# Patient Record
Sex: MALE | Race: ASIAN | NOT HISPANIC OR LATINO
[De-identification: names, ages, dates, MRNs, and addresses within clinical notes are randomized per-mention and may not be internally consistent; named-entity substitution may affect disease eponyms.]

---

## 2020-11-02 ENCOUNTER — APPOINTMENT (OUTPATIENT)
Dept: UROLOGY | Facility: CLINIC | Age: 80
End: 2020-11-02
Payer: MEDICARE

## 2020-11-02 VITALS — HEART RATE: 79 BPM | DIASTOLIC BLOOD PRESSURE: 76 MMHG | SYSTOLIC BLOOD PRESSURE: 128 MMHG

## 2020-11-02 VITALS — TEMPERATURE: 97 F

## 2020-11-02 DIAGNOSIS — F17.210 NICOTINE DEPENDENCE, CIGARETTES, UNCOMPLICATED: ICD-10-CM

## 2020-11-02 DIAGNOSIS — Z00.00 ENCOUNTER FOR GENERAL ADULT MEDICAL EXAMINATION W/OUT ABNORMAL FINDINGS: ICD-10-CM

## 2020-11-02 DIAGNOSIS — R35.1 NOCTURIA: ICD-10-CM

## 2020-11-02 PROCEDURE — 99204 OFFICE O/P NEW MOD 45 MIN: CPT

## 2020-11-02 PROCEDURE — 99072 ADDL SUPL MATRL&STAF TM PHE: CPT

## 2020-11-02 NOTE — ADDENDUM
[FreeTextEntry1] : Entered by Kristian Arndt, acting as scribe for Dr. Ted Husain.\par \par The documentation recorded by the scribe accurately reflects the service I personally performed and the decisions made by me.\par

## 2020-11-02 NOTE — LETTER BODY
[FreeTextEntry1] : Germaine HNathalia Soliz, DO\par [address]\[place] [address]\par [phone]\par \par Dear Dr. Soliz,\par \par REASON FOR VISIT: BPH.\par \par This is a 80 year-old Cantonese-speaking retired gentleman with lower urinary tract symptoms and BPH. Patient is here today for evaluation. Patient reports he has strong uroflow, but persistent urinary frequency, and urgency despite medical therapy. He denies any hematuria or urinary incontinence. His symptoms are aggravated by hydration. He denies any alleviating factors. The patient also reports nocturia more than twice per night. He is currently taking Flomax without improvement. He denies any pain. All other review of systems are negative. He has no cancer in his family medical history. He has no previous surgical history. Past medical history, family history and social history were inquired and were noncontributory to current condition. Patient currently smokes. I encourage the patient to stop smoking and seek smoking cessation programs. I discuss with him to potential long term complications and health effects from smoking. I gave the patient additional information including the [facility] Refer-to-Quit program. I spent over 3 minutes counseling the patient regarding smoking cessation. The patient does not drink alcohol. Medications and allergies were reviewed. He has no known allergies to medication. \par \par On examination, the patient is a healthy-appearing gentleman in no acute distress. He is alert and oriented follows commands. He has normal mood and affect. He is normocephalic. Neck is supple. Oral no thrush. Respirations are unlabored. His abdomen is soft and nontender. Bladder is nonpalpable. No CVA tenderness. Neurologically he is grossly intact. No peripheral edema. Skin without gross abnormality. He has normal male external genitalia. Normal meatus. Bilateral testes are descended intrascrotally and normal to palpation. On rectal examination, there is normal sphincter tone. His prostate is markedly enlarged.\par \par PVR was 70  cc.\par \par ASSESSMENT: BPH.\par \par I counseled the patient on the various etiology of his symptoms. I discussed the natural history of BPH and the treatment options available. I discussed the options of conservative management with fluid in dietary restrictions, herbal therapy, medical therapy, and minimally invasive procedures.  Risk and benefits were discussed. I answered his questions. I recommended he increase Flomax to BID. I also recommended the patient begin a trial of Proscar. I discussed the potential side effects of the medication. I counseled the patient on its use and side effects. If the patient develops any side effects, the patient will discontinue the medication and contact me. He may also consider cystoscopy to evaluate the urinary outlet. He will obtain PSA and BMP today to establish baselines. Risks and alternatives were discussed. I answered the patient questions. The patient will follow-up as directed and will contact me with any questions or concerns. Thank you for the opportunity to participate in the care of Mr. RO. I will keep you updated on his progress.\par \par Plan: Increase Flomax to BID. Trial of Proscar. PSA. BMP.  Follow-up as directed.

## 2020-11-04 LAB
ANION GAP SERPL CALC-SCNC: 11 MMOL/L
APPEARANCE: CLEAR
BACTERIA: NEGATIVE
BILIRUBIN URINE: NEGATIVE
BLOOD URINE: NEGATIVE
BUN SERPL-MCNC: 17 MG/DL
CALCIUM SERPL-MCNC: 9.7 MG/DL
CHLORIDE SERPL-SCNC: 104 MMOL/L
CO2 SERPL-SCNC: 25 MMOL/L
COLOR: NORMAL
CREAT SERPL-MCNC: 0.88 MG/DL
GLUCOSE QUALITATIVE U: NEGATIVE
GLUCOSE SERPL-MCNC: 92 MG/DL
HYALINE CASTS: 0 /LPF
KETONES URINE: NEGATIVE
LEUKOCYTE ESTERASE URINE: NEGATIVE
MICROSCOPIC-UA: NORMAL
NITRITE URINE: NEGATIVE
PH URINE: 6
POTASSIUM SERPL-SCNC: 4.7 MMOL/L
PROTEIN URINE: NEGATIVE
PSA FREE FLD-MCNC: 28 %
PSA FREE SERPL-MCNC: 1.72 NG/ML
PSA SERPL-MCNC: 6.08 NG/ML
RED BLOOD CELLS URINE: 1 /HPF
SODIUM SERPL-SCNC: 140 MMOL/L
SPECIFIC GRAVITY URINE: 1.02
SQUAMOUS EPITHELIAL CELLS: 0 /HPF
UROBILINOGEN URINE: NORMAL
WHITE BLOOD CELLS URINE: 0 /HPF

## 2021-09-20 ENCOUNTER — EMERGENCY (EMERGENCY)
Facility: HOSPITAL | Age: 81
LOS: 0 days | Discharge: HOME | End: 2021-09-20
Attending: HOSPITALIST
Payer: MEDICARE

## 2021-09-20 VITALS
HEIGHT: 63 IN | SYSTOLIC BLOOD PRESSURE: 102 MMHG | DIASTOLIC BLOOD PRESSURE: 51 MMHG | TEMPERATURE: 98 F | RESPIRATION RATE: 18 BRPM | WEIGHT: 130.07 LBS | HEART RATE: 119 BPM | OXYGEN SATURATION: 100 %

## 2021-09-20 VITALS — SYSTOLIC BLOOD PRESSURE: 99 MMHG | DIASTOLIC BLOOD PRESSURE: 45 MMHG

## 2021-09-20 DIAGNOSIS — R42 DIZZINESS AND GIDDINESS: ICD-10-CM

## 2021-09-20 DIAGNOSIS — R03.1 NONSPECIFIC LOW BLOOD-PRESSURE READING: ICD-10-CM

## 2021-09-20 LAB
ALBUMIN SERPL ELPH-MCNC: 3.8 G/DL — SIGNIFICANT CHANGE UP (ref 3.3–5)
ALP SERPL-CCNC: 90 U/L — SIGNIFICANT CHANGE UP (ref 40–120)
ALT FLD-CCNC: 22 U/L — SIGNIFICANT CHANGE UP (ref 12–78)
ANION GAP SERPL CALC-SCNC: 6 MMOL/L — SIGNIFICANT CHANGE UP (ref 5–17)
AST SERPL-CCNC: 11 U/L — LOW (ref 15–37)
BASOPHILS # BLD AUTO: 0.12 K/UL — SIGNIFICANT CHANGE UP (ref 0–0.2)
BASOPHILS NFR BLD AUTO: 1.2 % — SIGNIFICANT CHANGE UP (ref 0–2)
BILIRUB SERPL-MCNC: 0.4 MG/DL — SIGNIFICANT CHANGE UP (ref 0.2–1.2)
BUN SERPL-MCNC: 16 MG/DL — SIGNIFICANT CHANGE UP (ref 7–23)
CALCIUM SERPL-MCNC: 9.1 MG/DL — SIGNIFICANT CHANGE UP (ref 8.5–10.1)
CHLORIDE SERPL-SCNC: 104 MMOL/L — SIGNIFICANT CHANGE UP (ref 96–108)
CO2 SERPL-SCNC: 27 MMOL/L — SIGNIFICANT CHANGE UP (ref 22–31)
CREAT SERPL-MCNC: 1.07 MG/DL — SIGNIFICANT CHANGE UP (ref 0.5–1.3)
EOSINOPHIL # BLD AUTO: 0.68 K/UL — HIGH (ref 0–0.5)
EOSINOPHIL NFR BLD AUTO: 6.8 % — HIGH (ref 0–6)
GLUCOSE SERPL-MCNC: 113 MG/DL — HIGH (ref 70–99)
HCT VFR BLD CALC: 39.8 % — SIGNIFICANT CHANGE UP (ref 39–50)
HGB BLD-MCNC: 13.1 G/DL — SIGNIFICANT CHANGE UP (ref 13–17)
IMM GRANULOCYTES NFR BLD AUTO: 0.6 % — SIGNIFICANT CHANGE UP (ref 0–1.5)
LYMPHOCYTES # BLD AUTO: 2.15 K/UL — SIGNIFICANT CHANGE UP (ref 1–3.3)
LYMPHOCYTES # BLD AUTO: 21.4 % — SIGNIFICANT CHANGE UP (ref 13–44)
MCHC RBC-ENTMCNC: 30.2 PG — SIGNIFICANT CHANGE UP (ref 27–34)
MCHC RBC-ENTMCNC: 32.9 GM/DL — SIGNIFICANT CHANGE UP (ref 32–36)
MCV RBC AUTO: 91.7 FL — SIGNIFICANT CHANGE UP (ref 80–100)
MONOCYTES # BLD AUTO: 0.85 K/UL — SIGNIFICANT CHANGE UP (ref 0–0.9)
MONOCYTES NFR BLD AUTO: 8.4 % — SIGNIFICANT CHANGE UP (ref 2–14)
NEUTROPHILS # BLD AUTO: 6.21 K/UL — SIGNIFICANT CHANGE UP (ref 1.8–7.4)
NEUTROPHILS NFR BLD AUTO: 61.6 % — SIGNIFICANT CHANGE UP (ref 43–77)
PLATELET # BLD AUTO: 216 K/UL — SIGNIFICANT CHANGE UP (ref 150–400)
POTASSIUM SERPL-MCNC: 4 MMOL/L — SIGNIFICANT CHANGE UP (ref 3.5–5.3)
POTASSIUM SERPL-SCNC: 4 MMOL/L — SIGNIFICANT CHANGE UP (ref 3.5–5.3)
PROT SERPL-MCNC: 7.6 GM/DL — SIGNIFICANT CHANGE UP (ref 6–8.3)
RBC # BLD: 4.34 M/UL — SIGNIFICANT CHANGE UP (ref 4.2–5.8)
RBC # FLD: 13.1 % — SIGNIFICANT CHANGE UP (ref 10.3–14.5)
SODIUM SERPL-SCNC: 137 MMOL/L — SIGNIFICANT CHANGE UP (ref 135–145)
TROPONIN I SERPL-MCNC: <0.015 NG/ML — SIGNIFICANT CHANGE UP (ref 0.01–0.04)
WBC # BLD: 10.07 K/UL — SIGNIFICANT CHANGE UP (ref 3.8–10.5)
WBC # FLD AUTO: 10.07 K/UL — SIGNIFICANT CHANGE UP (ref 3.8–10.5)

## 2021-09-20 PROCEDURE — 93005 ELECTROCARDIOGRAM TRACING: CPT

## 2021-09-20 PROCEDURE — 85025 COMPLETE CBC W/AUTO DIFF WBC: CPT

## 2021-09-20 PROCEDURE — 36415 COLL VENOUS BLD VENIPUNCTURE: CPT

## 2021-09-20 PROCEDURE — 71045 X-RAY EXAM CHEST 1 VIEW: CPT | Mod: 26

## 2021-09-20 PROCEDURE — 99285 EMERGENCY DEPT VISIT HI MDM: CPT

## 2021-09-20 PROCEDURE — 80053 COMPREHEN METABOLIC PANEL: CPT

## 2021-09-20 PROCEDURE — 93010 ELECTROCARDIOGRAM REPORT: CPT

## 2021-09-20 PROCEDURE — 71045 X-RAY EXAM CHEST 1 VIEW: CPT

## 2021-09-20 PROCEDURE — 99284 EMERGENCY DEPT VISIT MOD MDM: CPT | Mod: 25

## 2021-09-20 PROCEDURE — 84484 ASSAY OF TROPONIN QUANT: CPT

## 2021-09-20 RX ORDER — SODIUM CHLORIDE 9 MG/ML
1000 INJECTION INTRAMUSCULAR; INTRAVENOUS; SUBCUTANEOUS ONCE
Refills: 0 | Status: COMPLETED | OUTPATIENT
Start: 2021-09-20 | End: 2021-09-20

## 2021-09-20 RX ADMIN — SODIUM CHLORIDE 1000 MILLILITER(S): 9 INJECTION INTRAMUSCULAR; INTRAVENOUS; SUBCUTANEOUS at 04:41

## 2021-09-20 NOTE — ED PROVIDER NOTE - CLINICAL SUMMARY MEDICAL DECISION MAKING FREE TEXT BOX
81M with episodic hypotension and associated dizziness. feeling better. labs, fluids, ekg, orthostatic vs.

## 2021-09-20 NOTE — ED ADULT NURSE NOTE - OBJECTIVE STATEMENT
Per patient, he was sleeping and woke up and urgently had to urinate. Got up quickly and walked fast to bathroom, patient states he became very dizzy, no LOC denies falling, patient sat down and symptoms resolved. Per EMS, on their arrival patient was hypotensive 50/20 and that states they argued with patient x1 hr about bringing him to ED. At triage, patient in do distress, denies any complaints, upset with EMS for bringing him to ED, tachycardia noted but all other VS stable.

## 2021-09-20 NOTE — ED PROVIDER NOTE - OBJECTIVE STATEMENT
81M p/w episode of low BP and dizziness pta. patient woke up at night and quickly got up to use the bathroom, felt dizzy and sat down. family called EMS. BP noted to be low upon arrival but then improved. patient felt much better. no loc or syncope. no cp. otherwise has been feeling well.

## 2021-09-20 NOTE — ED PROVIDER NOTE - NSFOLLOWUPINSTRUCTIONS_ED_ALL_ED_FT
please follow up with your doctor and stay well hydrated with water  return for any recurrent or worsening symptoms.

## 2021-09-20 NOTE — ED PROVIDER NOTE - PROGRESS NOTE DETAILS
Catarina APPLE: spoke with patient at length, offered admission but patient feeling well and prefers to follow up with his PMD this week. will return for any recurrence of sx.

## 2021-09-20 NOTE — ED PROVIDER NOTE - PATIENT PORTAL LINK FT
You can access the FollowMyHealth Patient Portal offered by [facility] by registering at the following website: [email or web address]. By joining [facility]’s FollowMyHealth portal, you will also be able to view your health information using other applications (apps) compatible with our system.

## 2021-11-23 ENCOUNTER — RX RENEWAL (OUTPATIENT)
Age: 81
End: 2021-11-23

## 2021-11-23 RX ORDER — FINASTERIDE 5 MG/1
5 TABLET, FILM COATED ORAL DAILY
Qty: 30 | Refills: 0 | Status: ACTIVE | COMMUNITY
Start: 2020-11-02 | End: 1900-01-01

## 2022-02-09 ENCOUNTER — RX RENEWAL (OUTPATIENT)
Age: 82
End: 2022-02-09

## 2022-05-11 ENCOUNTER — RX RENEWAL (OUTPATIENT)
Age: 82
End: 2022-05-11

## 2022-10-07 ENCOUNTER — APPOINTMENT (OUTPATIENT)
Dept: UROLOGY | Facility: CLINIC | Age: 82
End: 2022-10-07

## 2022-10-07 VITALS
WEIGHT: 95 LBS | BODY MASS INDEX: 18.65 KG/M2 | DIASTOLIC BLOOD PRESSURE: 55 MMHG | SYSTOLIC BLOOD PRESSURE: 115 MMHG | HEIGHT: 60 IN | OXYGEN SATURATION: 95 %

## 2022-10-07 DIAGNOSIS — N13.8 BENIGN PROSTATIC HYPERPLASIA WITH LOWER URINARY TRACT SYMPMS: ICD-10-CM

## 2022-10-07 DIAGNOSIS — N40.1 BENIGN PROSTATIC HYPERPLASIA WITH LOWER URINARY TRACT SYMPMS: ICD-10-CM

## 2022-10-07 PROCEDURE — 99214 OFFICE O/P EST MOD 30 MIN: CPT | Mod: 25

## 2022-10-07 PROCEDURE — 51798 US URINE CAPACITY MEASURE: CPT

## 2022-10-07 NOTE — ASSESSMENT
[FreeTextEntry1] : Reviewed records, discussed labs. \par \par Benign Prostatic Hyperplasia:\par Discussed treatment options, will continue Flomax and Finasteride. \par \par Urinary frequency:\par Will get Urinalysis, Urine culture and Urine cytology.\par Gave samples of Myrbetriq. \par Explained common side effects: HTN, urinary retention, nasopharyngitis, headache, tachycardia. \par Asked to update us in a few weeks. \par If continues to have bother will consider Cystoscopy. \par \par Abnormal weight loss:\par Will get PSA. \par Recommended to follow up with PCP for lymphadenopathy.\par Consider Needle biopsy. \par \par Return to office in 2 months or sooner if any issues: will do Uroflo/PVR. \par \par \par \par

## 2022-10-07 NOTE — LETTER BODY
[Dear  ___] : Dear  [unfilled], [Consult Letter:] : I had the pleasure of evaluating your patient, [unfilled]. [( Thank you for referring [unfilled] for consultation for _____ )] : Thank you for referring [unfilled] for consultation for [unfilled] [Please see my note below.] : Please see my note below. [Consult Closing:] : Thank you very much for allowing me to participate in the care of this patient.  If you have any questions, please do not hesitate to contact me. [Sincerely,] : Sincerely, [FreeTextEntry3] : Flaco Baptiste MD\par  of Urology\par [facility]\par \par [facility]\par Offices:\par [address] [place]\par [address] [address]\par [address] [facility], [address]\par \par TEL: [phone]\par FAX: [phone]

## 2022-10-07 NOTE — PHYSICAL EXAM
[Normal Appearance] : normal appearance [General Appearance - In No Acute Distress] : no acute distress [] : no respiratory distress [Abdomen Soft] : soft [Abdomen Tenderness] : non-tender [Urethral Meatus] : meatus normal [Penis Abnormality] : normal uncircumcised penis [Scrotum] : the scrotum was normal [Epididymis] : the epididymides were normal [Testes Tenderness] : no tenderness of the testes [Testes Mass (___cm)] : there were no testicular masses [Prostate Tenderness] : the prostate was not tender [No Prostate Nodules] : no prostate nodules [Prostate Size ___ (0-4)] : prostate size [unfilled] (scale: 0-4) [Normal Station and Gait] : the gait and station were normal for the patient's age [Skin Color & Pigmentation] : normal skin color and pigmentation [No Focal Deficits] : no focal deficits [Oriented To Time, Place, And Person] : oriented to person, place, and time [FreeTextEntry1] : enlarged and firm bilateral inguinal, axillary and cervical lymph nodes

## 2022-10-07 NOTE — HISTORY OF PRESENT ILLNESS
[FreeTextEntry1] : Patient primarily Cantonese speaking, with limited English. \par Visit conducted with help of accompanying who was translating. \par Takes Flomax and Finasteride. Had stopped and was restarted 1 week ago. No difference in urination. \par Reports reasonable stream, urinates every hour or so during the day. Nocturia of 2 x. \par Denies hesitancy, straining, intermittency, urgency, incontinence, sense of incomplete emptying.\par Denies dysuria, hematuria, lower abdominal or flank pain, fever, chills or rigors.\par No family history of Prostate cancer. \par Has had 30 lbs weight loss in last 6 months. \par Smoker. 0.5 PPD for 50 years. \par \par Saw Dr Husain in the past. \par

## 2022-10-08 ENCOUNTER — APPOINTMENT (OUTPATIENT)
Dept: ULTRASOUND IMAGING | Facility: CLINIC | Age: 82
End: 2022-10-08

## 2022-10-08 ENCOUNTER — APPOINTMENT (OUTPATIENT)
Dept: CT IMAGING | Facility: CLINIC | Age: 82
End: 2022-10-08

## 2022-10-08 ENCOUNTER — OUTPATIENT (OUTPATIENT)
Dept: OUTPATIENT SERVICES | Facility: HOSPITAL | Age: 82
LOS: 1 days | End: 2022-10-08
Payer: MEDICARE

## 2022-10-08 DIAGNOSIS — R93.1 ABNORMAL FINDINGS ON DIAGNOSTIC IMAGING OF HEART AND CORONARY CIRCULATION: ICD-10-CM

## 2022-10-08 PROCEDURE — 76775 US EXAM ABDO BACK WALL LIM: CPT | Mod: 26

## 2022-10-08 PROCEDURE — 71250 CT THORAX DX C-: CPT | Mod: 26

## 2022-10-08 PROCEDURE — 76775 US EXAM ABDO BACK WALL LIM: CPT

## 2022-10-08 PROCEDURE — 71250 CT THORAX DX C-: CPT

## 2022-10-10 LAB
BACTERIA UR CULT: NORMAL
PSA FREE FLD-MCNC: 44 %
PSA FREE SERPL-MCNC: 1.42 NG/ML
PSA SERPL-MCNC: 3.24 NG/ML

## 2022-10-11 LAB
APPEARANCE: ABNORMAL
BACTERIA: NEGATIVE
BILIRUBIN URINE: NEGATIVE
BLOOD URINE: ABNORMAL
CALCIUM OXALATE CRYSTALS: ABNORMAL
COLOR: YELLOW
GLUCOSE QUALITATIVE U: NEGATIVE
HYALINE CASTS: 4 /LPF
KETONES URINE: NEGATIVE
LEUKOCYTE ESTERASE URINE: ABNORMAL
MICROSCOPIC-UA: NORMAL
NITRITE URINE: NEGATIVE
PH URINE: 6
PROTEIN URINE: ABNORMAL
RED BLOOD CELLS URINE: 3 /HPF
SPECIFIC GRAVITY URINE: 1.03
SQUAMOUS EPITHELIAL CELLS: 4 /HPF
URIC ACID CRYSTALS: ABNORMAL
UROBILINOGEN URINE: NORMAL
WHITE BLOOD CELLS URINE: 9 /HPF

## 2022-10-13 LAB — URINE CYTOLOGY: NORMAL

## 2022-10-14 ENCOUNTER — OUTPATIENT (OUTPATIENT)
Dept: OUTPATIENT SERVICES | Facility: HOSPITAL | Age: 82
LOS: 1 days | Discharge: HOME | End: 2022-10-14

## 2022-10-14 DIAGNOSIS — D64.9 ANEMIA, UNSPECIFIED: ICD-10-CM

## 2022-10-15 ENCOUNTER — OUTPATIENT (OUTPATIENT)
Dept: OUTPATIENT SERVICES | Facility: HOSPITAL | Age: 82
LOS: 1 days | End: 2022-10-15
Payer: MEDICARE

## 2022-10-15 ENCOUNTER — APPOINTMENT (OUTPATIENT)
Dept: CT IMAGING | Facility: CLINIC | Age: 82
End: 2022-10-15

## 2022-10-15 DIAGNOSIS — R16.1 SPLENOMEGALY, NOT ELSEWHERE CLASSIFIED: ICD-10-CM

## 2022-10-15 PROCEDURE — 74176 CT ABD & PELVIS W/O CONTRAST: CPT | Mod: 26

## 2022-10-15 PROCEDURE — 74176 CT ABD & PELVIS W/O CONTRAST: CPT

## 2022-10-20 ENCOUNTER — LABORATORY RESULT (OUTPATIENT)
Age: 82
End: 2022-10-20

## 2022-10-20 ENCOUNTER — RESULT REVIEW (OUTPATIENT)
Age: 82
End: 2022-10-20

## 2022-10-20 ENCOUNTER — APPOINTMENT (OUTPATIENT)
Dept: HEMATOLOGY ONCOLOGY | Facility: CLINIC | Age: 82
End: 2022-10-20

## 2022-10-20 LAB
ANISOCYTOSIS BLD QL: SLIGHT — SIGNIFICANT CHANGE UP
BASOPHILS # BLD AUTO: 0.3 K/UL — HIGH (ref 0–0.2)
BASOPHILS NFR BLD AUTO: 2 % — SIGNIFICANT CHANGE UP (ref 0–2)
EOSINOPHIL # BLD AUTO: 0.45 K/UL — SIGNIFICANT CHANGE UP (ref 0–0.5)
EOSINOPHIL NFR BLD AUTO: 3 % — SIGNIFICANT CHANGE UP (ref 0–6)
HCT VFR BLD CALC: 35.3 % — LOW (ref 39–50)
HGB BLD-MCNC: 11.3 G/DL — LOW (ref 13–17)
LG PLATELETS BLD QL AUTO: SLIGHT — SIGNIFICANT CHANGE UP
LYMPHOCYTES # BLD AUTO: 42 % — SIGNIFICANT CHANGE UP (ref 13–44)
LYMPHOCYTES # BLD AUTO: 6.32 K/UL — HIGH (ref 1–3.3)
MACROCYTES BLD QL: SLIGHT — SIGNIFICANT CHANGE UP
MCHC RBC-ENTMCNC: 29.1 PG — SIGNIFICANT CHANGE UP (ref 27–34)
MCHC RBC-ENTMCNC: 32 GM/DL — SIGNIFICANT CHANGE UP (ref 32–36)
MCV RBC AUTO: 91 FL — SIGNIFICANT CHANGE UP (ref 80–100)
METAMYELOCYTES # FLD: 1 % — HIGH (ref 0–0)
MICROCYTES BLD QL: SLIGHT — SIGNIFICANT CHANGE UP
MONOCYTES # BLD AUTO: 1.05 K/UL — HIGH (ref 0–0.9)
MONOCYTES NFR BLD AUTO: 7 % — SIGNIFICANT CHANGE UP (ref 2–14)
MYELOCYTES NFR BLD: 1 % — HIGH (ref 0–0)
NEUTROPHILS # BLD AUTO: 5.87 K/UL — SIGNIFICANT CHANGE UP (ref 1.8–7.4)
NEUTROPHILS NFR BLD AUTO: 33 % — LOW (ref 43–77)
NEUTS BAND # BLD: 6 % — SIGNIFICANT CHANGE UP (ref 0–8)
NRBC # BLD: 0 /100 — SIGNIFICANT CHANGE UP (ref 0–0)
NRBC # BLD: SIGNIFICANT CHANGE UP /100 WBCS (ref 0–0)
OVALOCYTES BLD QL SMEAR: SLIGHT — SIGNIFICANT CHANGE UP
PLAT MORPH BLD: NORMAL — SIGNIFICANT CHANGE UP
PLATELET # BLD AUTO: 172 K/UL — SIGNIFICANT CHANGE UP (ref 150–400)
POIKILOCYTOSIS BLD QL AUTO: SLIGHT — SIGNIFICANT CHANGE UP
RBC # BLD: 3.88 M/UL — LOW (ref 4.2–5.8)
RBC # FLD: 14.6 % — HIGH (ref 10.3–14.5)
RBC BLD AUTO: SIGNIFICANT CHANGE UP
VARIANT LYMPHS # BLD: 5 % — SIGNIFICANT CHANGE UP (ref 0–6)
WBC # BLD: 15.05 K/UL — HIGH (ref 3.8–10.5)
WBC # FLD AUTO: 15.05 K/UL — HIGH (ref 3.8–10.5)

## 2022-10-21 ENCOUNTER — OUTPATIENT (OUTPATIENT)
Dept: OUTPATIENT SERVICES | Facility: HOSPITAL | Age: 82
LOS: 1 days | End: 2022-10-21
Payer: MEDICARE

## 2022-10-21 ENCOUNTER — APPOINTMENT (OUTPATIENT)
Dept: NUCLEAR MEDICINE | Facility: CLINIC | Age: 82
End: 2022-10-21

## 2022-10-21 DIAGNOSIS — Z00.8 ENCOUNTER FOR OTHER GENERAL EXAMINATION: ICD-10-CM

## 2022-10-21 LAB
B2 GLYCOPROT1 AB SER QL: NEGATIVE — SIGNIFICANT CHANGE UP
IGA FLD-MCNC: 179 MG/DL — SIGNIFICANT CHANGE UP (ref 84–499)
IGG FLD-MCNC: 1737 MG/DL — HIGH (ref 610–1660)
IGM SERPL-MCNC: 39 MG/DL — SIGNIFICANT CHANGE UP (ref 35–242)
KAPPA LC SER QL IFE: 6.83 MG/DL — HIGH (ref 0.33–1.94)
KAPPA/LAMBDA FREE LIGHT CHAIN RATIO, SERUM: 2.88 RATIO — HIGH (ref 0.26–1.65)
LAMBDA LC SER QL IFE: 2.37 MG/DL — SIGNIFICANT CHANGE UP (ref 0.57–2.63)
LDH SERPL L TO P-CCNC: 299 U/L — HIGH (ref 50–242)

## 2022-10-21 PROCEDURE — 78815 PET IMAGE W/CT SKULL-THIGH: CPT | Mod: 26,PI

## 2022-10-21 PROCEDURE — A9552: CPT

## 2022-10-21 PROCEDURE — 78815 PET IMAGE W/CT SKULL-THIGH: CPT

## 2022-10-26 PROBLEM — F17.200 CURRENT EVERY DAY SMOKER: Status: ACTIVE | Noted: 2022-10-26

## 2022-10-26 LAB
% ALBUMIN: 51.3 % — SIGNIFICANT CHANGE UP
% ALPHA 1: 4.3 % — SIGNIFICANT CHANGE UP
% ALPHA 2: 10.8 % — SIGNIFICANT CHANGE UP
% BETA: 10.8 % — SIGNIFICANT CHANGE UP
% GAMMA: 22.8 % — SIGNIFICANT CHANGE UP
ALBUMIN SERPL ELPH-MCNC: 3.7 G/DL — SIGNIFICANT CHANGE UP (ref 3.6–5.5)
ALBUMIN/GLOB SERPL ELPH: 1 RATIO — SIGNIFICANT CHANGE UP
ALPHA1 GLOB SERPL ELPH-MCNC: 0.3 G/DL — SIGNIFICANT CHANGE UP (ref 0.1–0.4)
ALPHA2 GLOB SERPL ELPH-MCNC: 0.8 G/DL — SIGNIFICANT CHANGE UP (ref 0.5–1)
B-GLOBULIN SERPL ELPH-MCNC: 0.8 G/DL — SIGNIFICANT CHANGE UP (ref 0.5–1)
GAMMA GLOBULIN: 1.7 G/DL — HIGH (ref 0.6–1.6)
INTERPRETATION SERPL IFE-IMP: SIGNIFICANT CHANGE UP
PROT PATTERN SERPL ELPH-IMP: SIGNIFICANT CHANGE UP
PROT SERPL-MCNC: 7.3 G/DL — SIGNIFICANT CHANGE UP (ref 6–8.3)
PROT SERPL-MCNC: 7.3 G/DL — SIGNIFICANT CHANGE UP (ref 6–8.3)

## 2022-10-27 DIAGNOSIS — Z72.3 LACK OF PHYSICAL EXERCISE: ICD-10-CM

## 2022-10-27 RX ORDER — CHOLECALCIFEROL (VITAMIN D3) 25 MCG
TABLET ORAL
Refills: 0 | Status: ACTIVE | COMMUNITY

## 2022-10-27 RX ORDER — TAMSULOSIN HYDROCHLORIDE 0.4 MG/1
0.4 CAPSULE ORAL
Refills: 0 | Status: DISCONTINUED | COMMUNITY
End: 2022-10-27

## 2022-10-28 ENCOUNTER — NON-APPOINTMENT (OUTPATIENT)
Age: 82
End: 2022-10-28

## 2022-10-28 ENCOUNTER — APPOINTMENT (OUTPATIENT)
Dept: CARDIOLOGY | Facility: CLINIC | Age: 82
End: 2022-10-28

## 2022-10-28 VITALS
WEIGHT: 103 LBS | OXYGEN SATURATION: 99 % | BODY MASS INDEX: 20.22 KG/M2 | HEART RATE: 89 BPM | HEIGHT: 60 IN | DIASTOLIC BLOOD PRESSURE: 68 MMHG | SYSTOLIC BLOOD PRESSURE: 124 MMHG

## 2022-10-28 PROCEDURE — 99204 OFFICE O/P NEW MOD 45 MIN: CPT | Mod: 25

## 2022-10-28 PROCEDURE — 93000 ELECTROCARDIOGRAM COMPLETE: CPT

## 2022-10-28 NOTE — DISCUSSION/SUMMARY
[Deteriorating] : deteriorating [FreeTextEntry1] : Pt will have an Echo to evaluate for valvulopathy. Pt will return in one month.  [EKG obtained to assist in diagnosis and management of assessed problem(s)] : EKG obtained to assist in diagnosis and management of assessed problem(s)

## 2022-10-28 NOTE — HISTORY OF PRESENT ILLNESS
[FreeTextEntry1] : 83 yo male presents on the advice of his PMD for a murmur. Pt is being evaluated for a new diagnosis of lymphoma. He had a 20 lb weight loss. Pt denies history of CAD or CHF. No history of MI. No syncope or near syncope. No palpitations.

## 2022-10-28 NOTE — PHYSICAL EXAM
[Well Developed] : well developed [Well Nourished] : well nourished [No Acute Distress] : no acute distress [Normal Conjunctiva] : normal conjunctiva [Normal Venous Pressure] : normal venous pressure [No Carotid Bruit] : no carotid bruit [Normal S1, S2] : normal S1, S2 [No Rub] : no rub [No Gallop] : no gallop [Clear Lung Fields] : clear lung fields [Good Air Entry] : good air entry [No Respiratory Distress] : no respiratory distress  [Soft] : abdomen soft [Non Tender] : non-tender [No Masses/organomegaly] : no masses/organomegaly [Normal Bowel Sounds] : normal bowel sounds [Normal Gait] : normal gait [No Edema] : no edema [No Cyanosis] : no cyanosis [No Clubbing] : no clubbing [No Varicosities] : no varicosities [No Rash] : no rash [No Skin Lesions] : no skin lesions [Moves all extremities] : moves all extremities [No Focal Deficits] : no focal deficits [Normal Speech] : normal speech [Alert and Oriented] : alert and oriented [Normal memory] : normal memory [de-identified] : 2/6 qamar

## 2022-10-31 ENCOUNTER — RESULT REVIEW (OUTPATIENT)
Age: 82
End: 2022-10-31

## 2022-10-31 ENCOUNTER — APPOINTMENT (OUTPATIENT)
Dept: HEMATOLOGY ONCOLOGY | Facility: CLINIC | Age: 82
End: 2022-10-31
Payer: MEDICARE

## 2022-10-31 VITALS
SYSTOLIC BLOOD PRESSURE: 130 MMHG | OXYGEN SATURATION: 96 % | WEIGHT: 101.44 LBS | TEMPERATURE: 98.2 F | DIASTOLIC BLOOD PRESSURE: 70 MMHG | BODY MASS INDEX: 19.91 KG/M2 | HEART RATE: 95 BPM | HEIGHT: 60 IN | RESPIRATION RATE: 18 BRPM

## 2022-10-31 DIAGNOSIS — C85.10 UNSPECIFIED B-CELL LYMPHOMA, UNSPECIFIED SITE: ICD-10-CM

## 2022-10-31 DIAGNOSIS — F17.200 NICOTINE DEPENDENCE, UNSPECIFIED, UNCOMPLICATED: ICD-10-CM

## 2022-10-31 LAB
ANION GAP SERPL CALC-SCNC: 13 MMOL/L — SIGNIFICANT CHANGE UP (ref 5–17)
BUN SERPL-MCNC: 18 MG/DL — SIGNIFICANT CHANGE UP (ref 7–23)
CALCIUM SERPL-MCNC: 9.4 MG/DL — SIGNIFICANT CHANGE UP (ref 8.4–10.5)
CHLORIDE SERPL-SCNC: 105 MMOL/L — SIGNIFICANT CHANGE UP (ref 96–108)
CO2 SERPL-SCNC: 25 MMOL/L — SIGNIFICANT CHANGE UP (ref 22–31)
CREAT SERPL-MCNC: 0.84 MG/DL — SIGNIFICANT CHANGE UP (ref 0.5–1.3)
EGFR: 88 ML/MIN/1.73M2 — SIGNIFICANT CHANGE UP
GLUCOSE SERPL-MCNC: 98 MG/DL — SIGNIFICANT CHANGE UP (ref 70–99)
POTASSIUM SERPL-MCNC: 4.5 MMOL/L — SIGNIFICANT CHANGE UP (ref 3.5–5.3)
POTASSIUM SERPL-SCNC: 4.5 MMOL/L — SIGNIFICANT CHANGE UP (ref 3.5–5.3)
SODIUM SERPL-SCNC: 142 MMOL/L — SIGNIFICANT CHANGE UP (ref 135–145)
URATE SERPL-MCNC: 6.5 MG/DL — SIGNIFICANT CHANGE UP (ref 3.4–8.8)

## 2022-10-31 PROCEDURE — 99205 OFFICE O/P NEW HI 60 MIN: CPT

## 2022-10-31 NOTE — CONSULT LETTER
[Dear  ___] : Dear  [unfilled], [( Thank you for referring [unfilled] for consultation for _____ )] : Thank you for referring [unfilled] for consultation for [unfilled] [Please see my note below.] : Please see my note below. [Consult Closing:] : Thank you very much for allowing me to participate in the care of this patient.  If you have any questions, please do not hesitate to contact me. [Sincerely,] : Sincerely, [FreeTextEntry2] : Dr Nickie Kaufman [FreeTextEntry3] : Ciarra Chand

## 2022-10-31 NOTE — REASON FOR VISIT
[Initial Consultation] : an initial consultation for [Family Member] : family member [FreeTextEntry2] : lymphoma

## 2022-10-31 NOTE — REVIEW OF SYSTEMS
[Patient Intake Form Reviewed] : Patient intake form was reviewed [Fatigue] : fatigue [Recent Change In Weight] : ~T recent weight change [Shortness Of Breath] : shortness of breath [Cough] : cough [Swollen Glands] : swollen glands [Negative] : Allergic/Immunologic

## 2022-10-31 NOTE — ASSESSMENT
[FreeTextEntry1] : 81 y/o male with recent significant weight loss and diffuse avid adenopathy in neck, chest, abd and pelvis. 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 Might  need allopurinol.\par Might need Mediport if anthracycline based chemo - final treatment plan will be determined based on pathology from LN biopsy.\par \par return visit later next week to discuss biopsy results and treatment plan.\par Discussed with patient  and his son Bear

## 2022-10-31 NOTE — HISTORY OF PRESENT ILLNESS
[de-identified] : LOBO RO is an 82 y.o. M with a PMH significant for HTN, HLD, current  1/2 PPD smoker x 65 years, and BPH, who has been referred to our office for an evaluation of an newly diagnosed lymphoma.\par \par 10/11/22 - Patient saw provider at [facility] with c/o 20lb unintentional weight loss over past 2 months, no appetite, increased fatigue, and lymphadenopathy.  \par 10/8/22 - Has Abd US to evaluate of AAA - no evidence of AAA but showed extensive retroperitoneal adenopathy and splenomegaly.  Was sent for CT scans. \par 10/8/22 - CT Chest (?Lung cancer screening) -  Bulky multi station thoracoabdominal lymphadenopathy with splenomegaly, concerning for lymphoma.  Peripheral splenic hypodensity may represent an infarct or mass. Small right and moderate left pleural effusions with underlying pleural thickening on the left; consider cytologic correlation, as lymphomatous involvement is possible.\par \par 10/18/22 - CT A/P - Diffuse bulky para-aortic, periportal, and mesenteric lymphadenopathy. Bilateral bulky inguinal and iliac chain lymphadenopathy. For reference:\par * Pleural LN measures 5.4 x 4.5 cm.\par * Right inguinal LN measures 3.6 x 1.8 cm.\par * Confluent RPLAD surrounding the aorta measures 9.3 x 6.2 cm.\par \par 10/21/22 - PET/CT -  Large  and markedly avid conglomerate adenopathy in the neck, chest, abdomen, and pelvis. Large loculated  left pleural effusion and small R pleural effusion.   Hypermetabolic 1 cm cutaneous lesion in the right chest wall. Recommend clinical inspection as neoplasm could have this appearance.\par \par 10/20/22 - Labs with elevated WBC 15K, Peripheral blood flow cytometry: Monotypic B-cells (10%) positive for Kappa, CD19, CD20, CD10, negative for CD5, , CD23. \par \par C/o fatigue. Weight  loss as above . No pain. No fevers . No night sweats. Cough when lying down.  Noticed swollen glands- neck, axilale and groin x few weeks \par \par Here with son Bear.\par \par Lives with wife and son.

## 2022-10-31 NOTE — PHYSICAL EXAM
[Restricted in physically strenuous activity but ambulatory and able to carry out work of a light or sedentary nature] : Status 1- Restricted in physically strenuous activity but ambulatory and able to carry out work of a light or sedentary nature, e.g., light house work, office work [Normal] : affect appropriate [de-identified] : multiple b/l firm cervical LNS  [de-identified] : diminished L chest 1/2 way up R lung clear [de-identified] : regular with systolic murmur  [de-identified] : no pedal edema  [de-identified] : diffuse firm FERNANDO - neck, b/l axillary, b/l inguinal  [de-identified] : no overt skin lesion in area of chest wall uptake on PET

## 2022-11-01 ENCOUNTER — NON-APPOINTMENT (OUTPATIENT)
Age: 82
End: 2022-11-01

## 2022-11-01 LAB
HBV CORE AB SER-ACNC: REACTIVE
HBV SURFACE AB SER-ACNC: SIGNIFICANT CHANGE UP
HBV SURFACE AG SER-ACNC: SIGNIFICANT CHANGE UP
HCV AB S/CO SERPL IA: 0.19 S/CO — SIGNIFICANT CHANGE UP (ref 0–0.99)
HCV AB SERPL-IMP: SIGNIFICANT CHANGE UP

## 2022-11-03 ENCOUNTER — APPOINTMENT (OUTPATIENT)
Dept: CARDIOLOGY | Facility: CLINIC | Age: 82
End: 2022-11-03

## 2022-11-03 PROCEDURE — 93306 TTE W/DOPPLER COMPLETE: CPT

## 2022-11-04 ENCOUNTER — TRANSCRIPTION ENCOUNTER (OUTPATIENT)
Age: 82
End: 2022-11-04

## 2022-11-04 ENCOUNTER — RESULT REVIEW (OUTPATIENT)
Age: 82
End: 2022-11-04

## 2022-11-04 ENCOUNTER — OUTPATIENT (OUTPATIENT)
Dept: INPATIENT UNIT | Facility: HOSPITAL | Age: 82
LOS: 1 days | Discharge: HOME | End: 2022-11-04
Payer: MEDICARE

## 2022-11-04 VITALS
HEART RATE: 83 BPM | SYSTOLIC BLOOD PRESSURE: 126 MMHG | DIASTOLIC BLOOD PRESSURE: 74 MMHG | WEIGHT: 104.94 LBS | RESPIRATION RATE: 16 BRPM | OXYGEN SATURATION: 96 % | HEIGHT: 60 IN | TEMPERATURE: 98 F

## 2022-11-04 VITALS
TEMPERATURE: 97 F | DIASTOLIC BLOOD PRESSURE: 66 MMHG | HEART RATE: 99 BPM | SYSTOLIC BLOOD PRESSURE: 139 MMHG | OXYGEN SATURATION: 95 % | RESPIRATION RATE: 15 BRPM

## 2022-11-04 DIAGNOSIS — R59.0 LOCALIZED ENLARGED LYMPH NODES: ICD-10-CM

## 2022-11-04 DIAGNOSIS — J90 PLEURAL EFFUSION, NOT ELSEWHERE CLASSIFIED: ICD-10-CM

## 2022-11-04 DIAGNOSIS — Z98.890 OTHER SPECIFIED POSTPROCEDURAL STATES: Chronic | ICD-10-CM

## 2022-11-04 DIAGNOSIS — R59.1 GENERALIZED ENLARGED LYMPH NODES: ICD-10-CM

## 2022-11-04 LAB
ALBUMIN FLD-MCNC: 2.4 G/DL — SIGNIFICANT CHANGE UP
ANION GAP SERPL CALC-SCNC: 7 MMOL/L — SIGNIFICANT CHANGE UP (ref 5–17)
B PERT IGG+IGM PNL SER: ABNORMAL
BUN SERPL-MCNC: 18 MG/DL — SIGNIFICANT CHANGE UP (ref 7–23)
CALCIUM SERPL-MCNC: 9 MG/DL — SIGNIFICANT CHANGE UP (ref 8.5–10.1)
CHLORIDE SERPL-SCNC: 108 MMOL/L — SIGNIFICANT CHANGE UP (ref 96–108)
CO2 SERPL-SCNC: 25 MMOL/L — SIGNIFICANT CHANGE UP (ref 22–31)
COLOR FLD: SIGNIFICANT CHANGE UP
CREAT SERPL-MCNC: 0.8 MG/DL — SIGNIFICANT CHANGE UP (ref 0.5–1.3)
EGFR: 89 ML/MIN/1.73M2 — SIGNIFICANT CHANGE UP
EOSINOPHIL # FLD: 2 % — SIGNIFICANT CHANGE UP
FLUID INTAKE SUBSTANCE CLASS: SIGNIFICANT CHANGE UP
GLUCOSE FLD-MCNC: 103 MG/DL — SIGNIFICANT CHANGE UP
GLUCOSE SERPL-MCNC: 107 MG/DL — HIGH (ref 70–99)
GRAM STN FLD: SIGNIFICANT CHANGE UP
HCT VFR BLD CALC: 33.6 % — LOW (ref 39–50)
HGB BLD-MCNC: 10.8 G/DL — LOW (ref 13–17)
INR BLD: 1.07 RATIO — SIGNIFICANT CHANGE UP (ref 0.88–1.16)
LDH SERPL L TO P-CCNC: 162 U/L — SIGNIFICANT CHANGE UP
LYMPHOCYTES # FLD: 82 % — SIGNIFICANT CHANGE UP
MCHC RBC-ENTMCNC: 29.7 PG — SIGNIFICANT CHANGE UP (ref 27–34)
MCHC RBC-ENTMCNC: 32.1 GM/DL — SIGNIFICANT CHANGE UP (ref 32–36)
MCV RBC AUTO: 92.3 FL — SIGNIFICANT CHANGE UP (ref 80–100)
MONOS+MACROS # FLD: 10 % — SIGNIFICANT CHANGE UP
NEUTROPHILS-BODY FLUID: 6 % — SIGNIFICANT CHANGE UP
PH FLD: 7.8 — SIGNIFICANT CHANGE UP
PLATELET # BLD AUTO: 145 K/UL — LOW (ref 150–400)
POTASSIUM SERPL-MCNC: 3.9 MMOL/L — SIGNIFICANT CHANGE UP (ref 3.5–5.3)
POTASSIUM SERPL-SCNC: 3.9 MMOL/L — SIGNIFICANT CHANGE UP (ref 3.5–5.3)
PROT FLD-MCNC: 4.9 G/DL — SIGNIFICANT CHANGE UP
PROTHROM AB SERPL-ACNC: 12.4 SEC — SIGNIFICANT CHANGE UP (ref 10.5–13.4)
RBC # BLD: 3.64 M/UL — LOW (ref 4.2–5.8)
RBC # FLD: 14.6 % — HIGH (ref 10.3–14.5)
RCV VOL RI: HIGH /UL (ref 0–0)
SODIUM SERPL-SCNC: 140 MMOL/L — SIGNIFICANT CHANGE UP (ref 135–145)
SPECIMEN SOURCE: SIGNIFICANT CHANGE UP
TOTAL NUCLEATED CELL COUNT, BODY FLUID: 3117 /UL — SIGNIFICANT CHANGE UP
TUBE TYPE: SIGNIFICANT CHANGE UP
WBC # BLD: 14.51 K/UL — HIGH (ref 3.8–10.5)
WBC # FLD AUTO: 14.51 K/UL — HIGH (ref 3.8–10.5)

## 2022-11-04 PROCEDURE — 88305 TISSUE EXAM BY PATHOLOGIST: CPT | Mod: 26

## 2022-11-04 PROCEDURE — 71045 X-RAY EXAM CHEST 1 VIEW: CPT

## 2022-11-04 PROCEDURE — 36415 COLL VENOUS BLD VENIPUNCTURE: CPT

## 2022-11-04 PROCEDURE — 84157 ASSAY OF PROTEIN OTHER: CPT

## 2022-11-04 PROCEDURE — 88108 CYTOPATH CONCENTRATE TECH: CPT

## 2022-11-04 PROCEDURE — 88307 TISSUE EXAM BY PATHOLOGIST: CPT | Mod: 26

## 2022-11-04 PROCEDURE — 32555 ASPIRATE PLEURA W/ IMAGING: CPT | Mod: LT

## 2022-11-04 PROCEDURE — 82042 OTHER SOURCE ALBUMIN QUAN EA: CPT

## 2022-11-04 PROCEDURE — 38505 NEEDLE BIOPSY LYMPH NODES: CPT

## 2022-11-04 PROCEDURE — 71045 X-RAY EXAM CHEST 1 VIEW: CPT | Mod: 26

## 2022-11-04 PROCEDURE — 83615 LACTATE (LD) (LDH) ENZYME: CPT

## 2022-11-04 PROCEDURE — 87102 FUNGUS ISOLATION CULTURE: CPT

## 2022-11-04 PROCEDURE — 88307 TISSUE EXAM BY PATHOLOGIST: CPT

## 2022-11-04 PROCEDURE — 85027 COMPLETE CBC AUTOMATED: CPT

## 2022-11-04 PROCEDURE — 83986 ASSAY PH BODY FLUID NOS: CPT

## 2022-11-04 PROCEDURE — 88172 CYTP DX EVAL FNA 1ST EA SITE: CPT

## 2022-11-04 PROCEDURE — 80048 BASIC METABOLIC PNL TOTAL CA: CPT

## 2022-11-04 PROCEDURE — 76942 ECHO GUIDE FOR BIOPSY: CPT | Mod: 26,59

## 2022-11-04 PROCEDURE — 89051 BODY FLUID CELL COUNT: CPT

## 2022-11-04 PROCEDURE — 88305 TISSUE EXAM BY PATHOLOGIST: CPT

## 2022-11-04 PROCEDURE — 87075 CULTR BACTERIA EXCEPT BLOOD: CPT

## 2022-11-04 PROCEDURE — 87070 CULTURE OTHR SPECIMN AEROBIC: CPT

## 2022-11-04 PROCEDURE — 88108 CYTOPATH CONCENTRATE TECH: CPT | Mod: 26

## 2022-11-04 PROCEDURE — 85610 PROTHROMBIN TIME: CPT

## 2022-11-04 PROCEDURE — 82945 GLUCOSE OTHER FLUID: CPT

## 2022-11-04 PROCEDURE — C1729: CPT

## 2022-11-04 PROCEDURE — 76942 ECHO GUIDE FOR BIOPSY: CPT | Mod: 59

## 2022-11-04 NOTE — ASU PATIENT PROFILE, ADULT - NSICDXPASTMEDICALHX_GEN_ALL_CORE_FT
PAST MEDICAL HISTORY:  Benign essential HTN     History of BPH     HLD (hyperlipidemia)     Lymphoma     Smoker

## 2022-11-04 NOTE — ASU DISCHARGE PLAN (ADULT/PEDIATRIC) - NS MD DC FALL RISK RISK
For information on Fall & Injury Prevention, visit: [email or web address] OR  [email or web address] OR  https://www.cdc.gov/steadi/patient.html

## 2022-11-09 ENCOUNTER — APPOINTMENT (OUTPATIENT)
Dept: HEMATOLOGY ONCOLOGY | Facility: CLINIC | Age: 82
End: 2022-11-09

## 2022-11-09 LAB
CULTURE RESULTS: SIGNIFICANT CHANGE UP
SPECIMEN SOURCE: SIGNIFICANT CHANGE UP

## 2022-11-14 ENCOUNTER — APPOINTMENT (OUTPATIENT)
Dept: HEMATOLOGY ONCOLOGY | Facility: CLINIC | Age: 82
End: 2022-11-14

## 2022-11-14 VITALS
SYSTOLIC BLOOD PRESSURE: 109 MMHG | BODY MASS INDEX: 20.33 KG/M2 | DIASTOLIC BLOOD PRESSURE: 55 MMHG | OXYGEN SATURATION: 96 % | RESPIRATION RATE: 17 BRPM | HEART RATE: 100 BPM | WEIGHT: 102.19 LBS | TEMPERATURE: 98.3 F

## 2022-11-14 DIAGNOSIS — C82.00 FOLLICULAR LYMPHOMA GRADE I, UNSPECIFIED SITE: ICD-10-CM

## 2022-11-14 PROBLEM — I10 ESSENTIAL (PRIMARY) HYPERTENSION: Chronic | Status: ACTIVE | Noted: 2022-11-03

## 2022-11-14 PROBLEM — F17.200 NICOTINE DEPENDENCE, UNSPECIFIED, UNCOMPLICATED: Chronic | Status: ACTIVE | Noted: 2022-11-03

## 2022-11-14 PROBLEM — Z87.438 PERSONAL HISTORY OF OTHER DISEASES OF MALE GENITAL ORGANS: Chronic | Status: ACTIVE | Noted: 2022-11-03

## 2022-11-14 PROBLEM — E78.5 HYPERLIPIDEMIA, UNSPECIFIED: Chronic | Status: ACTIVE | Noted: 2022-11-03

## 2022-11-14 PROBLEM — C85.90 NON-HODGKIN LYMPHOMA, UNSPECIFIED, UNSPECIFIED SITE: Chronic | Status: ACTIVE | Noted: 2022-11-03

## 2022-11-14 PROCEDURE — 99215 OFFICE O/P EST HI 40 MIN: CPT

## 2022-11-14 NOTE — PHYSICAL EXAM
[Restricted in physically strenuous activity but ambulatory and able to carry out work of a light or sedentary nature] : Status 1- Restricted in physically strenuous activity but ambulatory and able to carry out work of a light or sedentary nature, e.g., light house work, office work [Normal] : affect appropriate [Thin] : thin [de-identified] : multiple b/l firm cervical LNS  [de-identified] : slightly diminished L chest  [de-identified] : regular with systolic murmur  [de-identified] : no pedal edema  [de-identified] : diffuse firm FERNANDO - neck, b/l axillary, b/l inguinal  [de-identified] : no overt skin lesion in area of chest wall uptake on PET

## 2022-11-14 NOTE — ASSESSMENT
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  Moderately bulky disease.\par \par Discussed options : rituximab alone versus rituximab plus chemotherapy  ( bendamustine versus CVP).\par Because of risk of infections associated with chemoimmunotherapy - will start with single agent rituximab and add chemotherapy only if no response.\par \par Discussed in details side effects of rituximab.\par he is Hep B core positive. No known hx of hepatitis.\par Will need antiviral prophylaxis during and for 6 months after rituximab treatment.\par Rx Entecavir 0.5 mg daily\par \par Return visit next week to start Rituxan.\par \par Evaluate response with scans ( preferably PET ) 6 weeks after Rituxan completed .\par \par Discussed with patient  and his son Bear

## 2022-11-14 NOTE — REVIEW OF SYSTEMS
[Patient Intake Form Reviewed] : Patient intake form was reviewed [Fatigue] : fatigue [Recent Change In Weight] : ~T recent weight change [Swollen Glands] : swollen glands [Negative] : Respiratory [Shortness Of Breath] : no shortness of breath [Cough] : no cough

## 2022-11-14 NOTE — HISTORY OF PRESENT ILLNESS
[de-identified] : LOBO RO is an 82 y.o. M with a PMH significant for HTN, HLD, current  1/2 PPD smoker x 65 years, and BPH, who has been referred to our office for an evaluation of an newly diagnosed lymphoma.\par \par 10/11/22 - Patient saw provider at [facility] with c/o 20 lbS  unintentional weight loss over past 2 months, no appetite, increased fatigue, and lymphadenopathy.  \par 10/8/22 - Has Abd US to evaluate of AAA - no evidence of AAA but showed extensive retroperitoneal adenopathy and splenomegaly.  Was sent for CT scans. \par 10/8/22 - CT Chest (?Lung cancer screening) -  Bulky multi station thoracoabdominal lymphadenopathy with splenomegaly, concerning for lymphoma.  Peripheral splenic hypodensity may represent an infarct or mass. Small right and moderate left pleural effusions with underlying pleural thickening on the left; consider cytologic correlation, as lymphomatous involvement is possible.\par \par 10/18/22 - CT A/P - Diffuse bulky para-aortic, periportal, and mesenteric lymphadenopathy. Bilateral bulky inguinal and iliac chain lymphadenopathy. For reference:\par * Pleural LN measures 5.4 x 4.5 cm.\par * Right inguinal LN measures 3.6 x 1.8 cm.\par * Confluent RPLAD surrounding the aorta measures 9.3 x 6.2 cm.\par \par 10/21/22 - PET/CT -  Large  and markedly avid conglomerate adenopathy in the neck, chest, abdomen, and pelvis ( SUV ranges 3-5) . Large loculated  left pleural effusion and small R pleural effusion.   Hypermetabolic 1 cm cutaneous lesion in the right chest wall. Recommend clinical inspection as neoplasm could have this appearance.\par \par 10/20/22 - Labs with elevated WBC 15K, Peripheral blood flow cytometry: Monotypic B-cells (10%) positive for Kappa, CD19, CD20, CD10, negative for CD5, , CD23. \par \par C/o fatigue. Weight  loss as above . No pain. No fevers . No night sweats. \par \par Here with son Bear.\par Lives with wife and son.\par \par 11/4/22 R axillary  LN biopsy :  follicular lymphoma grade 1. \par L thoracentesis 11/4/22  [de-identified] : No new issues. Did not notice any difference after thoracentesis.

## 2022-11-14 NOTE — REASON FOR VISIT
[Follow-Up Visit] : a follow-up visit for [Family Member] : family member [FreeTextEntry2] : follicular lymphoma

## 2022-11-22 DIAGNOSIS — R35.0 FREQUENCY OF MICTURITION: ICD-10-CM

## 2022-11-23 ENCOUNTER — NON-APPOINTMENT (OUTPATIENT)
Age: 82
End: 2022-11-23

## 2022-11-28 ENCOUNTER — APPOINTMENT (OUTPATIENT)
Dept: INFUSION THERAPY | Facility: CLINIC | Age: 82
End: 2022-11-28

## 2022-11-29 ENCOUNTER — NON-APPOINTMENT (OUTPATIENT)
Age: 82
End: 2022-11-29

## 2022-11-29 RX ORDER — TROSPIUM CHLORIDE 60 MG/1
60 CAPSULE, EXTENDED RELEASE ORAL
Qty: 90 | Refills: 1 | Status: ACTIVE | COMMUNITY
Start: 2022-11-22 | End: 1900-01-01

## 2022-11-30 ENCOUNTER — RESULT REVIEW (OUTPATIENT)
Age: 82
End: 2022-11-30

## 2022-11-30 ENCOUNTER — TRANSCRIPTION ENCOUNTER (OUTPATIENT)
Age: 82
End: 2022-11-30

## 2022-11-30 ENCOUNTER — APPOINTMENT (OUTPATIENT)
Dept: INFUSION THERAPY | Facility: CLINIC | Age: 82
End: 2022-11-30

## 2022-11-30 ENCOUNTER — NON-APPOINTMENT (OUTPATIENT)
Age: 82
End: 2022-11-30

## 2022-11-30 VITALS
DIASTOLIC BLOOD PRESSURE: 70 MMHG | OXYGEN SATURATION: 96 % | HEART RATE: 92 BPM | SYSTOLIC BLOOD PRESSURE: 113 MMHG | RESPIRATION RATE: 18 BRPM | WEIGHT: 99.06 LBS | TEMPERATURE: 97.8 F | HEIGHT: 60 IN | BODY MASS INDEX: 19.45 KG/M2

## 2022-11-30 LAB
ALBUMIN SERPL ELPH-MCNC: 4 G/DL — SIGNIFICANT CHANGE UP (ref 3.3–5)
ALP SERPL-CCNC: 112 U/L — SIGNIFICANT CHANGE UP (ref 40–120)
ALT FLD-CCNC: <5 U/L — LOW (ref 10–45)
ANION GAP SERPL CALC-SCNC: 12 MMOL/L — SIGNIFICANT CHANGE UP (ref 5–17)
ANISOCYTOSIS BLD QL: SLIGHT — SIGNIFICANT CHANGE UP
AST SERPL-CCNC: 16 U/L — SIGNIFICANT CHANGE UP (ref 10–40)
BASOPHILS # BLD AUTO: 0.34 K/UL — HIGH (ref 0–0.2)
BASOPHILS NFR BLD AUTO: 2 % — SIGNIFICANT CHANGE UP (ref 0–2)
BILIRUB SERPL-MCNC: 0.4 MG/DL — SIGNIFICANT CHANGE UP (ref 0.2–1.2)
BUN SERPL-MCNC: 24 MG/DL — HIGH (ref 7–23)
CALCIUM SERPL-MCNC: 9.2 MG/DL — SIGNIFICANT CHANGE UP (ref 8.4–10.5)
CHLORIDE SERPL-SCNC: 105 MMOL/L — SIGNIFICANT CHANGE UP (ref 96–108)
CO2 SERPL-SCNC: 24 MMOL/L — SIGNIFICANT CHANGE UP (ref 22–31)
CREAT SERPL-MCNC: 0.79 MG/DL — SIGNIFICANT CHANGE UP (ref 0.5–1.3)
EGFR: 90 ML/MIN/1.73M2 — SIGNIFICANT CHANGE UP
EOSINOPHIL # BLD AUTO: 1.03 K/UL — HIGH (ref 0–0.5)
EOSINOPHIL NFR BLD AUTO: 6 % — SIGNIFICANT CHANGE UP (ref 0–6)
GLUCOSE SERPL-MCNC: 84 MG/DL — SIGNIFICANT CHANGE UP (ref 70–99)
HCT VFR BLD CALC: 34.6 % — LOW (ref 39–50)
HGB BLD-MCNC: 11.1 G/DL — LOW (ref 13–17)
LDH SERPL L TO P-CCNC: 317 U/L — HIGH (ref 50–242)
LG PLATELETS BLD QL AUTO: SLIGHT — SIGNIFICANT CHANGE UP
LYMPHOCYTES # BLD AUTO: 41 % — SIGNIFICANT CHANGE UP (ref 13–44)
LYMPHOCYTES # BLD AUTO: 7.01 K/UL — HIGH (ref 1–3.3)
MACROCYTES BLD QL: SLIGHT — SIGNIFICANT CHANGE UP
MCHC RBC-ENTMCNC: 29.4 PG — SIGNIFICANT CHANGE UP (ref 27–34)
MCHC RBC-ENTMCNC: 32.1 GM/DL — SIGNIFICANT CHANGE UP (ref 32–36)
MCV RBC AUTO: 91.8 FL — SIGNIFICANT CHANGE UP (ref 80–100)
MICROCYTES BLD QL: SLIGHT — SIGNIFICANT CHANGE UP
MONOCYTES # BLD AUTO: 0.68 K/UL — SIGNIFICANT CHANGE UP (ref 0–0.9)
MONOCYTES NFR BLD AUTO: 4 % — SIGNIFICANT CHANGE UP (ref 2–14)
NEUTROPHILS # BLD AUTO: 7.18 K/UL — SIGNIFICANT CHANGE UP (ref 1.8–7.4)
NEUTROPHILS NFR BLD AUTO: 41 % — LOW (ref 43–77)
NEUTS BAND # BLD: 1 % — SIGNIFICANT CHANGE UP (ref 0–8)
NRBC # BLD: 0 /100 — SIGNIFICANT CHANGE UP (ref 0–0)
NRBC # BLD: SIGNIFICANT CHANGE UP /100 WBCS (ref 0–0)
OVALOCYTES BLD QL SMEAR: SLIGHT — SIGNIFICANT CHANGE UP
PLAT MORPH BLD: NORMAL — SIGNIFICANT CHANGE UP
PLATELET # BLD AUTO: 140 K/UL — LOW (ref 150–400)
POIKILOCYTOSIS BLD QL AUTO: SLIGHT — SIGNIFICANT CHANGE UP
POLYCHROMASIA BLD QL SMEAR: SLIGHT — SIGNIFICANT CHANGE UP
POTASSIUM SERPL-MCNC: 4.2 MMOL/L — SIGNIFICANT CHANGE UP (ref 3.5–5.3)
POTASSIUM SERPL-SCNC: 4.2 MMOL/L — SIGNIFICANT CHANGE UP (ref 3.5–5.3)
PROT SERPL-MCNC: 7.7 G/DL — SIGNIFICANT CHANGE UP (ref 6–8.3)
RBC # BLD: 3.77 M/UL — LOW (ref 4.2–5.8)
RBC # FLD: 14.7 % — HIGH (ref 10.3–14.5)
RBC BLD AUTO: SIGNIFICANT CHANGE UP
SODIUM SERPL-SCNC: 141 MMOL/L — SIGNIFICANT CHANGE UP (ref 135–145)
URATE SERPL-MCNC: 5.7 MG/DL — SIGNIFICANT CHANGE UP (ref 3.4–8.8)
VARIANT LYMPHS # BLD: 5 % — SIGNIFICANT CHANGE UP (ref 0–6)
WBC # BLD: 17.09 K/UL — HIGH (ref 3.8–10.5)
WBC # FLD AUTO: 17.09 K/UL — HIGH (ref 3.8–10.5)

## 2022-12-01 DIAGNOSIS — Z51.11 ENCOUNTER FOR ANTINEOPLASTIC CHEMOTHERAPY: ICD-10-CM

## 2022-12-01 DIAGNOSIS — R11.2 NAUSEA WITH VOMITING, UNSPECIFIED: ICD-10-CM

## 2022-12-03 LAB
CULTURE RESULTS: SIGNIFICANT CHANGE UP
SPECIMEN SOURCE: SIGNIFICANT CHANGE UP

## 2022-12-08 ENCOUNTER — RESULT REVIEW (OUTPATIENT)
Age: 82
End: 2022-12-08

## 2022-12-08 ENCOUNTER — APPOINTMENT (OUTPATIENT)
Dept: INFUSION THERAPY | Facility: CLINIC | Age: 82
End: 2022-12-08

## 2022-12-08 VITALS
OXYGEN SATURATION: 96 % | RESPIRATION RATE: 17 BRPM | SYSTOLIC BLOOD PRESSURE: 117 MMHG | DIASTOLIC BLOOD PRESSURE: 72 MMHG | HEART RATE: 97 BPM | WEIGHT: 99.31 LBS | TEMPERATURE: 97.7 F | BODY MASS INDEX: 20.06 KG/M2

## 2022-12-08 LAB
ALBUMIN SERPL ELPH-MCNC: 3.7 G/DL — SIGNIFICANT CHANGE UP (ref 3.3–5)
ALP SERPL-CCNC: 103 U/L — SIGNIFICANT CHANGE UP (ref 40–120)
ALT FLD-CCNC: 8 U/L — LOW (ref 10–45)
ANION GAP SERPL CALC-SCNC: 11 MMOL/L — SIGNIFICANT CHANGE UP (ref 5–17)
AST SERPL-CCNC: 14 U/L — SIGNIFICANT CHANGE UP (ref 10–40)
BASOPHILS # BLD AUTO: 0.13 K/UL — SIGNIFICANT CHANGE UP (ref 0–0.2)
BASOPHILS NFR BLD AUTO: 0.9 % — SIGNIFICANT CHANGE UP (ref 0–2)
BILIRUB SERPL-MCNC: 0.5 MG/DL — SIGNIFICANT CHANGE UP (ref 0.2–1.2)
BUN SERPL-MCNC: 21 MG/DL — SIGNIFICANT CHANGE UP (ref 7–23)
CALCIUM SERPL-MCNC: 9 MG/DL — SIGNIFICANT CHANGE UP (ref 8.4–10.5)
CHLORIDE SERPL-SCNC: 104 MMOL/L — SIGNIFICANT CHANGE UP (ref 96–108)
CO2 SERPL-SCNC: 23 MMOL/L — SIGNIFICANT CHANGE UP (ref 22–31)
CREAT SERPL-MCNC: 0.73 MG/DL — SIGNIFICANT CHANGE UP (ref 0.5–1.3)
EGFR: 92 ML/MIN/1.73M2 — SIGNIFICANT CHANGE UP
EOSINOPHIL # BLD AUTO: 0.81 K/UL — HIGH (ref 0–0.5)
EOSINOPHIL NFR BLD AUTO: 5.6 % — SIGNIFICANT CHANGE UP (ref 0–6)
GLUCOSE SERPL-MCNC: 148 MG/DL — HIGH (ref 70–99)
HCT VFR BLD CALC: 32.5 % — LOW (ref 39–50)
HGB BLD-MCNC: 10.7 G/DL — LOW (ref 13–17)
IMM GRANULOCYTES NFR BLD AUTO: 1.2 % — HIGH (ref 0–0.9)
LYMPHOCYTES # BLD AUTO: 24.9 % — SIGNIFICANT CHANGE UP (ref 13–44)
LYMPHOCYTES # BLD AUTO: 3.61 K/UL — HIGH (ref 1–3.3)
MCHC RBC-ENTMCNC: 29.2 PG — SIGNIFICANT CHANGE UP (ref 27–34)
MCHC RBC-ENTMCNC: 32.9 GM/DL — SIGNIFICANT CHANGE UP (ref 32–36)
MCV RBC AUTO: 88.8 FL — SIGNIFICANT CHANGE UP (ref 80–100)
MONOCYTES # BLD AUTO: 1.16 K/UL — HIGH (ref 0–0.9)
MONOCYTES NFR BLD AUTO: 8 % — SIGNIFICANT CHANGE UP (ref 2–14)
NEUTROPHILS # BLD AUTO: 8.61 K/UL — HIGH (ref 1.8–7.4)
NEUTROPHILS NFR BLD AUTO: 59.4 % — SIGNIFICANT CHANGE UP (ref 43–77)
NRBC # BLD: 0 /100 WBCS — SIGNIFICANT CHANGE UP (ref 0–0)
PLATELET # BLD AUTO: 168 K/UL — SIGNIFICANT CHANGE UP (ref 150–400)
POTASSIUM SERPL-MCNC: 3.9 MMOL/L — SIGNIFICANT CHANGE UP (ref 3.5–5.3)
POTASSIUM SERPL-SCNC: 3.9 MMOL/L — SIGNIFICANT CHANGE UP (ref 3.5–5.3)
PROT SERPL-MCNC: 6.9 G/DL — SIGNIFICANT CHANGE UP (ref 6–8.3)
RBC # BLD: 3.66 M/UL — LOW (ref 4.2–5.8)
RBC # FLD: 14.6 % — HIGH (ref 10.3–14.5)
SODIUM SERPL-SCNC: 138 MMOL/L — SIGNIFICANT CHANGE UP (ref 135–145)
URATE SERPL-MCNC: 6.5 MG/DL — SIGNIFICANT CHANGE UP (ref 3.4–8.8)
WBC # BLD: 14.5 K/UL — HIGH (ref 3.8–10.5)
WBC # FLD AUTO: 14.5 K/UL — HIGH (ref 3.8–10.5)

## 2022-12-12 ENCOUNTER — OUTPATIENT (OUTPATIENT)
Dept: OUTPATIENT SERVICES | Facility: HOSPITAL | Age: 82
LOS: 1 days | Discharge: HOME | End: 2022-12-12

## 2022-12-12 DIAGNOSIS — Z98.890 OTHER SPECIFIED POSTPROCEDURAL STATES: Chronic | ICD-10-CM

## 2022-12-12 DIAGNOSIS — C82.00 FOLLICULAR LYMPHOMA GRADE I, UNSPECIFIED SITE: ICD-10-CM

## 2022-12-13 PROBLEM — Z85.72 HISTORY OF B-CELL LYMPHOMA: Status: RESOLVED | Noted: 2022-10-31 | Resolved: 2022-12-13

## 2022-12-13 PROBLEM — Z87.898 HISTORY OF LYMPHADENOPATHY: Status: RESOLVED | Noted: 2022-10-12 | Resolved: 2022-12-13

## 2022-12-13 NOTE — ASU PREOP CHECKLIST - BP NONINVASIVE SYSTOLIC (MM HG)
12/13/22                            Kaycee [address]  [address] [facility] [address] [place] [address]    To Whom It May Concern:    This is to certify Kaycee Street was evaluated with Uyen Alegria DO on 12/13/22 and is excused from school on 12/13/22.     RESTRICTIONS: none        Electronically signed by:  Uyen Alegria DO  [address] [place] [address]  Dept Phone: [phone]      126

## 2022-12-14 ENCOUNTER — APPOINTMENT (OUTPATIENT)
Dept: INFUSION THERAPY | Facility: CLINIC | Age: 82
End: 2022-12-14

## 2022-12-14 ENCOUNTER — RESULT REVIEW (OUTPATIENT)
Age: 82
End: 2022-12-14

## 2022-12-14 ENCOUNTER — APPOINTMENT (OUTPATIENT)
Dept: HEMATOLOGY ONCOLOGY | Facility: CLINIC | Age: 82
End: 2022-12-14

## 2022-12-14 VITALS
HEART RATE: 102 BPM | DIASTOLIC BLOOD PRESSURE: 55 MMHG | SYSTOLIC BLOOD PRESSURE: 136 MMHG | WEIGHT: 102 LBS | OXYGEN SATURATION: 96 % | BODY MASS INDEX: 20.6 KG/M2 | TEMPERATURE: 97.8 F | RESPIRATION RATE: 18 BRPM

## 2022-12-14 DIAGNOSIS — Z85.72 PERSONAL HISTORY OF NON-HODGKIN LYMPHOMAS: ICD-10-CM

## 2022-12-14 DIAGNOSIS — Z87.898 PERSONAL HISTORY OF OTHER SPECIFIED CONDITIONS: ICD-10-CM

## 2022-12-14 LAB
BASOPHILS # BLD AUTO: 0.11 K/UL — SIGNIFICANT CHANGE UP (ref 0–0.2)
BASOPHILS NFR BLD AUTO: 0.9 % — SIGNIFICANT CHANGE UP (ref 0–2)
EOSINOPHIL # BLD AUTO: 1 K/UL — HIGH (ref 0–0.5)
EOSINOPHIL NFR BLD AUTO: 8.6 % — HIGH (ref 0–6)
HCT VFR BLD CALC: 33.8 % — LOW (ref 39–50)
HGB BLD-MCNC: 11 G/DL — LOW (ref 13–17)
IMM GRANULOCYTES NFR BLD AUTO: 1.3 % — HIGH (ref 0–0.9)
LYMPHOCYTES # BLD AUTO: 28.9 % — SIGNIFICANT CHANGE UP (ref 13–44)
LYMPHOCYTES # BLD AUTO: 3.37 K/UL — HIGH (ref 1–3.3)
MCHC RBC-ENTMCNC: 29.3 PG — SIGNIFICANT CHANGE UP (ref 27–34)
MCHC RBC-ENTMCNC: 32.5 GM/DL — SIGNIFICANT CHANGE UP (ref 32–36)
MCV RBC AUTO: 90.1 FL — SIGNIFICANT CHANGE UP (ref 80–100)
MONOCYTES # BLD AUTO: 0.97 K/UL — HIGH (ref 0–0.9)
MONOCYTES NFR BLD AUTO: 8.3 % — SIGNIFICANT CHANGE UP (ref 2–14)
NEUTROPHILS # BLD AUTO: 6.06 K/UL — SIGNIFICANT CHANGE UP (ref 1.8–7.4)
NEUTROPHILS NFR BLD AUTO: 52 % — SIGNIFICANT CHANGE UP (ref 43–77)
NRBC # BLD: 0 /100 WBCS — SIGNIFICANT CHANGE UP (ref 0–0)
PLATELET # BLD AUTO: 177 K/UL — SIGNIFICANT CHANGE UP (ref 150–400)
RBC # BLD: 3.75 M/UL — LOW (ref 4.2–5.8)
RBC # FLD: 14.6 % — HIGH (ref 10.3–14.5)
WBC # BLD: 11.66 K/UL — HIGH (ref 3.8–10.5)
WBC # FLD AUTO: 11.66 K/UL — HIGH (ref 3.8–10.5)

## 2022-12-15 DIAGNOSIS — Z51.11 ENCOUNTER FOR ANTINEOPLASTIC CHEMOTHERAPY: ICD-10-CM

## 2022-12-15 DIAGNOSIS — R11.2 NAUSEA WITH VOMITING, UNSPECIFIED: ICD-10-CM

## 2022-12-21 ENCOUNTER — APPOINTMENT (OUTPATIENT)
Dept: HEMATOLOGY ONCOLOGY | Facility: CLINIC | Age: 82
End: 2022-12-21

## 2022-12-21 ENCOUNTER — RESULT REVIEW (OUTPATIENT)
Age: 82
End: 2022-12-21

## 2022-12-21 ENCOUNTER — APPOINTMENT (OUTPATIENT)
Dept: HEMATOLOGY ONCOLOGY | Facility: CLINIC | Age: 82
End: 2022-12-21
Payer: MEDICARE

## 2022-12-21 ENCOUNTER — APPOINTMENT (OUTPATIENT)
Dept: INFUSION THERAPY | Facility: CLINIC | Age: 82
End: 2022-12-21
Payer: MEDICARE

## 2022-12-21 VITALS
RESPIRATION RATE: 18 BRPM | DIASTOLIC BLOOD PRESSURE: 76 MMHG | WEIGHT: 102.31 LBS | TEMPERATURE: 98.2 F | BODY MASS INDEX: 20.09 KG/M2 | SYSTOLIC BLOOD PRESSURE: 135 MMHG | OXYGEN SATURATION: 95 % | HEART RATE: 101 BPM | HEIGHT: 60 IN

## 2022-12-21 LAB
ANISOCYTOSIS BLD QL: SLIGHT — SIGNIFICANT CHANGE UP
BASOPHILS # BLD AUTO: 0.13 K/UL — SIGNIFICANT CHANGE UP (ref 0–0.2)
BASOPHILS NFR BLD AUTO: 1 % — SIGNIFICANT CHANGE UP (ref 0–2)
EOSINOPHIL # BLD AUTO: 0.53 K/UL — HIGH (ref 0–0.5)
EOSINOPHIL NFR BLD AUTO: 4 % — SIGNIFICANT CHANGE UP (ref 0–6)
HCT VFR BLD CALC: 34.4 % — LOW (ref 39–50)
HGB BLD-MCNC: 11 G/DL — LOW (ref 13–17)
HYPOCHROMIA BLD QL: SLIGHT — SIGNIFICANT CHANGE UP
LG PLATELETS BLD QL AUTO: SLIGHT — SIGNIFICANT CHANGE UP
LYMPHOCYTES # BLD AUTO: 42 % — SIGNIFICANT CHANGE UP (ref 13–44)
LYMPHOCYTES # BLD AUTO: 5.52 K/UL — HIGH (ref 1–3.3)
MACROCYTES BLD QL: SLIGHT — SIGNIFICANT CHANGE UP
MCHC RBC-ENTMCNC: 28.9 PG — SIGNIFICANT CHANGE UP (ref 27–34)
MCHC RBC-ENTMCNC: 32 GM/DL — SIGNIFICANT CHANGE UP (ref 32–36)
MCV RBC AUTO: 90.3 FL — SIGNIFICANT CHANGE UP (ref 80–100)
MICROCYTES BLD QL: SLIGHT — SIGNIFICANT CHANGE UP
MONOCYTES # BLD AUTO: 0.79 K/UL — SIGNIFICANT CHANGE UP (ref 0–0.9)
MONOCYTES NFR BLD AUTO: 6 % — SIGNIFICANT CHANGE UP (ref 2–14)
NEUTROPHILS # BLD AUTO: 5.79 K/UL — SIGNIFICANT CHANGE UP (ref 1.8–7.4)
NEUTROPHILS NFR BLD AUTO: 42 % — LOW (ref 43–77)
NEUTS BAND # BLD: 2 % — SIGNIFICANT CHANGE UP (ref 0–8)
NRBC # BLD: 0 /100 — SIGNIFICANT CHANGE UP (ref 0–0)
NRBC # BLD: SIGNIFICANT CHANGE UP /100 WBCS (ref 0–0)
OVALOCYTES BLD QL SMEAR: SLIGHT — SIGNIFICANT CHANGE UP
PLAT MORPH BLD: NORMAL — SIGNIFICANT CHANGE UP
PLATELET # BLD AUTO: 203 K/UL — SIGNIFICANT CHANGE UP (ref 150–400)
POIKILOCYTOSIS BLD QL AUTO: SLIGHT — SIGNIFICANT CHANGE UP
RBC # BLD: 3.81 M/UL — LOW (ref 4.2–5.8)
RBC # FLD: 14.4 % — SIGNIFICANT CHANGE UP (ref 10.3–14.5)
RBC BLD AUTO: SIGNIFICANT CHANGE UP
VARIANT LYMPHS # BLD: 3 % — SIGNIFICANT CHANGE UP (ref 0–6)
WBC # BLD: 13.15 K/UL — HIGH (ref 3.8–10.5)
WBC # FLD AUTO: 13.15 K/UL — HIGH (ref 3.8–10.5)

## 2022-12-21 PROCEDURE — 99214 OFFICE O/P EST MOD 30 MIN: CPT

## 2023-01-01 NOTE — PHYSICAL EXAM
[de-identified] : anicteric [de-identified] : no c/c/e [de-identified] : multiple b/l firm cervical LN's [de-identified] : diffuse firm FERNANDO - neck, b/l axillary, b/l inguinal  [de-identified] : no rashes

## 2023-01-01 NOTE — HISTORY OF PRESENT ILLNESS
[de-identified] : LOBO RO is an 82 y.o. M with a PMH significant for HTN, HLD, current  1/2 PPD smoker x 65 years, and BPH, who we are following for a follicular lymphoma\par \par 10/11/22 - Patient saw provider at [facility] with c/o 20 lb  unintentional weight loss over past 2 months, no appetite, increased fatigue, and lymphadenopathy.  \par 10/8/22 - Has Abd US to evaluate of AAA - no evidence of AAA but showed extensive retroperitoneal adenopathy and splenomegaly.  Was sent for CT scans. \par 10/8/22 - CT Chest (?Lung cancer screening) -  Bulky multi station thoracoabdominal lymphadenopathy with splenomegaly, concerning for lymphoma.  Peripheral splenic hypodensity may represent an infarct or mass. Small right and moderate left pleural effusions with underlying pleural thickening on the left; consider cytologic correlation, as lymphomatous involvement is possible.\par \par 10/18/22 - CT A/P - Diffuse bulky para-aortic, periportal, and mesenteric lymphadenopathy. Bilateral bulky inguinal and iliac chain lymphadenopathy. For reference:\par * Pleural LN measures 5.4 x 4.5 cm.\par * Right inguinal LN measures 3.6 x 1.8 cm.\par * Confluent RPLAD surrounding the aorta measures 9.3 x 6.2 cm.\par \par 10/21/22 - PET/CT -  Large and markedly avid conglomerate adenopathy in the neck, chest, abdomen, and pelvis (SUV ranges 3-5) . Large loculated left pleural effusion and small R pleural effusion.  Hypermetabolic 1 cm cutaneous lesion in the right chest wall. Recommend clinical inspection as neoplasm could have this appearance.\par \par 10/20/22 - Labs with elevated WBC 15K, Peripheral blood flow cytometry: Monotypic B-cells (10%) positive for Kappa, CD19, CD20, CD10, negative for CD5, , CD23. \par \par 11/4/22 - R axillary LN biopsy - follicular lymphoma grade 1. \par 11/4/22 - L thoracentesis \par \par 11/30/22 - Started on weekly Rituxan x 4.  \par Was Hep B core positive - started on Entecavir antiviral prophylaxis.  [de-identified] : At least stage III [de-identified] : Follicular lymphoma, grade 1 [de-identified] : Patient reports he does not feel any change since starting on therapy.  Major complaint is that he has profound fatigue - sleep most of day.  Saleh also states he has an appetite, but is frustrated that he is not gaining any weight.  He reports he feels hot, but denies fevers.  \par Subjectively, his palpable lymphadenopathy does not feel any different. \par Denies any other changes in his family, medical, or social history since his last visit of 11/14/22.

## 2023-01-01 NOTE — REVIEW OF SYSTEMS
[Cough] : cough [FreeTextEntry2] : weight steady over past month; feels hot but denies fevers [FreeTextEntry6] : nonproductive [FreeTextEntry8] : frequency [FreeTextEntry7] : reports flatus has a bad odor [de-identified] : feels lymphadenopathy without change

## 2023-01-01 NOTE — ASSESSMENT
[FreeTextEntry1] : Patient is an 82 y.o. with a  follicular lymphoma, grade 1, at least stage 3.\par Presented with moderately bulky disease, symptomatic with weight loss, fatigue, and SOB (pleural effusion).   \par \par Because of risk of infections associated with chemoimmunotherapy - will start with single agent rituximab and add chemotherapy only if no response.\par He is Hep B core positive. No known hx of hepatitis.\par Started on antiviral prophylaxis with Entecavir 0.5 mg daily - will need to continue for months after rituximab treatment.\par 11/30/22 - Started on Rituxan.\par Today week 4/4.  \par \par Plan - \par No apparent response to treatment yet. \par Reviewed with Dr. Chand.  May take more time to "kick in".  \par Repeat scans (preferably PET) 4 -6 weeks after Rituxan completed (due early Feb). \par PE and scan results 2/13/23.\par If no improvement may need to proceed with chemotherapy. \par Continue on Entecavir for  Hep B core Lauren positive.\par \par PCP TARIQ JAMES \par Vito Duque\par Bernard Wild

## 2023-01-04 ENCOUNTER — APPOINTMENT (OUTPATIENT)
Dept: UROLOGY | Facility: CLINIC | Age: 83
End: 2023-01-04
Payer: MEDICARE

## 2023-01-04 PROCEDURE — 99214 OFFICE O/P EST MOD 30 MIN: CPT

## 2023-01-04 RX ORDER — SOLIFENACIN SUCCINATE 10 MG/1
10 TABLET ORAL
Qty: 30 | Refills: 5 | Status: ACTIVE | COMMUNITY
Start: 2023-01-04 | End: 1900-01-01

## 2023-01-04 NOTE — ASSESSMENT
[FreeTextEntry1] : Reviewed records.\par Discussed labs. \par \par Calcium Oxalate crystals in urine: \par Recommended good oral hydration. \par Will get Renal and Bladder Ultrasound. \par \par Benign Prostatic Hyperplasia:\par Discussed treatment options, will continue Flomax and Finasteride. \par \par Overactive Bladder:\par Discussed treatment options for Overactive Bladder- medical therapy with Anticholinergics or B3 agonists Vs Cystoscopy and Botox injections Vs Neuromodulation with percutaneous tibial nerve stimulation or Interstim after appropriate work up. Risks and benefits of each were discussed. \par Will try Solifenacin. \par Explained side effects- dryness of eyes and mouth, thirst, blurred vision, dyspepsia, nausea, fatigue, somnolence, confusion, headache, constipation and difficulty urinating.\par Asked patient to call us and update us in a few weeks, may need dose increase. \par If still not adequate response or concerning side effects will appeal for Myrbetriq.\par \par Return to office in 6 months or sooner if any issues: will do Uroflo/PVR.

## 2023-01-04 NOTE — LETTER BODY
[Dear  ___] : Dear  [unfilled], [Courtesy Letter:] : I had the pleasure of seeing your patient, [unfilled], in my office today. [Please see my note below.] : Please see my note below. [Sincerely,] : Sincerely, [FreeTextEntry3] : Flaco Baptiste MD\par  of Urology\par [facility]\par \par [facility]\par Offices:\par [address] [facility], [facility]\par [address] [address]\par [address] [facility], [address]\par \par TEL: [phone]\par FAX: [phone]

## 2023-01-04 NOTE — HISTORY OF PRESENT ILLNESS
[FreeTextEntry1] : Patient primarily Cantonese speaking, with limited English. \par Visit conducted with help of accompanying Son who was translating. \par Taking Flomax and Finasteride and Trospium. Myrbetriq was expensive. \par Has reasonable stream. Still every hour or so during the day, nocturia 2 to 3 x. \par Has more urgency and urinary incontinence than when on Myrbetriq samples. \par Complaining of dryness of mouth. \par \par Has been diagnosed with Lymphoma, getting Chemotherapy.  \par \par Seen on 10/7/22. \par On Flomax and Finasteride. Had stopped and was restarted 1 week ago. No difference in urination. \par Reported reasonable stream, urinated every hour or so during the day. Nocturia of 2 x. \par Denied hesitancy, straining, intermittency, urgency, incontinence, sense of incomplete emptying.\par Denied dysuria, hematuria, lower abdominal or flank pain, fever, chills or rigors.\par No family history of Prostate cancer. \par Has had 30 lbs weight loss in last 6 months. \par Smoker. 0.5 PPD for 50 years. \par \par Saw Dr Husain in the past. \par

## 2023-01-14 ENCOUNTER — APPOINTMENT (OUTPATIENT)
Dept: NUCLEAR MEDICINE | Facility: CLINIC | Age: 83
End: 2023-01-14
Payer: MEDICARE

## 2023-01-14 ENCOUNTER — OUTPATIENT (OUTPATIENT)
Dept: OUTPATIENT SERVICES | Facility: HOSPITAL | Age: 83
LOS: 1 days | End: 2023-01-14
Payer: MEDICARE

## 2023-01-14 DIAGNOSIS — C82.00 FOLLICULAR LYMPHOMA GRADE I, UNSPECIFIED SITE: ICD-10-CM

## 2023-01-14 DIAGNOSIS — Z98.890 OTHER SPECIFIED POSTPROCEDURAL STATES: Chronic | ICD-10-CM

## 2023-01-14 PROCEDURE — 78815 PET IMAGE W/CT SKULL-THIGH: CPT

## 2023-01-14 PROCEDURE — A9552: CPT

## 2023-01-14 PROCEDURE — 78815 PET IMAGE W/CT SKULL-THIGH: CPT | Mod: 26,PS

## 2023-01-21 ENCOUNTER — APPOINTMENT (OUTPATIENT)
Dept: ULTRASOUND IMAGING | Facility: CLINIC | Age: 83
End: 2023-01-21
Payer: MEDICARE

## 2023-01-21 ENCOUNTER — OUTPATIENT (OUTPATIENT)
Dept: OUTPATIENT SERVICES | Facility: HOSPITAL | Age: 83
LOS: 1 days | End: 2023-01-21
Payer: MEDICARE

## 2023-01-21 DIAGNOSIS — N32.81 OVERACTIVE BLADDER: ICD-10-CM

## 2023-01-21 DIAGNOSIS — Z98.890 OTHER SPECIFIED POSTPROCEDURAL STATES: Chronic | ICD-10-CM

## 2023-01-21 DIAGNOSIS — N40.1 BENIGN PROSTATIC HYPERPLASIA WITH LOWER URINARY TRACT SYMPTOMS: ICD-10-CM

## 2023-01-21 PROCEDURE — 76770 US EXAM ABDO BACK WALL COMP: CPT | Mod: 26

## 2023-01-21 PROCEDURE — 76770 US EXAM ABDO BACK WALL COMP: CPT

## 2023-01-26 ENCOUNTER — NON-APPOINTMENT (OUTPATIENT)
Age: 83
End: 2023-01-26

## 2023-02-03 ENCOUNTER — RESULT REVIEW (OUTPATIENT)
Age: 83
End: 2023-02-03

## 2023-02-03 ENCOUNTER — APPOINTMENT (OUTPATIENT)
Dept: HEMATOLOGY ONCOLOGY | Facility: CLINIC | Age: 83
End: 2023-02-03
Payer: MEDICARE

## 2023-02-03 VITALS
WEIGHT: 96.44 LBS | HEIGHT: 60 IN | TEMPERATURE: 98.2 F | BODY MASS INDEX: 18.93 KG/M2 | OXYGEN SATURATION: 95 % | RESPIRATION RATE: 18 BRPM | SYSTOLIC BLOOD PRESSURE: 145 MMHG | HEART RATE: 87 BPM | DIASTOLIC BLOOD PRESSURE: 76 MMHG

## 2023-02-03 LAB
ALBUMIN SERPL ELPH-MCNC: 3.9 G/DL — SIGNIFICANT CHANGE UP (ref 3.3–5)
ALP SERPL-CCNC: 89 U/L — SIGNIFICANT CHANGE UP (ref 40–120)
ALT FLD-CCNC: 6 U/L — LOW (ref 10–45)
ANION GAP SERPL CALC-SCNC: 13 MMOL/L — SIGNIFICANT CHANGE UP (ref 5–17)
AST SERPL-CCNC: 14 U/L — SIGNIFICANT CHANGE UP (ref 10–40)
BASOPHILS # BLD AUTO: 0.13 K/UL — SIGNIFICANT CHANGE UP (ref 0–0.2)
BASOPHILS NFR BLD AUTO: 1.2 % — SIGNIFICANT CHANGE UP (ref 0–2)
BILIRUB SERPL-MCNC: 0.5 MG/DL — SIGNIFICANT CHANGE UP (ref 0.2–1.2)
BUN SERPL-MCNC: 25 MG/DL — HIGH (ref 7–23)
CALCIUM SERPL-MCNC: 8.8 MG/DL — SIGNIFICANT CHANGE UP (ref 8.4–10.5)
CHLORIDE SERPL-SCNC: 104 MMOL/L — SIGNIFICANT CHANGE UP (ref 96–108)
CO2 SERPL-SCNC: 25 MMOL/L — SIGNIFICANT CHANGE UP (ref 22–31)
CREAT SERPL-MCNC: 0.93 MG/DL — SIGNIFICANT CHANGE UP (ref 0.5–1.3)
EGFR: 83 ML/MIN/1.73M2 — SIGNIFICANT CHANGE UP
EOSINOPHIL # BLD AUTO: 0.93 K/UL — HIGH (ref 0–0.5)
EOSINOPHIL NFR BLD AUTO: 8.7 % — HIGH (ref 0–6)
GLUCOSE SERPL-MCNC: 109 MG/DL — HIGH (ref 70–99)
HCT VFR BLD CALC: 38.6 % — LOW (ref 39–50)
HGB BLD-MCNC: 12.5 G/DL — LOW (ref 13–17)
IMM GRANULOCYTES NFR BLD AUTO: 1.6 % — HIGH (ref 0–0.9)
LDH SERPL L TO P-CCNC: 289 U/L — HIGH (ref 50–242)
LYMPHOCYTES # BLD AUTO: 3.93 K/UL — HIGH (ref 1–3.3)
LYMPHOCYTES # BLD AUTO: 37 % — SIGNIFICANT CHANGE UP (ref 13–44)
MCHC RBC-ENTMCNC: 28.3 PG — SIGNIFICANT CHANGE UP (ref 27–34)
MCHC RBC-ENTMCNC: 32.4 GM/DL — SIGNIFICANT CHANGE UP (ref 32–36)
MCV RBC AUTO: 87.3 FL — SIGNIFICANT CHANGE UP (ref 80–100)
MONOCYTES # BLD AUTO: 0.89 K/UL — SIGNIFICANT CHANGE UP (ref 0–0.9)
MONOCYTES NFR BLD AUTO: 8.4 % — SIGNIFICANT CHANGE UP (ref 2–14)
NEUTROPHILS # BLD AUTO: 4.58 K/UL — SIGNIFICANT CHANGE UP (ref 1.8–7.4)
NEUTROPHILS NFR BLD AUTO: 43.1 % — SIGNIFICANT CHANGE UP (ref 43–77)
NRBC # BLD: 0 /100 WBCS — SIGNIFICANT CHANGE UP (ref 0–0)
PLATELET # BLD AUTO: 171 K/UL — SIGNIFICANT CHANGE UP (ref 150–400)
POTASSIUM SERPL-MCNC: 4.2 MMOL/L — SIGNIFICANT CHANGE UP (ref 3.5–5.3)
POTASSIUM SERPL-SCNC: 4.2 MMOL/L — SIGNIFICANT CHANGE UP (ref 3.5–5.3)
PROT SERPL-MCNC: 7.1 G/DL — SIGNIFICANT CHANGE UP (ref 6–8.3)
RBC # BLD: 4.42 M/UL — SIGNIFICANT CHANGE UP (ref 4.2–5.8)
RBC # FLD: 14.4 % — SIGNIFICANT CHANGE UP (ref 10.3–14.5)
SODIUM SERPL-SCNC: 142 MMOL/L — SIGNIFICANT CHANGE UP (ref 135–145)
WBC # BLD: 10.63 K/UL — HIGH (ref 3.8–10.5)
WBC # FLD AUTO: 10.63 K/UL — HIGH (ref 3.8–10.5)

## 2023-02-03 PROCEDURE — 99214 OFFICE O/P EST MOD 30 MIN: CPT

## 2023-02-03 NOTE — HISTORY OF PRESENT ILLNESS
[de-identified] : LOBO RO is an 82 y.o. M with a PMH significant for HTN, HLD, current  1/2 PPD smoker x 65 years, and BPH, who has been referred to our office for an evaluation of an newly diagnosed lymphoma.\par \par 10/11/22 - Patient saw provider at [facility] with c/o 20 lbS  unintentional weight loss over past 2 months, no appetite, increased fatigue, and lymphadenopathy.  \par 10/8/22 - Has Abd US to evaluate of AAA - no evidence of AAA but showed extensive retroperitoneal adenopathy and splenomegaly.  Was sent for CT scans. \par 10/8/22 - CT Chest (?Lung cancer screening) -  Bulky multi station thoracoabdominal lymphadenopathy with splenomegaly, concerning for lymphoma.  Peripheral splenic hypodensity may represent an infarct or mass. Small right and moderate left pleural effusions with underlying pleural thickening on the left; consider cytologic correlation, as lymphomatous involvement is possible.\par \par 10/18/22 - CT A/P - Diffuse bulky para-aortic, periportal, and mesenteric lymphadenopathy. Bilateral bulky inguinal and iliac chain lymphadenopathy. For reference:\par * Pleural LN measures 5.4 x 4.5 cm.\par * Right inguinal LN measures 3.6 x 1.8 cm.\par * Confluent RPLAD surrounding the aorta measures 9.3 x 6.2 cm.\par \par 10/21/22 - PET/CT -  Large  and markedly avid conglomerate adenopathy in the neck, chest, abdomen, and pelvis ( SUV ranges 3-5) . Large loculated  left pleural effusion and small R pleural effusion.   Hypermetabolic 1 cm cutaneous lesion in the right chest wall. Recommend clinical inspection as neoplasm could have this appearance.\par \par 10/20/22 - Labs with elevated WBC 15K, Peripheral blood flow cytometry: Monotypic B-cells (10%) positive for Kappa, CD19, CD20, CD10, negative for CD5, , CD23. \par \par C/o fatigue. Weight  loss as above . No pain. No fevers . No night sweats. \par \par 11/4/22 R axillary  LN biopsy :  follicular lymphoma grade 1. \par L thoracentesis 11/4/22 \par \par Rituxan weekly x 4  11/30/22- 12/14/22 \par \par Hep B core positive. started on Entecavir antiviral prophylaxis\par \par PET 1/14/23 : compared to Oct 2022- no response - bulky FERNANDO and L pleural effusion no change. Evidence of splenic and small bowel involvement.  [de-identified] : Feels the same- chronic fatigue, chronic cough ( cough spasms ). denies dyspnea. Lost few lbs but states that he is eating normally.

## 2023-02-03 NOTE — ASSESSMENT
[FreeTextEntry1] : 81 y/o male with  follicular lymphoma grade 1 Stage at least 3. Diagnosed in 2022\par Indications for treatment: symptoms ( weight  loss, fatigue).  Moderately bulky disease.\par \par Initially opted for immunotherapy only.\par \par S/p Rituxan x 4 completed 12/14/22. No response on follow up PET scan.\par \par Recommend next line of therapy : refer bendamustine at reduced dose / Rituxan x 4 cycles with growth factor support. Another option would be revlimid/ rituxan but probably response will be less\par Discussed side effects of chemotherapy in details, especially risks of infections.\par \par He is very reluctant to do any therapy but will think about it- might agree in March/ April. Explained that concern that he will become more symptomatic if not treated\par \par he is Hep B core positive. No known hx of hepatitis.\par Continue antiviral prophylaxis during and for 6 months after rituximab treatment.\par On  Entecavir 0.5 mg daily\par \par Return visit in 4-6 weeks/ sooner PRN \par Discussed with patient  and his son Bear

## 2023-02-03 NOTE — REVIEW OF SYSTEMS
[Patient Intake Form Reviewed] : Patient intake form was reviewed [Cough] : cough [Negative] : Allergic/Immunologic [FreeTextEntry2] : per HPI  [FreeTextEntry6] : chronic  [FreeTextEntry8] : frequency- seeing urology for this

## 2023-02-03 NOTE — REASON FOR VISIT
[Follow-Up Visit] : a follow-up visit for [Family Member] : family member [FreeTextEntry2] : follicular lymphoma s/p rituxan

## 2023-02-03 NOTE — PHYSICAL EXAM
[Restricted in physically strenuous activity but ambulatory and able to carry out work of a light or sedentary nature] : Status 1- Restricted in physically strenuous activity but ambulatory and able to carry out work of a light or sedentary nature, e.g., light house work, office work [Thin] : thin [Normal] : affect appropriate [de-identified] : multiple b/l firm cervical LNS  [de-identified] : diminished L chest  [de-identified] : regular with systolic murmur  [de-identified] : no pedal edema  [de-identified] : diffuse firm FERNANDO - neck, b/l axillary, b/l inguinal

## 2023-02-10 ENCOUNTER — APPOINTMENT (OUTPATIENT)
Dept: UROLOGY | Facility: CLINIC | Age: 83
End: 2023-02-10
Payer: MEDICARE

## 2023-02-10 VITALS
HEIGHT: 60 IN | TEMPERATURE: 98.3 F | DIASTOLIC BLOOD PRESSURE: 58 MMHG | RESPIRATION RATE: 18 BRPM | SYSTOLIC BLOOD PRESSURE: 113 MMHG | WEIGHT: 96 LBS | BODY MASS INDEX: 18.85 KG/M2 | HEART RATE: 89 BPM | OXYGEN SATURATION: 93 %

## 2023-02-10 DIAGNOSIS — N32.81 OVERACTIVE BLADDER: ICD-10-CM

## 2023-02-10 DIAGNOSIS — N13.8 BENIGN PROSTATIC HYPERPLASIA WITH LOWER URINARY TRACT SYMPMS: ICD-10-CM

## 2023-02-10 DIAGNOSIS — N28.1 CYST OF KIDNEY, ACQUIRED: ICD-10-CM

## 2023-02-10 DIAGNOSIS — N40.1 BENIGN PROSTATIC HYPERPLASIA WITH LOWER URINARY TRACT SYMPMS: ICD-10-CM

## 2023-02-10 PROCEDURE — 99214 OFFICE O/P EST MOD 30 MIN: CPT

## 2023-02-25 PROBLEM — N40.1 BPH WITH OBSTRUCTION/LOWER URINARY TRACT SYMPTOMS: Status: ACTIVE | Noted: 2022-10-07

## 2023-02-25 NOTE — ASSESSMENT
[FreeTextEntry1] : Reviewed records.\par Discussed labs and imaging. \par \par Benign Prostatic Hyperplasia:\par Discussed treatment options, will continue Flomax and Finasteride. \par \par Overactive Bladder:\par Discussed treatment options, will continue Solifenacin. \par \par Renal cyst:\par Discussed that Renal cysts are a common finding on routine radiological studies. Autopsy studies in patients over the age of 50 reveal greater than a 50% chance of having at least one simple renal cyst.\par And that renal cysts may be classified as simple or complex depending how they look on imaging. Discussed complexity of renal cysts and its implications as regards malignancy. \par \par Kidney stone:\par Discussed the management options for non obstructing kidney stones- watch and wait Vs Ureteroscopy Vs Shock wave lithotripsy- if radio-opaque or ultrasound amenable. \par Risks and benefits of each modality were discussed. \par Patient will observe for now. \par  \par Bladder calculus:\par Discussed treatment options. Recommended Laser cystolithotripsy and litholapaxy.\par Discussed the procedure, risks and benefits of and the post operative course. \par Patient agreeable. \par Recommended CT Urogram. Will review CT scan and schedule next available.

## 2023-02-25 NOTE — LETTER BODY
[Dear  ___] : Dear  [unfilled], [Courtesy Letter:] : I had the pleasure of seeing your patient, [unfilled], in my office today. [Please see my note below.] : Please see my note below. [Sincerely,] : Sincerely, [FreeTextEntry3] : Flaco Baptiste MD\par  of Urology\par [facility]\par \par [facility]\par Offices:\par [address] [facility], [facility]\par [address] [address]\par [address] [facility], [address]\par \par TEL: [phone]\par FAX: [phone]

## 2023-02-25 NOTE — HISTORY OF PRESENT ILLNESS
[FreeTextEntry1] : Patient primarily Cantonese speaking, with limited English. \par Visit conducted with help of accompanying Son who was translating.\par \par 83 year old male presents for follow up. \par Had called to come in to discuss Renal and Bladder Ultrasound and further management. \par On Flomax and Finasteride and Solifenacin 10 mg. Same urination. \par Had reasonable stream. Still every hour or so during the day, nocturia 2 to 3 x. \par Had more urgency and urinary incontinence than when on Myrbetriq samples. \par Complained of dryness of mouth. \par \par Myrbetriq was expensive. \par \par Has been diagnosed with Lymphoma, getting Chemotherapy.  \par \par Seen on 10/7/22. \par On Flomax and Finasteride. Had stopped and was restarted 1 week ago. No difference in urination. \par Reported reasonable stream, urinated every hour or so during the day. Nocturia of 2 x. \par Denied hesitancy, straining, intermittency, urgency, incontinence, sense of incomplete emptying.\par Denied dysuria, hematuria, lower abdominal or flank pain, fever, chills or rigors.\par No family history of Prostate cancer. \par Has had 30 lbs weight loss in last 6 months. \par Smoker. 0.5 PPD for 50 years. \par \par Saw Dr Husain in the past. \par

## 2023-02-27 ENCOUNTER — NON-APPOINTMENT (OUTPATIENT)
Age: 83
End: 2023-02-27

## 2023-03-07 ENCOUNTER — APPOINTMENT (OUTPATIENT)
Dept: CT IMAGING | Facility: CLINIC | Age: 83
End: 2023-03-07
Payer: MEDICARE

## 2023-03-07 ENCOUNTER — OUTPATIENT (OUTPATIENT)
Dept: OUTPATIENT SERVICES | Facility: HOSPITAL | Age: 83
LOS: 1 days | End: 2023-03-07
Payer: MEDICARE

## 2023-03-07 DIAGNOSIS — N28.1 CYST OF KIDNEY, ACQUIRED: ICD-10-CM

## 2023-03-07 DIAGNOSIS — N21.0 CALCULUS IN BLADDER: ICD-10-CM

## 2023-03-07 DIAGNOSIS — N20.0 CALCULUS OF KIDNEY: ICD-10-CM

## 2023-03-07 DIAGNOSIS — Z98.890 OTHER SPECIFIED POSTPROCEDURAL STATES: Chronic | ICD-10-CM

## 2023-03-07 DIAGNOSIS — N32.81 OVERACTIVE BLADDER: ICD-10-CM

## 2023-03-07 PROCEDURE — 74178 CT ABD&PLV WO CNTR FLWD CNTR: CPT

## 2023-03-07 PROCEDURE — 74178 CT ABD&PLV WO CNTR FLWD CNTR: CPT | Mod: 26

## 2023-03-11 ENCOUNTER — OUTPATIENT (OUTPATIENT)
Dept: OUTPATIENT SERVICES | Facility: HOSPITAL | Age: 83
LOS: 1 days | Discharge: HOME | End: 2023-03-11

## 2023-03-11 DIAGNOSIS — Z98.890 OTHER SPECIFIED POSTPROCEDURAL STATES: Chronic | ICD-10-CM

## 2023-03-11 DIAGNOSIS — C82.00 FOLLICULAR LYMPHOMA GRADE I, UNSPECIFIED SITE: ICD-10-CM

## 2023-03-15 ENCOUNTER — NON-APPOINTMENT (OUTPATIENT)
Age: 83
End: 2023-03-15

## 2023-03-17 ENCOUNTER — RESULT REVIEW (OUTPATIENT)
Age: 83
End: 2023-03-17

## 2023-03-17 ENCOUNTER — APPOINTMENT (OUTPATIENT)
Dept: HEMATOLOGY ONCOLOGY | Facility: CLINIC | Age: 83
End: 2023-03-17
Payer: MEDICARE

## 2023-03-17 VITALS
RESPIRATION RATE: 18 BRPM | OXYGEN SATURATION: 95 % | TEMPERATURE: 97.3 F | WEIGHT: 96.5 LBS | HEIGHT: 60 IN | HEART RATE: 92 BPM | SYSTOLIC BLOOD PRESSURE: 114 MMHG | BODY MASS INDEX: 18.94 KG/M2 | DIASTOLIC BLOOD PRESSURE: 71 MMHG

## 2023-03-17 LAB
BASOPHILS # BLD AUTO: 0.15 K/UL — SIGNIFICANT CHANGE UP (ref 0–0.2)
BASOPHILS NFR BLD AUTO: 1 % — SIGNIFICANT CHANGE UP (ref 0–2)
EOSINOPHIL # BLD AUTO: 1.35 K/UL — HIGH (ref 0–0.5)
EOSINOPHIL NFR BLD AUTO: 9.3 % — HIGH (ref 0–6)
HCT VFR BLD CALC: 36.7 % — LOW (ref 39–50)
HGB BLD-MCNC: 12.1 G/DL — LOW (ref 13–17)
IMM GRANULOCYTES NFR BLD AUTO: 1 % — HIGH (ref 0–0.9)
LYMPHOCYTES # BLD AUTO: 30.2 % — SIGNIFICANT CHANGE UP (ref 13–44)
LYMPHOCYTES # BLD AUTO: 4.39 K/UL — HIGH (ref 1–3.3)
MCHC RBC-ENTMCNC: 29 PG — SIGNIFICANT CHANGE UP (ref 27–34)
MCHC RBC-ENTMCNC: 33 GM/DL — SIGNIFICANT CHANGE UP (ref 32–36)
MCV RBC AUTO: 88 FL — SIGNIFICANT CHANGE UP (ref 80–100)
MONOCYTES # BLD AUTO: 1.36 K/UL — HIGH (ref 0–0.9)
MONOCYTES NFR BLD AUTO: 9.4 % — SIGNIFICANT CHANGE UP (ref 2–14)
NEUTROPHILS # BLD AUTO: 7.14 K/UL — SIGNIFICANT CHANGE UP (ref 1.8–7.4)
NEUTROPHILS NFR BLD AUTO: 49.1 % — SIGNIFICANT CHANGE UP (ref 43–77)
NRBC # BLD: 0 /100 WBCS — SIGNIFICANT CHANGE UP (ref 0–0)
PLATELET # BLD AUTO: 175 K/UL — SIGNIFICANT CHANGE UP (ref 150–400)
RBC # BLD: 4.17 M/UL — LOW (ref 4.2–5.8)
RBC # FLD: 15.5 % — HIGH (ref 10.3–14.5)
WBC # BLD: 14.53 K/UL — HIGH (ref 3.8–10.5)
WBC # FLD AUTO: 14.53 K/UL — HIGH (ref 3.8–10.5)

## 2023-03-17 PROCEDURE — 99215 OFFICE O/P EST HI 40 MIN: CPT

## 2023-03-18 LAB
ALBUMIN SERPL ELPH-MCNC: 4.3 G/DL — SIGNIFICANT CHANGE UP (ref 3.3–5)
ALP SERPL-CCNC: 96 U/L — SIGNIFICANT CHANGE UP (ref 40–120)
ALT FLD-CCNC: 9 U/L — LOW (ref 10–45)
ANION GAP SERPL CALC-SCNC: 12 MMOL/L — SIGNIFICANT CHANGE UP (ref 5–17)
AST SERPL-CCNC: 14 U/L — SIGNIFICANT CHANGE UP (ref 10–40)
BILIRUB SERPL-MCNC: 0.4 MG/DL — SIGNIFICANT CHANGE UP (ref 0.2–1.2)
BUN SERPL-MCNC: 27 MG/DL — HIGH (ref 7–23)
CALCIUM SERPL-MCNC: 9.9 MG/DL — SIGNIFICANT CHANGE UP (ref 8.4–10.5)
CHLORIDE SERPL-SCNC: 105 MMOL/L — SIGNIFICANT CHANGE UP (ref 96–108)
CO2 SERPL-SCNC: 24 MMOL/L — SIGNIFICANT CHANGE UP (ref 22–31)
CREAT SERPL-MCNC: 0.77 MG/DL — SIGNIFICANT CHANGE UP (ref 0.5–1.3)
EGFR: 90 ML/MIN/1.73M2 — SIGNIFICANT CHANGE UP
GLUCOSE SERPL-MCNC: 91 MG/DL — SIGNIFICANT CHANGE UP (ref 70–99)
LDH SERPL L TO P-CCNC: 245 U/L — HIGH (ref 50–242)
POTASSIUM SERPL-MCNC: 4.4 MMOL/L — SIGNIFICANT CHANGE UP (ref 3.5–5.3)
POTASSIUM SERPL-SCNC: 4.4 MMOL/L — SIGNIFICANT CHANGE UP (ref 3.5–5.3)
PROT SERPL-MCNC: 7.2 G/DL — SIGNIFICANT CHANGE UP (ref 6–8.3)
SODIUM SERPL-SCNC: 141 MMOL/L — SIGNIFICANT CHANGE UP (ref 135–145)

## 2023-03-25 NOTE — ASSESSMENT
[FreeTextEntry1] : 81 y/o male with  follicular lymphoma grade 1 Stage at least 3. Diagnosed in 2022\par Indications for treatment: symptoms ( weight  loss, fatigue).  Moderately bulky disease.\par \par Initially opted for immunotherapy only.\par \par S/p Rituxan x 4 completed 12/14/22. No response on follow up PET scan.\par \par Recommend  next line of therapy : prefer bendamustine at reduced dose / Rituxan x 4 cycles with growth factor support. \par Discussed side effects of chemotherapy in details, especially risks of infections. \par He declined treatment in February . Feels the same and not anxious to start therapy.\par Explained that his lymphoma is progressing and he will become symptomatic in ear future\par His son also prefers his father to start treatment.\par \par Patient agrees to start treatment in April.\par \par he is Hep B core positive. No known hx of hepatitis.\par Continue antiviral prophylaxis.\par On  Entecavir 0.5 mg daily\par \par \par Plan:  Bendamustine 70 mg/m2 ( dose reduced) D1 and D2  and Rituxan 375 mg/m2 D1  every 28 days x up to 6 cycles.\par Consider VZV and PJP prophylaxis \par \par He will return for chemotherapy education in end of March.\par \par D/w patient and his son. \par Return visit in 4-6 weeks/ sooner PRN \par Discussed with patient  and his son Bear

## 2023-03-25 NOTE — REVIEW OF SYSTEMS
[Patient Intake Form Reviewed] : Patient intake form was reviewed [Cough] : cough [Negative] : Allergic/Immunologic [Fatigue] : fatigue [FreeTextEntry2] : per HPI  [FreeTextEntry6] : chronic  [FreeTextEntry8] : frequency- seeing urology for this

## 2023-03-25 NOTE — PHYSICAL EXAM
[Restricted in physically strenuous activity but ambulatory and able to carry out work of a light or sedentary nature] : Status 1- Restricted in physically strenuous activity but ambulatory and able to carry out work of a light or sedentary nature, e.g., light house work, office work [Thin] : thin [Normal] : affect appropriate [de-identified] : his baseline  [de-identified] : multiple b/l firm cervical LNS  [de-identified] : slightly diminished L base  [de-identified] : regular with systolic murmur  [de-identified] : no pedal edema  [de-identified] : diffuse firm FERNANDO - neck, b/l axillary, b/l inguinal

## 2023-03-25 NOTE — HISTORY OF PRESENT ILLNESS
[de-identified] : LOBO RO is an 82 y.o. M with a PMH significant for HTN, HLD, current  1/2 PPD smoker x 65 years, and BPH, who has been referred to our office for an evaluation of an newly diagnosed lymphoma.\par \par 10/11/22 - Patient saw provider at [facility] with c/o 20 lbS  unintentional weight loss over past 2 months, no appetite, increased fatigue, and lymphadenopathy.  \par 10/8/22 - Has Abd US to evaluate of AAA - no evidence of AAA but showed extensive retroperitoneal adenopathy and splenomegaly.  Was sent for CT scans. \par 10/8/22 - CT Chest (?Lung cancer screening) -  Bulky multi station thoracoabdominal lymphadenopathy with splenomegaly, concerning for lymphoma.  Peripheral splenic hypodensity may represent an infarct or mass. Small right and moderate left pleural effusions with underlying pleural thickening on the left; consider cytologic correlation, as lymphomatous involvement is possible.\par \par 10/18/22 - CT A/P - Diffuse bulky para-aortic, periportal, and mesenteric lymphadenopathy. Bilateral bulky inguinal and iliac chain lymphadenopathy. For reference:\par * Pleural LN measures 5.4 x 4.5 cm.\par * Right inguinal LN measures 3.6 x 1.8 cm.\par * Confluent RPLAD surrounding the aorta measures 9.3 x 6.2 cm.\par \par 10/21/22 - PET/CT -  Large  and markedly avid conglomerate adenopathy in the neck, chest, abdomen, and pelvis ( SUV ranges 3-5) . Large loculated  left pleural effusion and small R pleural effusion.   Hypermetabolic 1 cm cutaneous lesion in the right chest wall. Recommend clinical inspection as neoplasm could have this appearance.\par \par 10/20/22 - Labs with elevated WBC 15K, Peripheral blood flow cytometry: Monotypic B-cells (10%) positive for Kappa, CD19, CD20, CD10, negative for CD5, , CD23. \par \par C/o fatigue. Weight  loss as above . No pain. No fevers . No night sweats. \par \par 11/4/22 R axillary  LN biopsy :  follicular lymphoma grade 1. \par L thoracentesis 11/4/22 \par \par Rituxan weekly x 4  11/30/22- 12/14/22 \par \par Hep B core positive. started on Entecavir antiviral prophylaxis\par \par PET 1/14/23 : compared to Oct 2022- no response - bulky FERNANDO and L pleural effusion no change. Evidence of splenic and small bowel involvement.  [de-identified] : Feels the same. fatigue, no pain, no GI c/o , denies weight loss, sporadic cough. \par Here with his son

## 2023-04-03 ENCOUNTER — NON-APPOINTMENT (OUTPATIENT)
Age: 83
End: 2023-04-03

## 2023-04-03 ENCOUNTER — APPOINTMENT (OUTPATIENT)
Dept: HEMATOLOGY ONCOLOGY | Facility: CLINIC | Age: 83
End: 2023-04-03
Payer: MEDICARE

## 2023-04-03 VITALS
OXYGEN SATURATION: 94 % | WEIGHT: 95.13 LBS | HEIGHT: 60 IN | TEMPERATURE: 97.2 F | SYSTOLIC BLOOD PRESSURE: 89 MMHG | DIASTOLIC BLOOD PRESSURE: 41 MMHG | HEART RATE: 81 BPM | RESPIRATION RATE: 18 BRPM | BODY MASS INDEX: 18.68 KG/M2

## 2023-04-03 DIAGNOSIS — Z79.899 OTHER LONG TERM (CURRENT) DRUG THERAPY: ICD-10-CM

## 2023-04-03 PROCEDURE — 99214 OFFICE O/P EST MOD 30 MIN: CPT

## 2023-04-06 ENCOUNTER — NON-APPOINTMENT (OUTPATIENT)
Age: 83
End: 2023-04-06

## 2023-04-19 ENCOUNTER — RESULT REVIEW (OUTPATIENT)
Age: 83
End: 2023-04-19

## 2023-04-19 ENCOUNTER — APPOINTMENT (OUTPATIENT)
Dept: INFUSION THERAPY | Facility: CLINIC | Age: 83
End: 2023-04-19

## 2023-04-19 VITALS
SYSTOLIC BLOOD PRESSURE: 100 MMHG | HEART RATE: 78 BPM | OXYGEN SATURATION: 94 % | WEIGHT: 95.19 LBS | BODY MASS INDEX: 18.69 KG/M2 | DIASTOLIC BLOOD PRESSURE: 64 MMHG | HEIGHT: 60 IN | TEMPERATURE: 96.9 F | RESPIRATION RATE: 18 BRPM

## 2023-04-19 LAB
ALBUMIN SERPL ELPH-MCNC: 4.2 G/DL — SIGNIFICANT CHANGE UP (ref 3.3–5)
ALP SERPL-CCNC: 102 U/L — SIGNIFICANT CHANGE UP (ref 40–120)
ALT FLD-CCNC: 10 U/L — SIGNIFICANT CHANGE UP (ref 10–45)
ANION GAP SERPL CALC-SCNC: 11 MMOL/L — SIGNIFICANT CHANGE UP (ref 5–17)
AST SERPL-CCNC: 14 U/L — SIGNIFICANT CHANGE UP (ref 10–40)
BASOPHILS # BLD AUTO: 0.13 K/UL — SIGNIFICANT CHANGE UP (ref 0–0.2)
BASOPHILS NFR BLD AUTO: 1.1 % — SIGNIFICANT CHANGE UP (ref 0–2)
BILIRUB SERPL-MCNC: 0.4 MG/DL — SIGNIFICANT CHANGE UP (ref 0.2–1.2)
BUN SERPL-MCNC: 22 MG/DL — SIGNIFICANT CHANGE UP (ref 7–23)
CALCIUM SERPL-MCNC: 9.6 MG/DL — SIGNIFICANT CHANGE UP (ref 8.4–10.5)
CHLORIDE SERPL-SCNC: 104 MMOL/L — SIGNIFICANT CHANGE UP (ref 96–108)
CO2 SERPL-SCNC: 26 MMOL/L — SIGNIFICANT CHANGE UP (ref 22–31)
CREAT SERPL-MCNC: 0.81 MG/DL — SIGNIFICANT CHANGE UP (ref 0.5–1.3)
EGFR: 89 ML/MIN/1.73M2 — SIGNIFICANT CHANGE UP
EOSINOPHIL # BLD AUTO: 1.9 K/UL — HIGH (ref 0–0.5)
EOSINOPHIL NFR BLD AUTO: 16.5 % — HIGH (ref 0–6)
GLUCOSE SERPL-MCNC: 109 MG/DL — HIGH (ref 70–99)
HCT VFR BLD CALC: 37.7 % — LOW (ref 39–50)
HGB BLD-MCNC: 12.1 G/DL — LOW (ref 13–17)
IMM GRANULOCYTES NFR BLD AUTO: 0.9 % — SIGNIFICANT CHANGE UP (ref 0–0.9)
LYMPHOCYTES # BLD AUTO: 3.58 K/UL — HIGH (ref 1–3.3)
LYMPHOCYTES # BLD AUTO: 31 % — SIGNIFICANT CHANGE UP (ref 13–44)
MCHC RBC-ENTMCNC: 28.7 PG — SIGNIFICANT CHANGE UP (ref 27–34)
MCHC RBC-ENTMCNC: 32.1 GM/DL — SIGNIFICANT CHANGE UP (ref 32–36)
MCV RBC AUTO: 89.3 FL — SIGNIFICANT CHANGE UP (ref 80–100)
MONOCYTES # BLD AUTO: 1.19 K/UL — HIGH (ref 0–0.9)
MONOCYTES NFR BLD AUTO: 10.3 % — SIGNIFICANT CHANGE UP (ref 2–14)
NEUTROPHILS # BLD AUTO: 4.64 K/UL — SIGNIFICANT CHANGE UP (ref 1.8–7.4)
NEUTROPHILS NFR BLD AUTO: 40.2 % — LOW (ref 43–77)
NRBC # BLD: 0 /100 WBCS — SIGNIFICANT CHANGE UP (ref 0–0)
PLATELET # BLD AUTO: 229 K/UL — SIGNIFICANT CHANGE UP (ref 150–400)
POTASSIUM SERPL-MCNC: 5.2 MMOL/L — SIGNIFICANT CHANGE UP (ref 3.5–5.3)
POTASSIUM SERPL-SCNC: 5.2 MMOL/L — SIGNIFICANT CHANGE UP (ref 3.5–5.3)
PROT SERPL-MCNC: 6.8 G/DL — SIGNIFICANT CHANGE UP (ref 6–8.3)
RBC # BLD: 4.22 M/UL — SIGNIFICANT CHANGE UP (ref 4.2–5.8)
RBC # FLD: 14.7 % — HIGH (ref 10.3–14.5)
SODIUM SERPL-SCNC: 140 MMOL/L — SIGNIFICANT CHANGE UP (ref 135–145)
WBC # BLD: 11.54 K/UL — HIGH (ref 3.8–10.5)
WBC # FLD AUTO: 11.54 K/UL — HIGH (ref 3.8–10.5)

## 2023-04-20 ENCOUNTER — APPOINTMENT (OUTPATIENT)
Dept: INFUSION THERAPY | Facility: CLINIC | Age: 83
End: 2023-04-20

## 2023-04-20 VITALS
TEMPERATURE: 97.7 F | DIASTOLIC BLOOD PRESSURE: 53 MMHG | RESPIRATION RATE: 18 BRPM | SYSTOLIC BLOOD PRESSURE: 92 MMHG | HEART RATE: 84 BPM | HEIGHT: 60 IN | BODY MASS INDEX: 19.09 KG/M2 | WEIGHT: 97.25 LBS | OXYGEN SATURATION: 94 %

## 2023-04-21 DIAGNOSIS — Z51.89 ENCOUNTER FOR OTHER SPECIFIED AFTERCARE: ICD-10-CM

## 2023-04-21 DIAGNOSIS — Z51.11 ENCOUNTER FOR ANTINEOPLASTIC CHEMOTHERAPY: ICD-10-CM

## 2023-04-21 DIAGNOSIS — R11.2 NAUSEA WITH VOMITING, UNSPECIFIED: ICD-10-CM

## 2023-05-04 ENCOUNTER — RESULT REVIEW (OUTPATIENT)
Age: 83
End: 2023-05-04

## 2023-05-04 ENCOUNTER — APPOINTMENT (OUTPATIENT)
Dept: HEMATOLOGY ONCOLOGY | Facility: CLINIC | Age: 83
End: 2023-05-04
Payer: MEDICARE

## 2023-05-04 ENCOUNTER — NON-APPOINTMENT (OUTPATIENT)
Age: 83
End: 2023-05-04

## 2023-05-04 VITALS
OXYGEN SATURATION: 95 % | WEIGHT: 90.19 LBS | DIASTOLIC BLOOD PRESSURE: 41 MMHG | SYSTOLIC BLOOD PRESSURE: 77 MMHG | HEART RATE: 80 BPM | HEIGHT: 60 IN | TEMPERATURE: 97.3 F | RESPIRATION RATE: 18 BRPM | BODY MASS INDEX: 17.71 KG/M2

## 2023-05-04 DIAGNOSIS — I95.9 HYPOTENSION, UNSPECIFIED: ICD-10-CM

## 2023-05-04 DIAGNOSIS — N20.0 CALCULUS OF KIDNEY: ICD-10-CM

## 2023-05-04 DIAGNOSIS — R63.4 ABNORMAL WEIGHT LOSS: ICD-10-CM

## 2023-05-04 DIAGNOSIS — R82.998 OTHER ABNORMAL FINDINGS IN URINE: ICD-10-CM

## 2023-05-04 DIAGNOSIS — Z51.12 ENCOUNTER FOR ANTINEOPLASTIC IMMUNOTHERAPY: ICD-10-CM

## 2023-05-04 DIAGNOSIS — R76.8 OTHER SPECIFIED ABNORMAL IMMUNOLOGICAL FINDINGS IN SERUM: ICD-10-CM

## 2023-05-04 DIAGNOSIS — Z87.448 PERSONAL HISTORY OF OTHER DISEASES OF URINARY SYSTEM: ICD-10-CM

## 2023-05-04 LAB
ALBUMIN SERPL ELPH-MCNC: 3.8 G/DL — SIGNIFICANT CHANGE UP (ref 3.3–5)
ALP SERPL-CCNC: 122 U/L — HIGH (ref 40–120)
ALT FLD-CCNC: 10 U/L — SIGNIFICANT CHANGE UP (ref 10–45)
ANION GAP SERPL CALC-SCNC: 13 MMOL/L — SIGNIFICANT CHANGE UP (ref 5–17)
AST SERPL-CCNC: 11 U/L — SIGNIFICANT CHANGE UP (ref 10–40)
BASOPHILS # BLD AUTO: 0.16 K/UL — SIGNIFICANT CHANGE UP (ref 0–0.2)
BASOPHILS NFR BLD AUTO: 0.8 % — SIGNIFICANT CHANGE UP (ref 0–2)
BILIRUB SERPL-MCNC: 0.4 MG/DL — SIGNIFICANT CHANGE UP (ref 0.2–1.2)
BUN SERPL-MCNC: 23 MG/DL — SIGNIFICANT CHANGE UP (ref 7–23)
CALCIUM SERPL-MCNC: 9.1 MG/DL — SIGNIFICANT CHANGE UP (ref 8.4–10.5)
CHLORIDE SERPL-SCNC: 103 MMOL/L — SIGNIFICANT CHANGE UP (ref 96–108)
CO2 SERPL-SCNC: 21 MMOL/L — LOW (ref 22–31)
CREAT SERPL-MCNC: 0.75 MG/DL — SIGNIFICANT CHANGE UP (ref 0.5–1.3)
EGFR: 91 ML/MIN/1.73M2 — SIGNIFICANT CHANGE UP
EOSINOPHIL # BLD AUTO: 1.63 K/UL — HIGH (ref 0–0.5)
EOSINOPHIL NFR BLD AUTO: 8.1 % — HIGH (ref 0–6)
GLUCOSE SERPL-MCNC: 152 MG/DL — HIGH (ref 70–99)
HCT VFR BLD CALC: 37.7 % — LOW (ref 39–50)
HGB BLD-MCNC: 12.3 G/DL — LOW (ref 13–17)
IMM GRANULOCYTES NFR BLD AUTO: 2.4 % — HIGH (ref 0–0.9)
LDH SERPL L TO P-CCNC: 174 U/L — SIGNIFICANT CHANGE UP (ref 50–242)
LYMPHOCYTES # BLD AUTO: 12.8 % — LOW (ref 13–44)
LYMPHOCYTES # BLD AUTO: 2.59 K/UL — SIGNIFICANT CHANGE UP (ref 1–3.3)
MCHC RBC-ENTMCNC: 29 PG — SIGNIFICANT CHANGE UP (ref 27–34)
MCHC RBC-ENTMCNC: 32.6 GM/DL — SIGNIFICANT CHANGE UP (ref 32–36)
MCV RBC AUTO: 88.9 FL — SIGNIFICANT CHANGE UP (ref 80–100)
MONOCYTES # BLD AUTO: 1.12 K/UL — HIGH (ref 0–0.9)
MONOCYTES NFR BLD AUTO: 5.5 % — SIGNIFICANT CHANGE UP (ref 2–14)
NEUTROPHILS # BLD AUTO: 14.24 K/UL — HIGH (ref 1.8–7.4)
NEUTROPHILS NFR BLD AUTO: 70.4 % — SIGNIFICANT CHANGE UP (ref 43–77)
NRBC # BLD: 0 /100 WBCS — SIGNIFICANT CHANGE UP (ref 0–0)
PLATELET # BLD AUTO: 280 K/UL — SIGNIFICANT CHANGE UP (ref 150–400)
POTASSIUM SERPL-MCNC: 4.1 MMOL/L — SIGNIFICANT CHANGE UP (ref 3.5–5.3)
POTASSIUM SERPL-SCNC: 4.1 MMOL/L — SIGNIFICANT CHANGE UP (ref 3.5–5.3)
PROT SERPL-MCNC: 6.4 G/DL — SIGNIFICANT CHANGE UP (ref 6–8.3)
RBC # BLD: 4.24 M/UL — SIGNIFICANT CHANGE UP (ref 4.2–5.8)
RBC # FLD: 15.1 % — HIGH (ref 10.3–14.5)
SODIUM SERPL-SCNC: 137 MMOL/L — SIGNIFICANT CHANGE UP (ref 135–145)
WBC # BLD: 20.23 K/UL — HIGH (ref 3.8–10.5)
WBC # FLD AUTO: 20.23 K/UL — HIGH (ref 3.8–10.5)

## 2023-05-04 PROCEDURE — 99214 OFFICE O/P EST MOD 30 MIN: CPT

## 2023-05-04 NOTE — ASSESSMENT
[FreeTextEntry1] : Patient is an 83 y.o. with a  follicular lymphoma grade 1, at least stage 3. Diagnosed in 2022.\par Presented with moderately bulky disease; symptomatic with weight  loss, fatigue, and SOB (pleural effusion). \par \par Initially opted for immunotherapy only.\par 12/14/22 - S/p Rituxan x 4 -  No response on follow up PET scan. \par \par 4/19/23 - Started 2L R- Bendamustine (dose reduced at 70mg/m2) with growth factor support. \par Clinically has responded well with near resolution of lymphadenopathy. \par Remains fatigued but this is to be expected in the 1st 4 - 6 weeks. \par  \par 1. Onc - C#2 due 5/17/23.  C#3 6/14/23.  Since tolerated well will cancel D15 PE and will see patient prior to C#3.\par Plan was for 4 - 6 cycles.  Will also lower dose of Benadryl on day of chemo as it was too sedating for him. \par 2. Hep B Core positive - No known hx of hepatitis. Continue on Entecavir 0.5 mg daily. \par Also continue acyclovir prophylaxis.\par 3. Hypotension - Patient on Metoprolol.  Has lost a lot of weight - now hypotensive and occasional dizziness.  Advised to call PCP to discuss lowering dose. \par 4. Weight loss - If this regime is working hopefully in ~ 2 - 3 weeks expect cachexia to reverse and patient should be able to start gaining some weight. \par \par PCP TARIQ JAMES \par Dr. Flaco Baptiste\par \par  .\par

## 2023-05-04 NOTE — REASON FOR VISIT
[Follow-Up Visit] : a follow-up visit for [Other: _____] : [unfilled] [FreeTextEntry2] : Follicular lymphoma - D15 C1 R-Treanda

## 2023-05-04 NOTE — HISTORY OF PRESENT ILLNESS
[de-identified] : LOBO RO is an 82 y.o. M with a PMH significant for HTN, HLD, current  1/2 PPD smoker x 65 years, and BPH, who we are following for a follicular lymphoma. \par \par 10/11/22 - Patient saw provider at [facility] with c/o 20 lbs  unintentional weight loss over past 2 months, no appetite, increased fatigue, and lymphadenopathy.  \par 10/8/22 - Has Abd US to evaluate of AAA - no evidence of AAA but showed extensive retroperitoneal adenopathy and splenomegaly.  \par 10/8/22 - CT Chest (?Lung cancer screening) -  Bulky multi station thoracoabdominal lymphadenopathy with splenomegaly, concerning for lymphoma.  Peripheral splenic hypodensity may represent an infarct or mass. Small right and moderate left pleural effusions with underlying pleural thickening on the left; consider cytologic correlation, as lymphomatous involvement is possible.\par \par 10/18/22 - CT A/P - Diffuse bulky para-aortic, periportal, and mesenteric lymphadenopathy. Bilateral bulky inguinal and iliac chain lymphadenopathy. For reference:\par * Pleural LN measures 5.4 x 4.5 cm.\par * Right inguinal LN measures 3.6 x 1.8 cm.\par * Confluent RPLAD surrounding the aorta measures 9.3 x 6.2 cm.\par \par 10/21/22 - PET/CT -  Large  and markedly avid conglomerate adenopathy in the neck, chest, abdomen, and pelvis ( SUV ranges 3-5) . Large loculated  left pleural effusion and small R pleural effusion.   Hypermetabolic 1 cm cutaneous lesion in the right chest wall. Recommend clinical inspection as neoplasm could have this appearance.\par \par 10/20/22 - Labs with elevated WBC 15K, Peripheral blood flow cytometry - Monotypic B-cells (10%) positive for Kappa, CD19, CD20, CD10, negative for CD5, , CD23. \par C/o fatigue.  Weight loss as above. No pain. No fevers. No night sweats. \par \par 11/4/22 -R axillary  LN biopsy - follicular lymphoma grade 1. \par 11/4/22 - L thoracentesis \par \par 11/30/22- 12/14/22 - Rituxan weekly x 4  \par Hep B core positive. Started on Entecavir antiviral prophylaxis\par \par 1/14/23 - PET/CT (a/w 10/2022) -  no response - bulky FERNANDO and L pleural effusion no change. Evidence of splenic and small bowel involvement. \par \par 4/19/23 -  Started 2L therapy with R-Treanda. \par   [de-identified] : Here with his son.\par They reported patient felt great on D2 and also that his LN's shrunk immediately.  \par Energy was short lived - fatigued now.  \par Does have some occasional dizziness.\par Hungry - frustrated that still can't gain weight.   \par Else denies any changes in his family, medical, or social history since his last visit of 3/17/23.

## 2023-05-04 NOTE — PHYSICAL EXAM
[Restricted in physically strenuous activity but ambulatory and able to carry out work of a light or sedentary nature] : Status 1- Restricted in physically strenuous activity but ambulatory and able to carry out work of a light or sedentary nature, e.g., light house work, office work [Thin] : thin [Normal] : affect appropriate [de-identified] : LN's markedly decreased.  Small SCLN's B/L [de-identified] : no C/C/E [de-identified] : small axillary LAD [de-identified] : dry skin, no rashes

## 2023-05-04 NOTE — REVIEW OF SYSTEMS
[Patient Intake Form Reviewed] : Patient intake form was reviewed [Fatigue] : fatigue [Dizziness] : dizziness [Negative] : Constitutional [FreeTextEntry8] : frequency - seeing urology for this  [de-identified] : dry skin [de-identified] : lymphadenopathy dramatically decreased

## 2023-05-04 NOTE — RESULTS/DATA
[FreeTextEntry1] : WBC: 20.23,  ANC: 14.24,   Hgb: 12.3,  Hct: 37.7,  MCV: 88.9,  Plts: 280\par CMP: pending

## 2023-05-11 ENCOUNTER — OUTPATIENT (OUTPATIENT)
Dept: OUTPATIENT SERVICES | Facility: HOSPITAL | Age: 83
LOS: 1 days | Discharge: HOME | End: 2023-05-11

## 2023-05-11 DIAGNOSIS — Z98.890 OTHER SPECIFIED POSTPROCEDURAL STATES: Chronic | ICD-10-CM

## 2023-05-11 DIAGNOSIS — C82.00 FOLLICULAR LYMPHOMA GRADE I, UNSPECIFIED SITE: ICD-10-CM

## 2023-05-17 ENCOUNTER — RESULT REVIEW (OUTPATIENT)
Age: 83
End: 2023-05-17

## 2023-05-17 ENCOUNTER — APPOINTMENT (OUTPATIENT)
Dept: INFUSION THERAPY | Facility: CLINIC | Age: 83
End: 2023-05-17

## 2023-05-17 VITALS
BODY MASS INDEX: 18.86 KG/M2 | TEMPERATURE: 97.9 F | SYSTOLIC BLOOD PRESSURE: 92 MMHG | WEIGHT: 93.38 LBS | RESPIRATION RATE: 18 BRPM | DIASTOLIC BLOOD PRESSURE: 53 MMHG | OXYGEN SATURATION: 96 % | HEART RATE: 83 BPM

## 2023-05-17 LAB
ALBUMIN SERPL ELPH-MCNC: 3.9 G/DL — SIGNIFICANT CHANGE UP (ref 3.3–5)
ALP SERPL-CCNC: 122 U/L — HIGH (ref 40–120)
ALT FLD-CCNC: 11 U/L — SIGNIFICANT CHANGE UP (ref 10–45)
ANION GAP SERPL CALC-SCNC: 11 MMOL/L — SIGNIFICANT CHANGE UP (ref 5–17)
ANISOCYTOSIS BLD QL: SLIGHT — SIGNIFICANT CHANGE UP
AST SERPL-CCNC: 12 U/L — SIGNIFICANT CHANGE UP (ref 10–40)
BASOPHILS # BLD AUTO: 0.11 K/UL — SIGNIFICANT CHANGE UP (ref 0–0.2)
BASOPHILS NFR BLD AUTO: 1 % — SIGNIFICANT CHANGE UP (ref 0–2)
BILIRUB SERPL-MCNC: 0.3 MG/DL — SIGNIFICANT CHANGE UP (ref 0.2–1.2)
BUN SERPL-MCNC: 22 MG/DL — SIGNIFICANT CHANGE UP (ref 7–23)
CALCIUM SERPL-MCNC: 9.3 MG/DL — SIGNIFICANT CHANGE UP (ref 8.4–10.5)
CHLORIDE SERPL-SCNC: 106 MMOL/L — SIGNIFICANT CHANGE UP (ref 96–108)
CO2 SERPL-SCNC: 26 MMOL/L — SIGNIFICANT CHANGE UP (ref 22–31)
CREAT SERPL-MCNC: 0.79 MG/DL — SIGNIFICANT CHANGE UP (ref 0.5–1.3)
EGFR: 89 ML/MIN/1.73M2 — SIGNIFICANT CHANGE UP
EOSINOPHIL # BLD AUTO: 3.78 K/UL — HIGH (ref 0–0.5)
EOSINOPHIL NFR BLD AUTO: 33 % — HIGH (ref 0–6)
GLUCOSE SERPL-MCNC: 119 MG/DL — HIGH (ref 70–99)
HCT VFR BLD CALC: 37.5 % — LOW (ref 39–50)
HGB BLD-MCNC: 12.4 G/DL — LOW (ref 13–17)
LG PLATELETS BLD QL AUTO: SLIGHT — SIGNIFICANT CHANGE UP
LYMPHOCYTES # BLD AUTO: 1.72 K/UL — SIGNIFICANT CHANGE UP (ref 1–3.3)
LYMPHOCYTES # BLD AUTO: 15 % — SIGNIFICANT CHANGE UP (ref 13–44)
MCHC RBC-ENTMCNC: 29.5 PG — SIGNIFICANT CHANGE UP (ref 27–34)
MCHC RBC-ENTMCNC: 33.1 GM/DL — SIGNIFICANT CHANGE UP (ref 32–36)
MCV RBC AUTO: 89.3 FL — SIGNIFICANT CHANGE UP (ref 80–100)
MONOCYTES # BLD AUTO: 0.92 K/UL — HIGH (ref 0–0.9)
MONOCYTES NFR BLD AUTO: 8 % — SIGNIFICANT CHANGE UP (ref 2–14)
NEUTROPHILS # BLD AUTO: 4.58 K/UL — SIGNIFICANT CHANGE UP (ref 1.8–7.4)
NEUTROPHILS NFR BLD AUTO: 39 % — LOW (ref 43–77)
NEUTS BAND # BLD: 1 % — SIGNIFICANT CHANGE UP (ref 0–8)
NRBC # BLD: 0 /100 — SIGNIFICANT CHANGE UP (ref 0–0)
NRBC # BLD: SIGNIFICANT CHANGE UP /100 WBCS (ref 0–0)
OVALOCYTES BLD QL SMEAR: SLIGHT — SIGNIFICANT CHANGE UP
PLAT MORPH BLD: NORMAL — SIGNIFICANT CHANGE UP
PLATELET # BLD AUTO: 213 K/UL — SIGNIFICANT CHANGE UP (ref 150–400)
POIKILOCYTOSIS BLD QL AUTO: SLIGHT — SIGNIFICANT CHANGE UP
POTASSIUM SERPL-MCNC: 4.3 MMOL/L — SIGNIFICANT CHANGE UP (ref 3.5–5.3)
POTASSIUM SERPL-SCNC: 4.3 MMOL/L — SIGNIFICANT CHANGE UP (ref 3.5–5.3)
PROT SERPL-MCNC: 6.8 G/DL — SIGNIFICANT CHANGE UP (ref 6–8.3)
RBC # BLD: 4.2 M/UL — SIGNIFICANT CHANGE UP (ref 4.2–5.8)
RBC # FLD: 14.7 % — HIGH (ref 10.3–14.5)
RBC BLD AUTO: SIGNIFICANT CHANGE UP
SODIUM SERPL-SCNC: 142 MMOL/L — SIGNIFICANT CHANGE UP (ref 135–145)
STOMATOCYTES BLD QL SMEAR: SLIGHT — SIGNIFICANT CHANGE UP
VARIANT LYMPHS # BLD: 3 % — SIGNIFICANT CHANGE UP (ref 0–6)
WBC # BLD: 11.45 K/UL — HIGH (ref 3.8–10.5)
WBC # FLD AUTO: 11.45 K/UL — HIGH (ref 3.8–10.5)

## 2023-05-18 ENCOUNTER — APPOINTMENT (OUTPATIENT)
Dept: INFUSION THERAPY | Facility: CLINIC | Age: 83
End: 2023-05-18

## 2023-05-18 DIAGNOSIS — Z51.89 ENCOUNTER FOR OTHER SPECIFIED AFTERCARE: ICD-10-CM

## 2023-05-18 DIAGNOSIS — Z51.11 ENCOUNTER FOR ANTINEOPLASTIC CHEMOTHERAPY: ICD-10-CM

## 2023-05-18 DIAGNOSIS — R11.2 NAUSEA WITH VOMITING, UNSPECIFIED: ICD-10-CM

## 2023-06-14 ENCOUNTER — RESULT REVIEW (OUTPATIENT)
Age: 83
End: 2023-06-14

## 2023-06-14 ENCOUNTER — APPOINTMENT (OUTPATIENT)
Dept: HEMATOLOGY ONCOLOGY | Facility: CLINIC | Age: 83
End: 2023-06-14

## 2023-06-14 ENCOUNTER — APPOINTMENT (OUTPATIENT)
Dept: HEMATOLOGY ONCOLOGY | Facility: CLINIC | Age: 83
End: 2023-06-14
Payer: MEDICARE

## 2023-06-14 ENCOUNTER — APPOINTMENT (OUTPATIENT)
Dept: INFUSION THERAPY | Facility: CLINIC | Age: 83
End: 2023-06-14
Payer: MEDICARE

## 2023-06-14 VITALS
OXYGEN SATURATION: 98 % | HEART RATE: 83 BPM | SYSTOLIC BLOOD PRESSURE: 95 MMHG | HEIGHT: 60 IN | TEMPERATURE: 97.3 F | DIASTOLIC BLOOD PRESSURE: 65 MMHG | BODY MASS INDEX: 18.16 KG/M2 | WEIGHT: 92.5 LBS | RESPIRATION RATE: 17 BRPM

## 2023-06-14 LAB
ALBUMIN SERPL ELPH-MCNC: 3.9 G/DL — SIGNIFICANT CHANGE UP (ref 3.3–5)
ALP SERPL-CCNC: 116 U/L — SIGNIFICANT CHANGE UP (ref 40–120)
ALT FLD-CCNC: 11 U/L — SIGNIFICANT CHANGE UP (ref 10–45)
ANION GAP SERPL CALC-SCNC: 12 MMOL/L — SIGNIFICANT CHANGE UP (ref 5–17)
AST SERPL-CCNC: 16 U/L — SIGNIFICANT CHANGE UP (ref 10–40)
BASOPHILS # BLD AUTO: 0.16 K/UL — SIGNIFICANT CHANGE UP (ref 0–0.2)
BASOPHILS NFR BLD AUTO: 1.1 % — SIGNIFICANT CHANGE UP (ref 0–2)
BILIRUB SERPL-MCNC: 0.4 MG/DL — SIGNIFICANT CHANGE UP (ref 0.2–1.2)
BUN SERPL-MCNC: 19 MG/DL — SIGNIFICANT CHANGE UP (ref 7–23)
CALCIUM SERPL-MCNC: 9.2 MG/DL — SIGNIFICANT CHANGE UP (ref 8.4–10.5)
CHLORIDE SERPL-SCNC: 104 MMOL/L — SIGNIFICANT CHANGE UP (ref 96–108)
CO2 SERPL-SCNC: 24 MMOL/L — SIGNIFICANT CHANGE UP (ref 22–31)
CREAT SERPL-MCNC: 0.76 MG/DL — SIGNIFICANT CHANGE UP (ref 0.5–1.3)
EGFR: 90 ML/MIN/1.73M2 — SIGNIFICANT CHANGE UP
EOSINOPHIL # BLD AUTO: 3.37 K/UL — HIGH (ref 0–0.5)
EOSINOPHIL NFR BLD AUTO: 22.3 % — HIGH (ref 0–6)
GLUCOSE SERPL-MCNC: 99 MG/DL — SIGNIFICANT CHANGE UP (ref 70–99)
HCT VFR BLD CALC: 37.2 % — LOW (ref 39–50)
HGB BLD-MCNC: 12.5 G/DL — LOW (ref 13–17)
IMM GRANULOCYTES NFR BLD AUTO: 0.7 % — SIGNIFICANT CHANGE UP (ref 0–0.9)
LYMPHOCYTES # BLD AUTO: 1.3 K/UL — SIGNIFICANT CHANGE UP (ref 1–3.3)
LYMPHOCYTES # BLD AUTO: 8.6 % — LOW (ref 13–44)
MCHC RBC-ENTMCNC: 29.6 PG — SIGNIFICANT CHANGE UP (ref 27–34)
MCHC RBC-ENTMCNC: 33.6 GM/DL — SIGNIFICANT CHANGE UP (ref 32–36)
MCV RBC AUTO: 87.9 FL — SIGNIFICANT CHANGE UP (ref 80–100)
MONOCYTES # BLD AUTO: 1.39 K/UL — HIGH (ref 0–0.9)
MONOCYTES NFR BLD AUTO: 9.2 % — SIGNIFICANT CHANGE UP (ref 2–14)
NEUTROPHILS # BLD AUTO: 8.77 K/UL — HIGH (ref 1.8–7.4)
NEUTROPHILS NFR BLD AUTO: 58.1 % — SIGNIFICANT CHANGE UP (ref 43–77)
NRBC # BLD: 0 /100 WBCS — SIGNIFICANT CHANGE UP (ref 0–0)
PLATELET # BLD AUTO: 286 K/UL — SIGNIFICANT CHANGE UP (ref 150–400)
POTASSIUM SERPL-MCNC: 4.8 MMOL/L — SIGNIFICANT CHANGE UP (ref 3.5–5.3)
POTASSIUM SERPL-SCNC: 4.8 MMOL/L — SIGNIFICANT CHANGE UP (ref 3.5–5.3)
PROT SERPL-MCNC: 6.7 G/DL — SIGNIFICANT CHANGE UP (ref 6–8.3)
RBC # BLD: 4.23 M/UL — SIGNIFICANT CHANGE UP (ref 4.2–5.8)
RBC # FLD: 14.6 % — HIGH (ref 10.3–14.5)
SODIUM SERPL-SCNC: 140 MMOL/L — SIGNIFICANT CHANGE UP (ref 135–145)
WBC # BLD: 15.09 K/UL — HIGH (ref 3.8–10.5)
WBC # FLD AUTO: 15.09 K/UL — HIGH (ref 3.8–10.5)

## 2023-06-14 PROCEDURE — 99214 OFFICE O/P EST MOD 30 MIN: CPT

## 2023-06-14 NOTE — PHYSICAL EXAM
[Restricted in physically strenuous activity but ambulatory and able to carry out work of a light or sedentary nature] : Status 1- Restricted in physically strenuous activity but ambulatory and able to carry out work of a light or sedentary nature, e.g., light house work, office work [Thin] : thin [Normal] : affect appropriate [de-identified] : his baseline  [de-identified] : cervical adenopathy not palpable   [de-identified] : slightly diminished L base  [de-identified] : regular with systolic murmur  [de-identified] : no pedal edema  [de-identified] : adenopathy- neck axilla inguinal much better barely palpable

## 2023-06-14 NOTE — REVIEW OF SYSTEMS
[Patient Intake Form Reviewed] : Patient intake form was reviewed [Fatigue] : no fatigue [Recent Change In Weight] : ~T recent weight change [Cough] : no cough [Negative] : Respiratory [FreeTextEntry2] : per HPI  [FreeTextEntry8] : frequency- seeing urology for this

## 2023-06-14 NOTE — REASON FOR VISIT
[Follow-Up Visit] : a follow-up visit for [Family Member] : family member [FreeTextEntry2] : follicular lymphoma on bendamustine/ rituxan

## 2023-06-14 NOTE — HISTORY OF PRESENT ILLNESS
[de-identified] : LOBO RO is an 82 y.o. M with a PMH significant for HTN, HLD, current  1/2 PPD smoker x 65 years, and BPH, who has been referred to our office for an evaluation of an newly diagnosed lymphoma.\par \par 10/11/22 - Patient saw provider at [facility] with c/o 20 lbS  unintentional weight loss over past 2 months, no appetite, increased fatigue, and lymphadenopathy.  \par 10/8/22 - Has Abd US to evaluate of AAA - no evidence of AAA but showed extensive retroperitoneal adenopathy and splenomegaly.  Was sent for CT scans. \par 10/8/22 - CT Chest (?Lung cancer screening) -  Bulky multi station thoracoabdominal lymphadenopathy with splenomegaly, concerning for lymphoma.  Peripheral splenic hypodensity may represent an infarct or mass. Small right and moderate left pleural effusions with underlying pleural thickening on the left; consider cytologic correlation, as lymphomatous involvement is possible.\par \par 10/18/22 - CT A/P - Diffuse bulky para-aortic, periportal, and mesenteric lymphadenopathy. Bilateral bulky inguinal and iliac chain lymphadenopathy. For reference:\par * Pleural LN measures 5.4 x 4.5 cm.\par * Right inguinal LN measures 3.6 x 1.8 cm.\par * Confluent RPLAD surrounding the aorta measures 9.3 x 6.2 cm.\par \par 10/21/22 - PET/CT -  Large  and markedly avid conglomerate adenopathy in the neck, chest, abdomen, and pelvis ( SUV ranges 3-5) . Large loculated  left pleural effusion and small R pleural effusion.   Hypermetabolic 1 cm cutaneous lesion in the right chest wall. Recommend clinical inspection as neoplasm could have this appearance.\par \par 10/20/22 - Labs with elevated WBC 15K, Peripheral blood flow cytometry: Monotypic B-cells (10%) positive for Kappa, CD19, CD20, CD10, negative for CD5, , CD23. \par \par C/o fatigue. Weight  loss as above . No pain. No fevers . No night sweats. \par \par 11/4/22 R axillary  LN biopsy :  follicular lymphoma grade 1. \par L thoracentesis 11/4/22 \par \par Rituxan weekly x 4  11/30/22- 12/14/22 \par \par Hep B core positive. started on Entecavir antiviral prophylaxis\par \par PET 1/14/23 : compared to Oct 2022- no response - bulky FERNANDO and L pleural effusion no change. Evidence of splenic and small bowel involvement. \par \par 4/19/23 Started bendamustine ( reduced dose )/ rituxan  \par \par Immediate clinical response- palpable adenopathy resolving.  [de-identified] : Today c 3 d 1 of bendamustine and rituxan \par Tolerating chemo very well. Only issue- not able to gain weight . he is able to eat regular  as before, has appetite but weight is even down few lbs. Did not try any nutritional supplements

## 2023-06-14 NOTE — ASSESSMENT
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 Diagnosed in 2022\par Indications for treatment: symptoms ( weight  loss, fatigue).  Moderately bulky disease.\par \par Initially opted for immunotherapy only.\par \par S/p Rituxan x 4 completed 12/14/22. No response on follow up PET scan.\par \par Started bendamustine  at reduced dose / Rituxan on  4/19/23.\par  Bendamustine 70 mg/m2 ( dose reduced) D1 and D2  and Rituxan 375 mg/m2 D1  every 28 days x up to 4- 6 cycles.\par \par Clinically responding very well. Tolerating well.\par Continue max 4-6 cycles. PET scan after cycle 4.\par \par Concern re ongoing weight loss. Has no GI c/o. Questionable if he is getting adequate caloric intake on his current diet. Will try Ensure or Boost.\par Referred to dietician.\par \par He is Hep B core positive. No known hx of hepatitis.\par Continue antiviral prophylaxis.\par On  Entecavir 0.5 mg daily\par \par Exam with cycle 5. \par \par D/w patient and his son. \par

## 2023-06-15 ENCOUNTER — APPOINTMENT (OUTPATIENT)
Dept: INFUSION THERAPY | Facility: CLINIC | Age: 83
End: 2023-06-15

## 2023-06-15 VITALS
BODY MASS INDEX: 18.63 KG/M2 | SYSTOLIC BLOOD PRESSURE: 103 MMHG | RESPIRATION RATE: 17 BRPM | TEMPERATURE: 97.4 F | WEIGHT: 92.25 LBS | DIASTOLIC BLOOD PRESSURE: 69 MMHG | HEART RATE: 81 BPM | OXYGEN SATURATION: 99 %

## 2023-06-15 DIAGNOSIS — Z79.899 OTHER LONG TERM (CURRENT) DRUG THERAPY: ICD-10-CM

## 2023-06-16 ENCOUNTER — NON-APPOINTMENT (OUTPATIENT)
Age: 83
End: 2023-06-16

## 2023-06-30 ENCOUNTER — APPOINTMENT (OUTPATIENT)
Dept: HEMATOLOGY ONCOLOGY | Facility: CLINIC | Age: 83
End: 2023-06-30

## 2023-07-05 ENCOUNTER — APPOINTMENT (OUTPATIENT)
Dept: UROLOGY | Facility: CLINIC | Age: 83
End: 2023-07-05

## 2023-07-06 ENCOUNTER — OUTPATIENT (OUTPATIENT)
Dept: OUTPATIENT SERVICES | Facility: HOSPITAL | Age: 83
LOS: 1 days | Discharge: HOME | End: 2023-07-06

## 2023-07-06 DIAGNOSIS — Z98.890 OTHER SPECIFIED POSTPROCEDURAL STATES: Chronic | ICD-10-CM

## 2023-07-06 DIAGNOSIS — C82.00 FOLLICULAR LYMPHOMA GRADE I, UNSPECIFIED SITE: ICD-10-CM

## 2023-07-12 ENCOUNTER — APPOINTMENT (OUTPATIENT)
Dept: INFUSION THERAPY | Facility: CLINIC | Age: 83
End: 2023-07-12
Payer: MEDICARE

## 2023-07-12 ENCOUNTER — RESULT REVIEW (OUTPATIENT)
Age: 83
End: 2023-07-12

## 2023-07-12 ENCOUNTER — APPOINTMENT (OUTPATIENT)
Dept: HEMATOLOGY ONCOLOGY | Facility: CLINIC | Age: 83
End: 2023-07-12
Payer: MEDICARE

## 2023-07-12 VITALS
TEMPERATURE: 97.4 F | HEART RATE: 112 BPM | HEIGHT: 60 IN | OXYGEN SATURATION: 99 % | BODY MASS INDEX: 17.39 KG/M2 | RESPIRATION RATE: 18 BRPM | SYSTOLIC BLOOD PRESSURE: 112 MMHG | DIASTOLIC BLOOD PRESSURE: 77 MMHG | WEIGHT: 88.56 LBS

## 2023-07-12 LAB
BASOPHILS # BLD AUTO: 0.17 K/UL — SIGNIFICANT CHANGE UP (ref 0–0.2)
BASOPHILS NFR BLD AUTO: 0.9 % — SIGNIFICANT CHANGE UP (ref 0–2)
EOSINOPHIL # BLD AUTO: 4.52 K/UL — HIGH (ref 0–0.5)
EOSINOPHIL NFR BLD AUTO: 24.3 % — HIGH (ref 0–6)
HCT VFR BLD CALC: 37.6 % — LOW (ref 39–50)
HGB BLD-MCNC: 12.6 G/DL — LOW (ref 13–17)
IMM GRANULOCYTES NFR BLD AUTO: 1.3 % — HIGH (ref 0–0.9)
LYMPHOCYTES # BLD AUTO: 20.4 % — SIGNIFICANT CHANGE UP (ref 13–44)
LYMPHOCYTES # BLD AUTO: 3.8 K/UL — HIGH (ref 1–3.3)
MCHC RBC-ENTMCNC: 29.7 PG — SIGNIFICANT CHANGE UP (ref 27–34)
MCHC RBC-ENTMCNC: 33.5 GM/DL — SIGNIFICANT CHANGE UP (ref 32–36)
MCV RBC AUTO: 88.7 FL — SIGNIFICANT CHANGE UP (ref 80–100)
MONOCYTES # BLD AUTO: 1.4 K/UL — HIGH (ref 0–0.9)
MONOCYTES NFR BLD AUTO: 7.5 % — SIGNIFICANT CHANGE UP (ref 2–14)
NEUTROPHILS # BLD AUTO: 8.46 K/UL — HIGH (ref 1.8–7.4)
NEUTROPHILS NFR BLD AUTO: 45.6 % — SIGNIFICANT CHANGE UP (ref 43–77)
NRBC # BLD: 0 /100 WBCS — SIGNIFICANT CHANGE UP (ref 0–0)
PLATELET # BLD AUTO: 245 K/UL — SIGNIFICANT CHANGE UP (ref 150–400)
RBC # BLD: 4.24 M/UL — SIGNIFICANT CHANGE UP (ref 4.2–5.8)
RBC # FLD: 15.2 % — HIGH (ref 10.3–14.5)
WBC # BLD: 18.6 K/UL — HIGH (ref 3.8–10.5)
WBC # FLD AUTO: 18.6 K/UL — HIGH (ref 3.8–10.5)

## 2023-07-12 PROCEDURE — 99214 OFFICE O/P EST MOD 30 MIN: CPT

## 2023-07-12 NOTE — PHYSICAL EXAM
[Restricted in physically strenuous activity but ambulatory and able to carry out work of a light or sedentary nature] : Status 1- Restricted in physically strenuous activity but ambulatory and able to carry out work of a light or sedentary nature, e.g., light house work, office work [Thin] : thin [Normal] : affect appropriate [de-identified] : cervical adenopathy not palpable   [de-identified] : slightly diminished L base  [de-identified] : regular with systolic murmur  [de-identified] : no pedal edema  [de-identified] : adenopathy- neck axilla inguinal much better barely palpable

## 2023-07-12 NOTE — HISTORY OF PRESENT ILLNESS
[de-identified] : LOBO RO is an 82 y.o. M with a PMH significant for HTN, HLD, current  1/2 PPD smoker x 65 years, and BPH, who has been referred to our office for an evaluation of an newly diagnosed lymphoma.\par \par 10/11/22 - Patient saw provider at [facility] with c/o 20 lbS  unintentional weight loss over past 2 months, no appetite, increased fatigue, and lymphadenopathy.  \par 10/8/22 - Has Abd US to evaluate of AAA - no evidence of AAA but showed extensive retroperitoneal adenopathy and splenomegaly.  Was sent for CT scans. \par 10/8/22 - CT Chest (?Lung cancer screening) -  Bulky multi station thoracoabdominal lymphadenopathy with splenomegaly, concerning for lymphoma.  Peripheral splenic hypodensity may represent an infarct or mass. Small right and moderate left pleural effusions with underlying pleural thickening on the left; consider cytologic correlation, as lymphomatous involvement is possible.\par \par 10/18/22 - CT A/P - Diffuse bulky para-aortic, periportal, and mesenteric lymphadenopathy. Bilateral bulky inguinal and iliac chain lymphadenopathy. For reference:\par * Pleural LN measures 5.4 x 4.5 cm.\par * Right inguinal LN measures 3.6 x 1.8 cm.\par * Confluent RPLAD surrounding the aorta measures 9.3 x 6.2 cm.\par \par 10/21/22 - PET/CT -  Large  and markedly avid conglomerate adenopathy in the neck, chest, abdomen, and pelvis ( SUV ranges 3-5) . Large loculated  left pleural effusion and small R pleural effusion.   Hypermetabolic 1 cm cutaneous lesion in the right chest wall. Recommend clinical inspection as neoplasm could have this appearance.\par \par 10/20/22 - Labs with elevated WBC 15K, Peripheral blood flow cytometry: Monotypic B-cells (10%) positive for Kappa, CD19, CD20, CD10, negative for CD5, , CD23. \par \par C/o fatigue. Weight  loss as above . No pain. No fevers . No night sweats. \par \par 11/4/22 R axillary  LN biopsy :  follicular lymphoma grade 1. \par L thoracentesis 11/4/22 \par \par Rituxan weekly x 4  11/30/22- 12/14/22 \par \par Hep B core positive. started on Entecavir antiviral prophylaxis\par \par PET 1/14/23 : compared to Oct 2022- no response - bulky FERNANDO and L pleural effusion no change. Evidence of splenic and small bowel involvement. \par \par 4/19/23 Started bendamustine ( reduced dose )/ rituxan  \par \par Immediate clinical response- palpable adenopathy resolving.  [de-identified] : Today c 4 d 1 of bendamustine and rituxan \par Tolerating chemo well but continues to loose weight. States- that because of taste change on chemo he does not have appetite and eats less. Tried Ensure and similar supplements but he does not like them - too sweet.

## 2023-07-12 NOTE — REVIEW OF SYSTEMS
[Recent Change In Weight] : ~T recent weight change [Negative] : Allergic/Immunologic [Fatigue] : no fatigue [Cough] : no cough [FreeTextEntry2] : per HPI  [FreeTextEntry8] : frequency- seeing urology for this

## 2023-07-12 NOTE — ASSESSMENT
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 Diagnosed in 2022\par Indications for treatment: symptoms ( weight  loss, fatigue).  Moderately bulky disease.\par \par Initially opted for immunotherapy only.\par \par S/p Rituxan x 4 completed 12/14/22. No response on follow up PET scan.\par \par Started bendamustine  at reduced dose / Rituxan on  4/19/23.\par  Bendamustine 70 mg/m2 ( dose reduced) D1 and D2  and Rituxan 375 mg/m2 D1  every 28 days x up to 4- 6 cycles.\par \par Clinically responding very well. Tolerating well.\par He will complete cycle 4 tomorrow. PET scan after cycle 4.\par \par If PET confirms good response- will stop chemo after this cycle ( cycle 4).\par \par Has ongoing weight loss and chemo is contributing- due to taste change on chemo\par \par Referred to dietician.\par \par He is Hep B core positive. No known hx of hepatitis.\par Continue antiviral prophylaxis.\par On  Entecavir 0.5 mg daily\par \par Exam in 4 weeks- after PET . \par \par D/w patient and his son. \par

## 2023-07-13 ENCOUNTER — NON-APPOINTMENT (OUTPATIENT)
Age: 83
End: 2023-07-13

## 2023-07-13 ENCOUNTER — APPOINTMENT (OUTPATIENT)
Dept: INFUSION THERAPY | Facility: CLINIC | Age: 83
End: 2023-07-13

## 2023-07-13 VITALS
OXYGEN SATURATION: 98 % | WEIGHT: 90.1 LBS | RESPIRATION RATE: 17 BRPM | SYSTOLIC BLOOD PRESSURE: 98 MMHG | BODY MASS INDEX: 18.2 KG/M2 | DIASTOLIC BLOOD PRESSURE: 66 MMHG | TEMPERATURE: 98.3 F

## 2023-07-13 DIAGNOSIS — Z79.899 OTHER LONG TERM (CURRENT) DRUG THERAPY: ICD-10-CM

## 2023-07-13 DIAGNOSIS — R11.2 NAUSEA WITH VOMITING, UNSPECIFIED: ICD-10-CM

## 2023-07-13 DIAGNOSIS — Z51.11 ENCOUNTER FOR ANTINEOPLASTIC CHEMOTHERAPY: ICD-10-CM

## 2023-07-13 LAB
ALBUMIN SERPL ELPH-MCNC: 4.2 G/DL — SIGNIFICANT CHANGE UP (ref 3.3–5)
ALP SERPL-CCNC: 109 U/L — SIGNIFICANT CHANGE UP (ref 40–120)
ALT FLD-CCNC: 13 U/L — SIGNIFICANT CHANGE UP (ref 10–45)
ANION GAP SERPL CALC-SCNC: 13 MMOL/L — SIGNIFICANT CHANGE UP (ref 5–17)
AST SERPL-CCNC: 16 U/L — SIGNIFICANT CHANGE UP (ref 10–40)
BILIRUB SERPL-MCNC: 0.4 MG/DL — SIGNIFICANT CHANGE UP (ref 0.2–1.2)
BUN SERPL-MCNC: 16 MG/DL — SIGNIFICANT CHANGE UP (ref 7–23)
CALCIUM SERPL-MCNC: 9.2 MG/DL — SIGNIFICANT CHANGE UP (ref 8.4–10.5)
CHLORIDE SERPL-SCNC: 105 MMOL/L — SIGNIFICANT CHANGE UP (ref 96–108)
CO2 SERPL-SCNC: 24 MMOL/L — SIGNIFICANT CHANGE UP (ref 22–31)
CREAT SERPL-MCNC: 0.75 MG/DL — SIGNIFICANT CHANGE UP (ref 0.5–1.3)
EGFR: 91 ML/MIN/1.73M2 — SIGNIFICANT CHANGE UP
GLUCOSE SERPL-MCNC: 75 MG/DL — SIGNIFICANT CHANGE UP (ref 70–99)
POTASSIUM SERPL-MCNC: 4.8 MMOL/L — SIGNIFICANT CHANGE UP (ref 3.5–5.3)
POTASSIUM SERPL-SCNC: 4.8 MMOL/L — SIGNIFICANT CHANGE UP (ref 3.5–5.3)
PROT SERPL-MCNC: 6.6 G/DL — SIGNIFICANT CHANGE UP (ref 6–8.3)
SODIUM SERPL-SCNC: 142 MMOL/L — SIGNIFICANT CHANGE UP (ref 135–145)

## 2023-07-14 DIAGNOSIS — Z51.89 ENCOUNTER FOR OTHER SPECIFIED AFTERCARE: ICD-10-CM

## 2023-07-29 ENCOUNTER — APPOINTMENT (OUTPATIENT)
Dept: NUCLEAR MEDICINE | Facility: CLINIC | Age: 83
End: 2023-07-29

## 2023-08-09 ENCOUNTER — APPOINTMENT (OUTPATIENT)
Dept: INFUSION THERAPY | Facility: CLINIC | Age: 83
End: 2023-08-09

## 2023-08-10 ENCOUNTER — APPOINTMENT (OUTPATIENT)
Dept: INFUSION THERAPY | Facility: CLINIC | Age: 83
End: 2023-08-10

## 2023-08-19 ENCOUNTER — APPOINTMENT (OUTPATIENT)
Dept: NUCLEAR MEDICINE | Facility: CLINIC | Age: 83
End: 2023-08-19
Payer: MEDICARE

## 2023-08-19 ENCOUNTER — OUTPATIENT (OUTPATIENT)
Dept: OUTPATIENT SERVICES | Facility: HOSPITAL | Age: 83
LOS: 1 days | End: 2023-08-19
Payer: MEDICARE

## 2023-08-19 DIAGNOSIS — Z98.890 OTHER SPECIFIED POSTPROCEDURAL STATES: Chronic | ICD-10-CM

## 2023-08-19 DIAGNOSIS — C82.00 FOLLICULAR LYMPHOMA GRADE I, UNSPECIFIED SITE: ICD-10-CM

## 2023-08-19 PROCEDURE — 78815 PET IMAGE W/CT SKULL-THIGH: CPT | Mod: 26,PS

## 2023-08-19 PROCEDURE — 78815 PET IMAGE W/CT SKULL-THIGH: CPT

## 2023-08-19 PROCEDURE — A9552: CPT

## 2023-08-24 ENCOUNTER — NON-APPOINTMENT (OUTPATIENT)
Age: 83
End: 2023-08-24

## 2023-08-30 ENCOUNTER — APPOINTMENT (OUTPATIENT)
Dept: HEMATOLOGY ONCOLOGY | Facility: CLINIC | Age: 83
End: 2023-08-30
Payer: MEDICARE

## 2023-08-30 PROCEDURE — 99442: CPT

## 2023-08-30 NOTE — ASSESSMENT
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 Diagnosed in 2022 Indications for treatment: symptoms ( weight  loss, fatigue).  Moderately bulky disease.  Initially opted for immunotherapy only.  S/p Rituxan x 4 completed 12/14/22. No response on follow up PET scan.  Started bendamustine  at reduced dose / Rituxan on  4/19/23.  Clinically responded  very well. Had ongoing weight loss due to  chemo ( taste changed, less desire to eat)  Does not want to continue.  PET scan 8/19/23 after 4 cycles of chemotherapy showed very good response.  Do not think that additional 2 cycles of chemotx will make a major impact.  Stop chemo and continue to monitor.  Return visit in 2 months. Questionable role for maintenance Rituxan ( did not respond to Rituxan single agent).  D/w pt and son Bear.  He is Hep B core positive. No known hx of hepatitis. Continue antiviral prophylaxis. On  Entecavir 0.5 mg daily

## 2023-08-30 NOTE — REASON FOR VISIT
[Home] : at home, [unfilled] , at the time of the visit. [Medical Office: ([facility])___] : at the medical office located in  [FreeTextEntry3] : joselin Sifuentes [Follow-Up Visit] : a follow-up visit for [Family Member] : family member [FreeTextEntry2] : follicular lymphoma s/p  bendamustine/ rituxan

## 2023-08-30 NOTE — HISTORY OF PRESENT ILLNESS
[de-identified] : LOBO RO is an 82 y.o. M with a PMH significant for HTN, HLD, current  1/2 PPD smoker x 65 years, and BPH, who has been referred to our office for an evaluation of an newly diagnosed lymphoma.  10/11/22 - Patient saw provider at [facility] with c/o 20 lbS  unintentional weight loss over past 2 months, no appetite, increased fatigue, and lymphadenopathy.   10/8/22 - Has Abd US to evaluate of AAA - no evidence of AAA but showed extensive retroperitoneal adenopathy and splenomegaly.  Was sent for CT scans.  10/8/22 - CT Chest (?Lung cancer screening) -  Bulky multi station thoracoabdominal lymphadenopathy with splenomegaly, concerning for lymphoma.  Peripheral splenic hypodensity may represent an infarct or mass. Small right and moderate left pleural effusions with underlying pleural thickening on the left; consider cytologic correlation, as lymphomatous involvement is possible.  10/18/22 - CT A/P - Diffuse bulky para-aortic, periportal, and mesenteric lymphadenopathy. Bilateral bulky inguinal and iliac chain lymphadenopathy. For reference: * Pleural LN measures 5.4 x 4.5 cm. * Right inguinal LN measures 3.6 x 1.8 cm. * Confluent RPLAD surrounding the aorta measures 9.3 x 6.2 cm.  10/21/22 - PET/CT -  Large  and markedly avid conglomerate adenopathy in the neck, chest, abdomen, and pelvis ( SUV ranges 3-5) . Large loculated  left pleural effusion and small R pleural effusion.   Hypermetabolic 1 cm cutaneous lesion in the right chest wall. Recommend clinical inspection as neoplasm could have this appearance.  10/20/22 - Labs with elevated WBC 15K, Peripheral blood flow cytometry: Monotypic B-cells (10%) positive for Kappa, CD19, CD20, CD10, negative for CD5, , CD23.   C/o fatigue. Weight  loss as above . No pain. No fevers . No night sweats.   11/4/22 R axillary  LN biopsy :  follicular lymphoma grade 1.  L thoracentesis 11/4/22   Rituxan weekly x 4  11/30/22- 12/14/22   Hep B core positive. started on Entecavir antiviral prophylaxis  PET 1/14/23 : compared to Oct 2022- no response - bulky FERNANDO and L pleural effusion no change. Evidence of splenic and small bowel involvement.   4/19/23 Started bendamustine ( reduced dose )/ rituxan    Immediate clinical response- palpable adenopathy resolving.   Cycle 4 of bend/ Rituxan on 7/12/23   PET 8/19/23  post 4 cycles : IMPRESSION: Since prior FDG-PET/CT scan 1/14/2023: 1.  Response to therapy with significant involution of previously seen bulky hypermetabolic nodes above and below the diaphragm, which are now globally much smaller, with mild uptake relative to background, in most cases less than or similar to blood pool activity. [place] 2. 2.  Resolution of previously seen splenomegaly. Mild activity at suspected residual calcification is likely due to attenuation correction artifact. 3.  Loculated LEFT pleural effusion is now smaller. Small to moderate RIGHT pleural effusion is grossly stable. 4.  Previously seen RIGHT chest wall nodule has largely resolved.  [de-identified] : Feels better Energy better, eating better and gained little weight.

## 2023-09-01 ENCOUNTER — EMERGENCY (EMERGENCY)
Facility: HOSPITAL | Age: 83
LOS: 0 days | Discharge: LEFT BEFORE DISCHARGE | End: 2023-09-02
Attending: EMERGENCY MEDICINE
Payer: MEDICARE

## 2023-09-01 VITALS
DIASTOLIC BLOOD PRESSURE: 55 MMHG | OXYGEN SATURATION: 92 % | HEIGHT: 60 IN | HEART RATE: 69 BPM | TEMPERATURE: 98 F | SYSTOLIC BLOOD PRESSURE: 136 MMHG | RESPIRATION RATE: 19 BRPM

## 2023-09-01 DIAGNOSIS — C85.90 NON-HODGKIN LYMPHOMA, UNSPECIFIED, UNSPECIFIED SITE: ICD-10-CM

## 2023-09-01 DIAGNOSIS — R55 SYNCOPE AND COLLAPSE: ICD-10-CM

## 2023-09-01 DIAGNOSIS — E78.5 HYPERLIPIDEMIA, UNSPECIFIED: ICD-10-CM

## 2023-09-01 DIAGNOSIS — Z98.890 OTHER SPECIFIED POSTPROCEDURAL STATES: Chronic | ICD-10-CM

## 2023-09-01 DIAGNOSIS — F17.290 NICOTINE DEPENDENCE, OTHER TOBACCO PRODUCT, UNCOMPLICATED: ICD-10-CM

## 2023-09-01 DIAGNOSIS — I35.0 NONRHEUMATIC AORTIC (VALVE) STENOSIS: ICD-10-CM

## 2023-09-01 DIAGNOSIS — J90 PLEURAL EFFUSION, NOT ELSEWHERE CLASSIFIED: ICD-10-CM

## 2023-09-01 DIAGNOSIS — N40.0 BENIGN PROSTATIC HYPERPLASIA WITHOUT LOWER URINARY TRACT SYMPTOMS: ICD-10-CM

## 2023-09-01 DIAGNOSIS — I10 ESSENTIAL (PRIMARY) HYPERTENSION: ICD-10-CM

## 2023-09-01 DIAGNOSIS — R41.0 DISORIENTATION, UNSPECIFIED: ICD-10-CM

## 2023-09-01 LAB
ABO RH CONFIRMATION: SIGNIFICANT CHANGE UP
ALBUMIN SERPL ELPH-MCNC: 3.2 G/DL — LOW (ref 3.3–5)
ALP SERPL-CCNC: 89 U/L — SIGNIFICANT CHANGE UP (ref 40–120)
ALT FLD-CCNC: 22 U/L — SIGNIFICANT CHANGE UP (ref 12–78)
ANION GAP SERPL CALC-SCNC: 3 MMOL/L — LOW (ref 5–17)
APPEARANCE UR: CLEAR — SIGNIFICANT CHANGE UP
APTT BLD: 35.7 SEC — HIGH (ref 24.5–35.6)
AST SERPL-CCNC: 20 U/L — SIGNIFICANT CHANGE UP (ref 15–37)
BACTERIA # UR AUTO: ABNORMAL
BASOPHILS # BLD AUTO: 0.12 K/UL — SIGNIFICANT CHANGE UP (ref 0–0.2)
BASOPHILS NFR BLD AUTO: 1 % — SIGNIFICANT CHANGE UP (ref 0–2)
BILIRUB SERPL-MCNC: 0.6 MG/DL — SIGNIFICANT CHANGE UP (ref 0.2–1.2)
BILIRUB UR-MCNC: NEGATIVE — SIGNIFICANT CHANGE UP
BLD GP AB SCN SERPL QL: SIGNIFICANT CHANGE UP
BUN SERPL-MCNC: 16 MG/DL — SIGNIFICANT CHANGE UP (ref 7–23)
CALCIUM SERPL-MCNC: 8.6 MG/DL — SIGNIFICANT CHANGE UP (ref 8.5–10.1)
CHLORIDE SERPL-SCNC: 109 MMOL/L — HIGH (ref 96–108)
CK SERPL-CCNC: 109 U/L — SIGNIFICANT CHANGE UP (ref 26–308)
CO2 SERPL-SCNC: 25 MMOL/L — SIGNIFICANT CHANGE UP (ref 22–31)
COLOR SPEC: YELLOW — SIGNIFICANT CHANGE UP
CREAT SERPL-MCNC: 0.78 MG/DL — SIGNIFICANT CHANGE UP (ref 0.5–1.3)
DIFF PNL FLD: ABNORMAL
EGFR: 90 ML/MIN/1.73M2 — SIGNIFICANT CHANGE UP
EOSINOPHIL # BLD AUTO: 2.91 K/UL — HIGH (ref 0–0.5)
EOSINOPHIL NFR BLD AUTO: 24 % — HIGH (ref 0–6)
EPI CELLS # UR: SIGNIFICANT CHANGE UP
GLUCOSE SERPL-MCNC: 118 MG/DL — HIGH (ref 70–99)
GLUCOSE UR QL: NEGATIVE — SIGNIFICANT CHANGE UP
HCT VFR BLD CALC: 37.1 % — LOW (ref 39–50)
HGB BLD-MCNC: 12.4 G/DL — LOW (ref 13–17)
INR BLD: 0.95 RATIO — SIGNIFICANT CHANGE UP (ref 0.85–1.18)
KETONES UR-MCNC: NEGATIVE — SIGNIFICANT CHANGE UP
LEUKOCYTE ESTERASE UR-ACNC: NEGATIVE — SIGNIFICANT CHANGE UP
LYMPHOCYTES # BLD AUTO: 2.55 K/UL — SIGNIFICANT CHANGE UP (ref 1–3.3)
LYMPHOCYTES # BLD AUTO: 21 % — SIGNIFICANT CHANGE UP (ref 13–44)
MANUAL SMEAR VERIFICATION: SIGNIFICANT CHANGE UP
MCHC RBC-ENTMCNC: 30.2 PG — SIGNIFICANT CHANGE UP (ref 27–34)
MCHC RBC-ENTMCNC: 33.4 GM/DL — SIGNIFICANT CHANGE UP (ref 32–36)
MCV RBC AUTO: 90.3 FL — SIGNIFICANT CHANGE UP (ref 80–100)
MONOCYTES # BLD AUTO: 0.24 K/UL — SIGNIFICANT CHANGE UP (ref 0–0.9)
MONOCYTES NFR BLD AUTO: 2 % — SIGNIFICANT CHANGE UP (ref 2–14)
NEUTROPHILS # BLD AUTO: 5.46 K/UL — SIGNIFICANT CHANGE UP (ref 1.8–7.4)
NEUTROPHILS NFR BLD AUTO: 45 % — SIGNIFICANT CHANGE UP (ref 43–77)
NITRITE UR-MCNC: NEGATIVE — SIGNIFICANT CHANGE UP
NRBC # BLD: 0 /100 — SIGNIFICANT CHANGE UP (ref 0–0)
NRBC # BLD: SIGNIFICANT CHANGE UP /100 WBCS (ref 0–0)
PH UR: 7 — SIGNIFICANT CHANGE UP (ref 5–8)
PLAT MORPH BLD: NORMAL — SIGNIFICANT CHANGE UP
PLATELET # BLD AUTO: 232 K/UL — SIGNIFICANT CHANGE UP (ref 150–400)
POTASSIUM SERPL-MCNC: 4 MMOL/L — SIGNIFICANT CHANGE UP (ref 3.5–5.3)
POTASSIUM SERPL-SCNC: 4 MMOL/L — SIGNIFICANT CHANGE UP (ref 3.5–5.3)
PROT SERPL-MCNC: 7.1 GM/DL — SIGNIFICANT CHANGE UP (ref 6–8.3)
PROT UR-MCNC: NEGATIVE — SIGNIFICANT CHANGE UP
PROTHROM AB SERPL-ACNC: 10.7 SEC — SIGNIFICANT CHANGE UP (ref 9.5–13)
RBC # BLD: 4.11 M/UL — LOW (ref 4.2–5.8)
RBC # FLD: 14.4 % — SIGNIFICANT CHANGE UP (ref 10.3–14.5)
RBC BLD AUTO: NORMAL — SIGNIFICANT CHANGE UP
RBC CASTS # UR COMP ASSIST: ABNORMAL /HPF (ref 0–4)
SODIUM SERPL-SCNC: 137 MMOL/L — SIGNIFICANT CHANGE UP (ref 135–145)
SP GR SPEC: 1.01 — SIGNIFICANT CHANGE UP (ref 1.01–1.02)
TROPONIN I, HIGH SENSITIVITY RESULT: 12.56 NG/L — SIGNIFICANT CHANGE UP
TROPONIN I, HIGH SENSITIVITY RESULT: 13.43 NG/L — SIGNIFICANT CHANGE UP
UROBILINOGEN FLD QL: NEGATIVE — SIGNIFICANT CHANGE UP
VARIANT LYMPHS # BLD: 7 % — HIGH (ref 0–6)
WBC # BLD: 12.14 K/UL — HIGH (ref 3.8–10.5)
WBC # FLD AUTO: 12.14 K/UL — HIGH (ref 3.8–10.5)
WBC UR QL: SIGNIFICANT CHANGE UP /HPF (ref 0–5)

## 2023-09-01 PROCEDURE — 99223 1ST HOSP IP/OBS HIGH 75: CPT

## 2023-09-01 PROCEDURE — 86850 RBC ANTIBODY SCREEN: CPT

## 2023-09-01 PROCEDURE — 81001 URINALYSIS AUTO W/SCOPE: CPT

## 2023-09-01 PROCEDURE — 86901 BLOOD TYPING SEROLOGIC RH(D): CPT

## 2023-09-01 PROCEDURE — 85730 THROMBOPLASTIN TIME PARTIAL: CPT

## 2023-09-01 PROCEDURE — 85027 COMPLETE CBC AUTOMATED: CPT

## 2023-09-01 PROCEDURE — 82550 ASSAY OF CK (CPK): CPT

## 2023-09-01 PROCEDURE — 87086 URINE CULTURE/COLONY COUNT: CPT

## 2023-09-01 PROCEDURE — 93306 TTE W/DOPPLER COMPLETE: CPT | Mod: 26

## 2023-09-01 PROCEDURE — 71275 CT ANGIOGRAPHY CHEST: CPT | Mod: 26,MA

## 2023-09-01 PROCEDURE — 93010 ELECTROCARDIOGRAM REPORT: CPT

## 2023-09-01 PROCEDURE — 71275 CT ANGIOGRAPHY CHEST: CPT | Mod: MA

## 2023-09-01 PROCEDURE — 85610 PROTHROMBIN TIME: CPT

## 2023-09-01 PROCEDURE — 86900 BLOOD TYPING SEROLOGIC ABO: CPT

## 2023-09-01 PROCEDURE — 80048 BASIC METABOLIC PNL TOTAL CA: CPT

## 2023-09-01 PROCEDURE — 80053 COMPREHEN METABOLIC PANEL: CPT

## 2023-09-01 PROCEDURE — 99285 EMERGENCY DEPT VISIT HI MDM: CPT | Mod: 25

## 2023-09-01 PROCEDURE — 70450 CT HEAD/BRAIN W/O DYE: CPT | Mod: MA

## 2023-09-01 PROCEDURE — 99285 EMERGENCY DEPT VISIT HI MDM: CPT

## 2023-09-01 PROCEDURE — G0378: CPT

## 2023-09-01 PROCEDURE — 71045 X-RAY EXAM CHEST 1 VIEW: CPT | Mod: 26

## 2023-09-01 PROCEDURE — 84484 ASSAY OF TROPONIN QUANT: CPT

## 2023-09-01 PROCEDURE — 70450 CT HEAD/BRAIN W/O DYE: CPT | Mod: 26,MA

## 2023-09-01 PROCEDURE — 85025 COMPLETE CBC W/AUTO DIFF WBC: CPT

## 2023-09-01 PROCEDURE — 93306 TTE W/DOPPLER COMPLETE: CPT

## 2023-09-01 PROCEDURE — 71045 X-RAY EXAM CHEST 1 VIEW: CPT

## 2023-09-01 PROCEDURE — 93005 ELECTROCARDIOGRAM TRACING: CPT

## 2023-09-01 PROCEDURE — 36415 COLL VENOUS BLD VENIPUNCTURE: CPT

## 2023-09-01 RX ORDER — LISINOPRIL 2.5 MG/1
1 TABLET ORAL
Qty: 0 | Refills: 0 | DISCHARGE

## 2023-09-01 RX ORDER — LANOLIN ALCOHOL/MO/W.PET/CERES
3 CREAM (GRAM) TOPICAL AT BEDTIME
Refills: 0 | Status: DISCONTINUED | OUTPATIENT
Start: 2023-09-01 | End: 2023-09-02

## 2023-09-01 RX ORDER — CHOLECALCIFEROL (VITAMIN D3) 125 MCG
1 CAPSULE ORAL
Qty: 0 | Refills: 0 | DISCHARGE

## 2023-09-01 RX ORDER — ACETAMINOPHEN 500 MG
650 TABLET ORAL EVERY 6 HOURS
Refills: 0 | Status: DISCONTINUED | OUTPATIENT
Start: 2023-09-01 | End: 2023-09-02

## 2023-09-01 RX ORDER — ACYCLOVIR SODIUM 500 MG
200 VIAL (EA) INTRAVENOUS
Refills: 0 | Status: DISCONTINUED | OUTPATIENT
Start: 2023-09-01 | End: 2023-09-02

## 2023-09-01 RX ORDER — ACETAMINOPHEN 500 MG
650 TABLET ORAL EVERY 6 HOURS
Refills: 0 | Status: DISCONTINUED | OUTPATIENT
Start: 2023-09-01 | End: 2023-09-01

## 2023-09-01 RX ORDER — SODIUM CHLORIDE 9 MG/ML
1000 INJECTION INTRAMUSCULAR; INTRAVENOUS; SUBCUTANEOUS ONCE
Refills: 0 | Status: COMPLETED | OUTPATIENT
Start: 2023-09-01 | End: 2023-09-01

## 2023-09-01 RX ORDER — ENTECAVIR 0.5 MG/1
0.5 TABLET ORAL DAILY
Refills: 0 | Status: DISCONTINUED | OUTPATIENT
Start: 2023-09-01 | End: 2023-09-02

## 2023-09-01 RX ORDER — ONDANSETRON 8 MG/1
4 TABLET, FILM COATED ORAL EVERY 6 HOURS
Refills: 0 | Status: DISCONTINUED | OUTPATIENT
Start: 2023-09-01 | End: 2023-09-02

## 2023-09-01 RX ORDER — TAMSULOSIN HYDROCHLORIDE 0.4 MG/1
1 CAPSULE ORAL
Qty: 0 | Refills: 0 | DISCHARGE

## 2023-09-01 RX ORDER — SOLIFENACIN SUCCINATE 10 MG/1
10 TABLET ORAL DAILY
Refills: 0 | Status: DISCONTINUED | OUTPATIENT
Start: 2023-09-01 | End: 2023-09-02

## 2023-09-01 RX ORDER — SODIUM CHLORIDE 9 MG/ML
3 INJECTION INTRAMUSCULAR; INTRAVENOUS; SUBCUTANEOUS ONCE
Refills: 0 | Status: COMPLETED | OUTPATIENT
Start: 2023-09-01 | End: 2023-09-01

## 2023-09-01 RX ORDER — FINASTERIDE 5 MG/1
5 TABLET, FILM COATED ORAL DAILY
Refills: 0 | Status: DISCONTINUED | OUTPATIENT
Start: 2023-09-01 | End: 2023-09-02

## 2023-09-01 RX ORDER — INFLUENZA VIRUS VACCINE 15; 15; 15; 15 UG/.5ML; UG/.5ML; UG/.5ML; UG/.5ML
0.7 SUSPENSION INTRAMUSCULAR ONCE
Refills: 0 | Status: DISCONTINUED | OUTPATIENT
Start: 2023-09-01 | End: 2023-09-02

## 2023-09-01 RX ORDER — FAMOTIDINE 10 MG/ML
20 INJECTION INTRAVENOUS DAILY
Refills: 0 | Status: DISCONTINUED | OUTPATIENT
Start: 2023-09-01 | End: 2023-09-02

## 2023-09-01 RX ORDER — ATORVASTATIN CALCIUM 80 MG/1
10 TABLET, FILM COATED ORAL AT BEDTIME
Refills: 0 | Status: DISCONTINUED | OUTPATIENT
Start: 2023-09-01 | End: 2023-09-02

## 2023-09-01 RX ADMIN — SODIUM CHLORIDE 3 MILLILITER(S): 9 INJECTION INTRAMUSCULAR; INTRAVENOUS; SUBCUTANEOUS at 08:09

## 2023-09-01 RX ADMIN — Medication 200 MILLIGRAM(S): at 22:09

## 2023-09-01 RX ADMIN — SODIUM CHLORIDE 1000 MILLILITER(S): 9 INJECTION INTRAMUSCULAR; INTRAVENOUS; SUBCUTANEOUS at 04:44

## 2023-09-01 RX ADMIN — Medication 3 MILLIGRAM(S): at 22:09

## 2023-09-01 RX ADMIN — ATORVASTATIN CALCIUM 10 MILLIGRAM(S): 80 TABLET, FILM COATED ORAL at 22:09

## 2023-09-01 NOTE — H&P ADULT - NSHPLABSRESULTS_GEN_ALL_CORE
CBC:            12.4   12.14 )-----------( 232      ( 09-01-23 @ 03:35 )             37.1         Chem:         ( 09-01-23 @ 03:35 )    137  |  109<H>  |  16  ----------------------------<  118<H>  4.0   |  25  |  0.78        Liver Functions: ( 09-01-23 @ 03:35 )  Alb: 3.2 g/dL / Pro: 7.1 gm/dL / ALK PHOS: 89 U/L / ALT: 22 U/L / AST: 20 U/L / GGT: x              Type & Screen:     < from: CT Angio Chest PE Protocol w/ IV Cont (09.01.23 @ 06:08) >    IMPRESSION:  No pulmonary thromboembolism.  Significantly decreased lymphadenopathy and splenomegaly.  New right pleural effusion.    --- End of Report ---    < end of copied text >    < from: CT Angio Chest PE Protocol w/ IV Cont (09.01.23 @ 06:08) >      LUNGS AND AIRWAYS: Patent central airways.  Lungs are clear.  PLEURA: Similar moderate left pleural effusion with passive atelectasis.   Right pleural effusion.    < end of copied text >

## 2023-09-01 NOTE — PHARMACOTHERAPY INTERVENTION NOTE - COMMENTS
Medication reconciliation completed.  Patient was unable to provide medication information, spoke to joselin Sifuentes at bedside and they provided current medication list; confirmed with Dr. First Funes.  Joselin Sifuentes confirms that pt had been on Chemo for lymphoma but has been cleared by his Doctors, last round of chemo was ~a month ago.

## 2023-09-01 NOTE — ED ADULT TRIAGE NOTE - PATIENT ON (OXYGEN DELIVERY METHOD)
2686 Pt in specials radiology for left nephrostomy tube placement. Explained procedure to pt and pt verbalizes understanding. Consent signed. 2564 Dr Mohan Velásquez to speak to pt.  9870 Pt attached to monitor and positioned prone on table. 7366 Left nephrostomy tube old site dressing removed. Small amount of yellowish drainage on dressing. Site prepped and draped. 1010 Unable to pass wire into kidney through old track. 1042 8 fr nephrostomy tube inserted in left kidney per Dr Mohan Velásquez. Pt tolerated well. 1044 Catheter sutured to left flank area. 1049 Triple antibiotic ointment applied to old site and split gauze dressing applied to catheter site covering old site and op-site dressing. Sites without redness, swelling or drainage at this time. 1053 Nephrostomy tube connected to leg bag.  1055 Pt positioned on cart for comfort. 1100 Pt Transferred to [facility] cart. [facility] air

## 2023-09-01 NOTE — ED PROVIDER NOTE - SKIN, MLM
Skin slightly pale; normal color for race, warm, dry and intact. No evidence of rash. good cap refill.

## 2023-09-01 NOTE — ED ADULT NURSE REASSESSMENT NOTE - NS ED NURSE REASSESS COMMENT FT1
Report received from JORGE Aranda. Pt resting comfortably in bed, denies any pain/ symptoms at this time, vital signs stable, awaiting bed.

## 2023-09-01 NOTE — ED ADULT NURSE NOTE - OBJECTIVE STATEMENT
Pt. presented to the ED with son c/o syncope. Per EMS, patient was at home with son and patient started complaining of light sensitivity, got dizzy and syncopized. Per  pt son, he found his father in the kitchen after he passed out. Unresponsive for 10mins. Unknown if pt. hit his head. +LOC. Pt. not on blood thinner. Pt. son also mentioned that he might be having dementia. Hx. of lymphoma had chemo 1 month ago. Denies HA, CP, SOB, n/v. Pt. presented to the ED with son c/o syncope. Per EMS, patient was at home with son and patient started complaining of light sensitivity, got dizzy and syncopized. Per  pt son, he found his father in the kitchen after he passed out. Unresponsive for 10mins. Unknown if pt. hit his head. +LOC. Pt. not on blood thinner. Pt. son also mentioned that pt. might be having dementia. Hx. of lymphoma had chemo 1 month ago. Denies HA, CP, SOB, n/v. Pt. presented to the ED with son c/o syncope. Per EMS, patient was at home with son and patient started complaining of light sensitivity, got dizzy and syncopized. Per  pt son, he found his father in the kitchen after he passed out. Unresponsive for 10mins. Pt. doesn't remember passing out. Unknown if pt. hit his head. +LOC. Pt. not on blood thinner. Pt. son also mentioned that pt. might be having dementia. Hx. of lymphoma had chemo 1 month ago. Denies HA, CP, SOB, n/v.

## 2023-09-01 NOTE — ED PROVIDER NOTE - MUSCULOSKELETAL, MLM
Spine appears normal, no back tenderness; range of motion is not limited, no muscle or joint tenderness

## 2023-09-01 NOTE — ED PROVIDER NOTE - CLINICAL SUMMARY MEDICAL DECISION MAKING FREE TEXT BOX
82 yo M with lymphoma, s/p syncope, now with AMS and weakness.     Could have infection, dehydration, MI, electrolyte disturbance.    EKG, labs, urine, CT head, CXR.

## 2023-09-01 NOTE — ED PROVIDER NOTE - NEUROLOGICAL, MLM
Alert and oriented, no focal deficits, no motor or sensory deficits. 5/5 UE and LE strength bilaterally

## 2023-09-01 NOTE — H&P ADULT - HISTORY OF PRESENT ILLNESS
84 yo M with hx of lymphoma no longer on chemo, BPH, HTN, hyperlipidemia presents BIB ambulance s/p syncope. Got up to go to the bathroom in the middle of the night and suddenly lost consciouness. Denies preceding chest pain.  Patient was found lying flat on the floor minimally responsive per grandson.  Grandson thinks patient fainted.  He was lying flat on the floor.  No sz like activity was noted. Episode lasted approx 10 min. Pt was somewhat confused when he awoke asking repetitive questions per grandson. Pt had syncopal episode approx 1 year ago. Grandson endorsed pt had echo done as o/p 1 year ago and was told it was a normal study - he cannot recall who the cardiologist was.    Patient does not take any anticoagulation. No new meds.     EKG: TWI in septal leads. TNI neg x 2  CTA: negative for PE. similar Right pleural effusion, no hypoxia  UA: some blood, moderate bacteria  CTH: negative  VSS        PAST MEDICAL/SURGICAL/FAMILY/SOCIAL HISTORY:    Past Medical, Past Surgical, and Family History:  PAST MEDICAL HISTORY:  Benign essential HTN     History of BPH     HLD (hyperlipidemia)     Lymphoma     Smoker.     PAST SURGICAL HISTORY:  H/O abdominal surgery.     Tobacco Usage:  · Tobacco Usage	Unknown if ever smoked    ALLERGIES AND HOME MEDICATIONS:   Allergies:        Allergies:  	No Known Allergies:

## 2023-09-01 NOTE — CONSULT NOTE ADULT - ASSESSMENT
Consult to follow Follicular lymphoma grade I, unspecified body region (202.00) (C82.00)  On antineoplastic chemotherapy (V58.69) (Z79.899)  84 y/o male with follicular lymphoma grade 1 Stage at least 3. Diagnosed in 2022  Indications for treatment: symptoms ( weight loss, fatigue). Moderately bulky disease.  ?  Initially opted for immunotherapy only.  ?  S/p Rituxan x 4 completed 12/14/22. No response on follow up PET scan.  ?  Started bendamustine at reduced dose / Rituxan on 4/19/23.  ?  Clinically responded very well. Had ongoing weight loss due to chemo ( taste changed, less desire to eat) Does not want to continue.  ?  PET scan 8/19/23 after 4 cycles of chemotherapy showed very good response.  ?  Do not think that additional 2 cycles of chemotx will make a major impact.  ?  Stop chemo and continue to monitor.  ?  Return visit in 2 months.  Questionable role for maintenance Rituxan ( did not respond to Rituxan single agent).        PET 8/19/23 post 4 cycles : IMPRESSION: Since prior FDG-PET/CT scan 1/14/2023:  1. Response to therapy with significant involution of previously seen bulky hypermetabolic nodes above and below the diaphragm, which are now globally much smaller, with mild uptake relative to background, in most cases less than or similar to blood pool activity. [place] 2.  2. Resolution of previously seen splenomegaly. Mild activity at suspected residual calcification is likely due to attenuation correction artifact.  3. Loculated LEFT pleural effusion is now smaller. Small to moderate RIGHT pleural effusion is grossly stable.  4. Previously seen RIGHT chest wall nodule has largely resolved. 84 y/o M with a PMHx of follicular lymphoma grade I, was on Rituxan & Bendamustine but not currently on tx, s/p syncopal episode.    # Follicular Lymphoma Grade I    - Dx 2022  - S/p Rituxan x4 , completed 12/14/2022 with no disease response on following PET/CT imaging  - Started bendamustine at reduced dose / Rituxan on 04/19/23, clinically responded well, but had ongoing weight loss and other symptoms, opted to d/c tx  - PET 08/19/23 post 4 cycles revealed response to therapy with significant involution of previously seen bulky hypermetabolic nodes above and below the diaphragm, which are now globally much smaller, with mild uptake relative to background, in most cases less than or similar to blood pool activity, resolution of previously seen splenomegaly, loculated LEFT pleural effusion smaller. Small to moderate RIGHT pleural effusion is grossly stable, previously seen RIGHT chest wall nodule has largely resolved.  - Plan was for patient to remain off tx and follow up 1-2 months in outpatient setting to discuss maintenance therapy with Rituxan only   - No plan for inpatient tx at this time      # Bilateral Pleural Effusions    - CT imaging this admission revealed bilateral pleural effusions  - Has received therapeutic / dx thoracentesis in the past   - If patient remains symptomatic in setting of pleural effusions, recommend Thoracic consult for evaluation of repeat thoracentesis, if done, can send off fluid cytology studies  - Continue supportive measures as necessary including O2 therapy

## 2023-09-01 NOTE — ED PROVIDER NOTE - CONSTITUTIONAL, MLM
normal... Elderly frail male; awake, alert, oriented to person, place, time/situation; poor historian, HEAD: no palpable hematoma

## 2023-09-01 NOTE — ED ADULT NURSE NOTE - NSFALLHARMRISKINTERV_ED_ALL_ED
Assistance OOB with selected safe patient handling equipment if applicable/Communicate risk of Fall with Harm to all staff, patient, and family/Orthostatic vital signs/Provide visual cue: red socks, yellow wristband, yellow gown, etc/Reinforce activity limits and safety measures with patient and family/Bed in lowest position, wheels locked, appropriate side rails in place/Call bell, personal items and telephone in reach/Instruct patient to call for assistance before getting out of bed/chair/stretcher/Non-slip footwear applied when patient is off stretcher/[place] to call system/Physically safe environment - no spills, clutter or unnecessary equipment/Purposeful Proactive Rounding/Room/bathroom lighting operational, light cord in reach

## 2023-09-01 NOTE — H&P ADULT - NSHPPHYSICALEXAM_GEN_ALL_CORE
ICU Vital Signs Last 24 Hrs  T(C): 36.4 (01 Sep 2023 10:29), Max: 36.7 (01 Sep 2023 09:05)  T(F): 97.6 (01 Sep 2023 10:29), Max: 98 (01 Sep 2023 09:05)  HR: 73 (01 Sep 2023 10:29) (69 - 79)  BP: 135/56 (01 Sep 2023 10:29) (135/56 - 149/68)  BP(mean): --  ABP: --  ABP(mean): --  RR: 18 (01 Sep 2023 10:29) (12 - 19)  SpO2: 97% (01 Sep 2023 10:29) (92% - 97%)    O2 Parameters below as of 01 Sep 2023 10:29  Patient On (Oxygen Delivery Method): room air            PHYSICAL EXAM:    Constitutional: NAD, awake and alert  HEENT: PERR, EOMI, Normal Hearing, MMM  Neck: Soft and supple, No LAD, No JVD; poor dentition. frail  Respiratory: decreased BS at bases b/l  Cardiovascular: S1 and S2, loud WYATT 4/6  Gastrointestinal: Bowel Sounds present, soft, nontender, nondistended, no guarding, no rebound  Extremities: No peripheral edema  Vascular: 2+ peripheral pulses  Neurological: A/O x 3, no focal deficits  Musculoskeletal: 5/5 strength b/l upper and lower extremities  Skin: No rashes

## 2023-09-01 NOTE — ED PROVIDER NOTE - OBJECTIVE STATEMENT
Pt. is a 82 yo M with hx of lymphoma, BPH, HTN, hyperlipidemia presents BIB ambulance s/p syncope.  Patient was found lying flat on the floor minimally responsive per grandson.  Grandson thinks patient fainted.  He was lying flat on the floor.  Patient was too weak to stand or sit upright in a seat.  Patient now off chemo as lymphoma on PET scan recently had improved.  Patient now confused, repetitive with questions, does not remember what happened. Patient does not take any anticoagulation.

## 2023-09-01 NOTE — H&P ADULT - ASSESSMENT
#Syncope  #Suspect orthostasis coupled with AS  #EKG changes  - admit to ms on remote tele  - TNI neg x 2  - obtain 2D echo with cardio consult  - hold flomax for now  - check orthostatics  - son states, father has had poor appetite recently but has improved since chemo complete  - encourage po intake of water  - hold off any ivf for now with significant murmur      #bilateral symptomataic pleural effusion  #h/o lymphoma  - no hypoxia  - per son, underwent therapeutic/diagnostic tap to L lung approx 1 year ago  - will consult with oncology if recc repeat thoracentesis for cytology   - monitor o2s      Full code  DVT px: HSQ  Son Bear/hcp: [phone]

## 2023-09-01 NOTE — ED ADULT TRIAGE NOTE - CHIEF COMPLAINT QUOTE
Pt BIBEMS from home c/o syncope. Per EMS, patient was at home with son and patient started complaining of light sensitivity, got dizzy and syncopized. +LOC. - head strike. -blood thinners. Per son, pt was unresponsive for around 10 minutes. Pt denies HA, CP, SOB, n/n/d. Pt states he has a "funny feeling". Pt has hx of lymphoma and is currently in remission. Pt a/o2 in triage. Pt sent for EKG.

## 2023-09-01 NOTE — CONSULT NOTE ADULT - SUBJECTIVE AND OBJECTIVE BOX
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 Apparently he got up to go to the bathroom in the middle of the night and suddenly lost consciousness. According to the family, the patient was found lying flat on the floor minimally responsive with no seizure like activity was noted. According to family, he was out of it for about 10mins, somewhat confused when he awoke asking repetitive questions. Apparently he had syncopal episode approx 1 year ago, had Echo done as outpatient at that time that was apparently normal.    2023:      PAST MEDICAL & SURGICAL HISTORY:    Benign essential HTN    HLD (hyperlipidemia)    Smoker    History of BPH    Lymphoma    H/O abdominal surgery        MEDICATIONS  (STANDING):    acyclovir   Oral Tab/Cap 200 milliGRAM(s) Oral two times a day  atorvastatin 10 milliGRAM(s) Oral at bedtime  entecavir 0.5 milliGRAM(s) Oral daily  finasteride 5 milliGRAM(s) Oral daily  influenza  Vaccine (HIGH DOSE) 0.7 milliLiter(s) IntraMuscular once  solifenacin 10 milliGRAM(s) Oral daily      MEDICATIONS  (PRN):    acetaminophen     Tablet .. 650 milliGRAM(s) Oral every 6 hours PRN Temp greater or equal to 38C (100.4F), Mild Pain (1 - 3)  aluminum hydroxide/magnesium hydroxide/simethicone Suspension 30 milliLiter(s) Oral every 4 hours PRN Dyspepsia  famotidine    Tablet 20 milliGRAM(s) Oral daily PRN for cough  melatonin 3 milliGRAM(s) Oral at bedtime PRN Insomnia  ondansetron Injectable 4 milliGRAM(s) IV Push every 6 hours PRN Nausea and/or Vomiting      ALLERGIES:     No Known Allergies        FAMILY HISTORY:    Nothing noted      REVIEW OF SYSTEMS:    Constitutional, Eyes, ENT, Cardiovascular, Respiratory, Gastrointestinal, Genitourinary, Musculoskeletal, Integumentary, Neurological, Psychiatric, Endocrine, Heme/Lymph and Allergic/Immunologic review of systems are otherwise negative except as noted in HPI.       VITALS:    T(C): 36.4 (01 Sep 2023 10:29), Max: 36.7 (01 Sep 2023 09:05)  T(F): 97.6 (01 Sep 2023 10:29), Max: 98 (01 Sep 2023 09:05)  HR: 73 (01 Sep 2023 10:29) (69 - 79)  BP: 135/56 (01 Sep 2023 10:29) (135/56 - 149/68)  BP(mean): --  RR: 18 (01 Sep 2023 10:29) (12 - 19)  SpO2: 97% (01 Sep 2023 10:29) (92% - 97%)    Parameters below as of 01 Sep 2023 10:29  Patient On (Oxygen Delivery Method): room air        PHYSICAL:    Constitutional:   Eyes: no conjunctival infection, anicteric.   ENT: pharynx is unremarkable   Neck: supple without JVD  Pulmonary: clear to auscultation bilaterally  Cardiac: RRR  Vascular: no calf tenderness, venous stasis changes, varices  Abdomen: normoactive bowel sounds, soft and nontender, no hepatosplenomegaly or masses appreciated  Lymphatic: no peripheral adenopathy appreciated  Musculoskeletal: full range of motion and no deformities appreciated  Skin: normal appearance, no rash/erythema  Neurology:   Psychiatric:       LABS:    CBC Full  -  ( 01 Sep 2023 03:35 )  WBC Count : 12.14 K/uL  RBC Count : 4.11 M/uL  Hemoglobin : 12.4 g/dL  Hematocrit : 37.1 %  Platelet Count - Automated : 232 K/uL  Mean Cell Volume : 90.3 fl  Mean Cell Hemoglobin : 30.2 pg  Mean Cell Hemoglobin Concentration : 33.4 gm/dL  Auto Neutrophil # : 5.46 K/uL  Auto Lymphocyte # : 2.55 K/uL  Auto Monocyte # : 0.24 K/uL  Auto Eosinophil # : 2.91 K/uL  Auto Basophil # : 0.12 K/uL  Auto Neutrophil % : 45.0 %  Auto Lymphocyte % : 21.0 %  Auto Monocyte % : 2.0 %  Auto Eosinophil % : 24.0 %  Auto Basophil % : 1.0 %        137  |  109<H>  |  16  ----------------------------<  118<H>  4.0   |  25  |  0.78    Ca    8.6      01 Sep 2023 03:35    TPro  7.1  /  Alb  3.2<L>  /  TBili  0.6  /  DBili  x   /  AST  20  /  ALT  22  /  AlkPhos  89      PT/INR - ( 01 Sep 2023 03:35 )   PT: 10.7 sec;   INR: 0.95 ratio         PTT - ( 01 Sep 2023 03:35 )  PTT:35.7 sec  Urinalysis Basic - ( 01 Sep 2023 06:00 )    Color: Yellow / Appearance: Clear / S.010 / pH: x  Gluc: x / Ketone: Negative  / Bili: Negative / Urobili: Negative   Blood: x / Protein: Negative / Nitrite: Negative   Leuk Esterase: Negative / RBC: 25-50 /HPF / WBC 0-2 /HPF   Sq Epi: x / Non Sq Epi: x / Bacteria: Occasional        RADIOLOGY & ADDITIONAL STUDIES:      CT Angio Chest PE Protocol w/ IV Cont (23 @ 06:08)    FINDINGS:    LUNGS AND AIRWAYS: Patent central airways.  Lungs are clear.  PLEURA: Similar moderate left pleural effusion with passive atelectasis.   Right pleural effusion.  MEDIASTINUM ANDHILA: Less prominent but remaining supraclavicular and   subcarinal lymph nodes. Paratracheal and axillary lymph nodes are   significantly decreased.  VESSELS: No pulmonary thromboembolism.  HEART: Heart size is normal. No pericardial effusion.  CHEST WALL AND LOWER NECK: Within normal limits.  VISUALIZED UPPER ABDOMEN: Limited evaluation due to arterial phase   images. Markedly decreased spleen size. Right hepatic and renal cysts.   Adrenal thickening of left adrenal gland. Possible retrocrural lymph   nodes.  BONES: Degenerative change.    IMPRESSION:  No pulmonary thromboembolism.  Significantly decreased lymphadenopathy and splenomegaly.  New right pleural effusion.        NM PET/CT Onc FDG Skull to Thigh, Subsq (23 @ 09:59)    IMPRESSION: Since prior FDG-PET/CT scan 2023:  1.  Response to therapy with significant involution of previously seen   bulky hypermetabolic nodes above and below the diaphragm, which are now   globally much smaller, with mild uptake relative to background, in most   cases less than or similar to blood pool activity. Deauville 2.  2.  Resolution of previously seen splenomegaly. Mild activity at   suspected residual calcification is likely due to attenuation correction   artifact.  3.  Loculated LEFT pleural effusion is now smaller. Small to moderate   RIGHT pleural effusion is grossly stable.  4.  Previously seen RIGHT chest wall nodule has largely resolved.       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 Apparently he got up to go to the bathroom in the middle of the night and suddenly lost consciousness. According to the family, the patient was found lying flat on the floor minimally responsive with no seizure like activity was noted. According to family, he was out of it for about 10mins, somewhat confused when he awoke asking repetitive questions. Apparently he had syncopal episode approx 1 year ago, had Echo done as outpatient at that time that was apparently normal.    2023: Spoke with family at bedside, patient in no acute distress, going to TTE      PAST MEDICAL & SURGICAL HISTORY:    Benign essential HTN    HLD (hyperlipidemia)    Smoker    History of BPH    Lymphoma    H/O abdominal surgery        MEDICATIONS  (STANDING):    acyclovir   Oral Tab/Cap 200 milliGRAM(s) Oral two times a day  atorvastatin 10 milliGRAM(s) Oral at bedtime  entecavir 0.5 milliGRAM(s) Oral daily  finasteride 5 milliGRAM(s) Oral daily  influenza  Vaccine (HIGH DOSE) 0.7 milliLiter(s) IntraMuscular once  solifenacin 10 milliGRAM(s) Oral daily      MEDICATIONS  (PRN):    acetaminophen     Tablet .. 650 milliGRAM(s) Oral every 6 hours PRN Temp greater or equal to 38C (100.4F), Mild Pain (1 - 3)  aluminum hydroxide/magnesium hydroxide/simethicone Suspension 30 milliLiter(s) Oral every 4 hours PRN Dyspepsia  famotidine    Tablet 20 milliGRAM(s) Oral daily PRN for cough  melatonin 3 milliGRAM(s) Oral at bedtime PRN Insomnia  ondansetron Injectable 4 milliGRAM(s) IV Push every 6 hours PRN Nausea and/or Vomiting      ALLERGIES:     No Known Allergies        FAMILY HISTORY:    Nothing noted      REVIEW OF SYSTEMS:    Constitutional, Eyes, ENT, Cardiovascular, Respiratory, Gastrointestinal, Genitourinary, Musculoskeletal, Integumentary, Neurological, Psychiatric, Endocrine, Heme/Lymph and Allergic/Immunologic review of systems are otherwise negative except as noted in HPI.       VITALS:    T(C): 36.4 (01 Sep 2023 10:29), Max: 36.7 (01 Sep 2023 09:05)  T(F): 97.6 (01 Sep 2023 10:29), Max: 98 (01 Sep 2023 09:05)  HR: 73 (01 Sep 2023 10:29) (69 - 79)  BP: 135/56 (01 Sep 2023 10:29) (135/56 - 149/68)  BP(mean): --  RR: 18 (01 Sep 2023 10:29) (12 - 19)  SpO2: 97% (01 Sep 2023 10:29) (92% - 97%)    Parameters below as of 01 Sep 2023 10:29  Patient On (Oxygen Delivery Method): room air        PHYSICAL:    Constitutional: no acute distress  Eyes: no conjunctival infection, anicteric.   ENT: pharynx is unremarkable   Neck: supple without JVD  Pulmonary: clear to auscultation bilaterally  Cardiac: RRR  Vascular: no calf tenderness, venous stasis changes, varices  Abdomen: normoactive bowel sounds, soft and nontender, no hepatosplenomegaly or masses appreciated  Lymphatic: no peripheral adenopathy appreciated  Musculoskeletal: full range of motion and no deformities appreciated  Skin: normal appearance, no rash/erythema  Neurology: awake, alert ; no obvious focal deficits      LABS:    CBC Full  -  ( 01 Sep 2023 03:35 )  WBC Count : 12.14 K/uL  RBC Count : 4.11 M/uL  Hemoglobin : 12.4 g/dL  Hematocrit : 37.1 %  Platelet Count - Automated : 232 K/uL  Mean Cell Volume : 90.3 fl  Mean Cell Hemoglobin : 30.2 pg  Mean Cell Hemoglobin Concentration : 33.4 gm/dL  Auto Neutrophil # : 5.46 K/uL  Auto Lymphocyte # : 2.55 K/uL  Auto Monocyte # : 0.24 K/uL  Auto Eosinophil # : 2.91 K/uL  Auto Basophil # : 0.12 K/uL  Auto Neutrophil % : 45.0 %  Auto Lymphocyte % : 21.0 %  Auto Monocyte % : 2.0 %  Auto Eosinophil % : 24.0 %  Auto Basophil % : 1.0 %        137  |  109<H>  |  16  ----------------------------<  118<H>  4.0   |  25  |  0.78    Ca    8.6      01 Sep 2023 03:35    TPro  7.1  /  Alb  3.2<L>  /  TBili  0.6  /  DBili  x   /  AST  20  /  ALT  22  /  AlkPhos  89      PT/INR - ( 01 Sep 2023 03:35 )   PT: 10.7 sec;   INR: 0.95 ratio         PTT - ( 01 Sep 2023 03:35 )  PTT:35.7 sec  Urinalysis Basic - ( 01 Sep 2023 06:00 )    Color: Yellow / Appearance: Clear / S.010 / pH: x  Gluc: x / Ketone: Negative  / Bili: Negative / Urobili: Negative   Blood: x / Protein: Negative / Nitrite: Negative   Leuk Esterase: Negative / RBC: 25-50 /HPF / WBC 0-2 /HPF   Sq Epi: x / Non Sq Epi: x / Bacteria: Occasional        RADIOLOGY & ADDITIONAL STUDIES:      CT Angio Chest PE Protocol w/ IV Cont (23 @ 06:08)    FINDINGS:    LUNGS AND AIRWAYS: Patent central airways.  Lungs are clear.  PLEURA: Similar moderate left pleural effusion with passive atelectasis.   Right pleural effusion.  MEDIASTINUM ANDHILA: Less prominent but remaining supraclavicular and   subcarinal lymph nodes. Paratracheal and axillary lymph nodes are   significantly decreased.  VESSELS: No pulmonary thromboembolism.  HEART: Heart size is normal. No pericardial effusion.  CHEST WALL AND LOWER NECK: Within normal limits.  VISUALIZED UPPER ABDOMEN: Limited evaluation due to arterial phase   images. Markedly decreased spleen size. Right hepatic and renal cysts.   Adrenal thickening of left adrenal gland. Possible retrocrural lymph   nodes.  BONES: Degenerative change.    IMPRESSION:  No pulmonary thromboembolism.  Significantly decreased lymphadenopathy and splenomegaly.  New right pleural effusion.        NM PET/CT Onc FDG Skull to Thigh, Subsq (23 @ 09:59)    IMPRESSION: Since prior FDG-PET/CT scan 2023:  1.  Response to therapy with significant involution of previously seen   bulky hypermetabolic nodes above and below the diaphragm, which are now   globally much smaller, with mild uptake relative to background, in most   cases less than or similar to blood pool activity. Deauville 2.  2.  Resolution of previously seen splenomegaly. Mild activity at   suspected residual calcification is likely due to attenuation correction   artifact.  3.  Loculated LEFT pleural effusion is now smaller. Small to moderate   RIGHT pleural effusion is grossly stable.  4.  Previously seen RIGHT chest wall nodule has largely resolved.

## 2023-09-01 NOTE — PATIENT PROFILE ADULT - FALL HARM RISK - HARM RISK INTERVENTIONS

## 2023-09-02 VITALS
SYSTOLIC BLOOD PRESSURE: 142 MMHG | OXYGEN SATURATION: 100 % | TEMPERATURE: 98 F | DIASTOLIC BLOOD PRESSURE: 69 MMHG | HEART RATE: 91 BPM | RESPIRATION RATE: 18 BRPM

## 2023-09-02 LAB
ANION GAP SERPL CALC-SCNC: 5 MMOL/L — SIGNIFICANT CHANGE UP (ref 5–17)
BUN SERPL-MCNC: 18 MG/DL — SIGNIFICANT CHANGE UP (ref 7–23)
CALCIUM SERPL-MCNC: 9.3 MG/DL — SIGNIFICANT CHANGE UP (ref 8.5–10.1)
CHLORIDE SERPL-SCNC: 109 MMOL/L — HIGH (ref 96–108)
CO2 SERPL-SCNC: 26 MMOL/L — SIGNIFICANT CHANGE UP (ref 22–31)
CREAT SERPL-MCNC: 0.92 MG/DL — SIGNIFICANT CHANGE UP (ref 0.5–1.3)
CULTURE RESULTS: SIGNIFICANT CHANGE UP
EGFR: 84 ML/MIN/1.73M2 — SIGNIFICANT CHANGE UP
GLUCOSE SERPL-MCNC: 118 MG/DL — HIGH (ref 70–99)
HCT VFR BLD CALC: 39.2 % — SIGNIFICANT CHANGE UP (ref 39–50)
HGB BLD-MCNC: 13.2 G/DL — SIGNIFICANT CHANGE UP (ref 13–17)
MCHC RBC-ENTMCNC: 30.2 PG — SIGNIFICANT CHANGE UP (ref 27–34)
MCHC RBC-ENTMCNC: 33.7 GM/DL — SIGNIFICANT CHANGE UP (ref 32–36)
MCV RBC AUTO: 89.7 FL — SIGNIFICANT CHANGE UP (ref 80–100)
PLATELET # BLD AUTO: 253 K/UL — SIGNIFICANT CHANGE UP (ref 150–400)
POTASSIUM SERPL-MCNC: 3.4 MMOL/L — LOW (ref 3.5–5.3)
POTASSIUM SERPL-SCNC: 3.4 MMOL/L — LOW (ref 3.5–5.3)
RBC # BLD: 4.37 M/UL — SIGNIFICANT CHANGE UP (ref 4.2–5.8)
RBC # FLD: 14.2 % — SIGNIFICANT CHANGE UP (ref 10.3–14.5)
SODIUM SERPL-SCNC: 140 MMOL/L — SIGNIFICANT CHANGE UP (ref 135–145)
SPECIMEN SOURCE: SIGNIFICANT CHANGE UP
TROPONIN I, HIGH SENSITIVITY RESULT: 17.68 NG/L — SIGNIFICANT CHANGE UP
WBC # BLD: 15.14 K/UL — HIGH (ref 3.8–10.5)
WBC # FLD AUTO: 15.14 K/UL — HIGH (ref 3.8–10.5)

## 2023-09-02 PROCEDURE — 93010 ELECTROCARDIOGRAM REPORT: CPT

## 2023-09-02 PROCEDURE — 99232 SBSQ HOSP IP/OBS MODERATE 35: CPT

## 2023-09-02 RX ORDER — POTASSIUM CHLORIDE 20 MEQ
40 PACKET (EA) ORAL EVERY 4 HOURS
Refills: 0 | Status: DISCONTINUED | OUTPATIENT
Start: 2023-09-02 | End: 2023-09-02

## 2023-09-02 RX ADMIN — Medication 40 MILLIEQUIVALENT(S): at 10:18

## 2023-09-02 RX ADMIN — Medication 200 MILLIGRAM(S): at 09:12

## 2023-09-02 RX ADMIN — FINASTERIDE 5 MILLIGRAM(S): 5 TABLET, FILM COATED ORAL at 09:13

## 2023-09-02 NOTE — PROGRESS NOTE ADULT - SUBJECTIVE AND OBJECTIVE BOX
Patient is a 83y old  Male who presents with a chief complaint of S/p Syncope (01 Sep 2023 12:09)      SUBJECTIVE:   HPI:  82 yo M with hx of lymphoma no longer on chemo, BPH, HTN, hyperlipidemia presents BIB ambulance s/p syncope. Got up to go to the bathroom in the middle of the night and suddenly lost consciouness. Denies preceding chest pain.  Patient was found lying flat on the floor minimally responsive per grandson.  Grandson thinks patient fainted.  He was lying flat on the floor.  No sz like activity was noted. Episode lasted approx 10 min. Pt was somewhat confused when he awoke asking repetitive questions per grandson. Pt had syncopal episode approx 1 year ago. Grandson endorsed pt had echo done as o/p 1 year ago and was told it was a normal study - he cannot recall who the cardiologist was.    Patient does not take any anticoagulation. No new meds.     EKG: TWI in septal leads. TNI neg x 2  CTA: negative for PE. similar Right pleural effusion, no hypoxia  UA: some blood, moderate bacteria  CTH: negative  VSS        PAST MEDICAL/SURGICAL/FAMILY/SOCIAL HISTORY:    Past Medical, Past Surgical, and Family History:  PAST MEDICAL HISTORY:  Benign essential HTN     History of BPH     HLD (hyperlipidemia)     Lymphoma     Smoker.     PAST SURGICAL HISTORY:  H/O abdominal surgery.     Tobacco Usage:  · Tobacco Usage	Unknown if ever smoked    ALLERGIES AND HOME MEDICATIONS:   Allergies:        Allergies:  	No Known Allergies:    (01 Sep 2023 11:43)        REVIEW OF SYSTEMS:    CONSTITUTIONAL: No weakness, fevers or chills  EYES/ENT: No visual changes;  No vertigo or throat pain   NECK: No pain or stiffness  RESPIRATORY: No cough, wheezing, hemoptysis; No shortness of breath  CARDIOVASCULAR: No chest pain or palpitations  GASTROINTESTINAL: No abdominal or epigastric pain. No nausea, vomiting, or hematemesis; No diarrhea or constipation. No melena or hematochezia.  GENITOURINARY: No dysuria, frequency or hematuria  NEUROLOGICAL: No numbness or weakness  SKIN: No itching, burning, rashes, or lesions   All other review of systems is negative unless indicated above      ICU Vital Signs Last 24 Hrs  T(C): 36.8 (02 Sep 2023 07:32), Max: 37.7 (01 Sep 2023 22:15)  T(F): 98.3 (02 Sep 2023 07:32), Max: 99.9 (01 Sep 2023 22:15)  HR: 91 (02 Sep 2023 07:32) (78 - 99)  BP: 142/69 (02 Sep 2023 07:32) (125/55 - 158/61)  BP(mean): --  ABP: --  ABP(mean): --  RR: 18 (02 Sep 2023 07:32) (16 - 18)  SpO2: 100% (02 Sep 2023 07:32) (93% - 100%)    O2 Parameters below as of 02 Sep 2023 07:32  Patient On (Oxygen Delivery Method): room air            I&O's Summary      CAPILLARY BLOOD GLUCOSE          PHYSICAL EXAM:    Constitutional: NAD, awake and alert,   HEENT: PERR, EOMI, Normal Hearing, MMM  Neck: Soft and supple, No LAD, No JVD  Respiratory: Breath sounds are clear bilaterally, No wheezing, rales or rhonchi  Cardiovascular: S1 and S2, regular rate and rhythm, no Murmurs, gallops or rubs  Gastrointestinal: Bowel Sounds present, soft, nontender, nondistended, no guarding, no rebound  Extremities: No peripheral edema  Vascular: 2+ peripheral pulses  Neurological: A/O x 3, no focal deficits  Musculoskeletal: 5/5 strength b/l upper and lower extremities  Skin: No rashes      LABS: All Labs Reviewed:                        13.2   15.14 )-----------( 253      ( 02 Sep 2023 07:26 )             39.2     09-02    140  |  109<H>  |  18  ----------------------------<  118<H>  3.4<L>   |  26  |  0.92    Ca    9.3      02 Sep 2023 07:26    TPro  7.1  /  Alb  3.2<L>  /  TBili  0.6  /  DBili  x   /  AST  20  /  ALT  22  /  AlkPhos  89  09-01    PT/INR - ( 01 Sep 2023 03:35 )   PT: 10.7 sec;   INR: 0.95 ratio         PTT - ( 01 Sep 2023 03:35 )  PTT:35.7 sec  CARDIAC MARKERS ( 01 Sep 2023 03:35 )  x     / x     / 109 U/L / x     / x              Blood Culture: 09-01 @ 06:00  Organism --  Gram Stain Blood -- Gram Stain --  Specimen Source Clean Catch Clean Catch (Midstream)  Culture-Blood --        RADIOLOGY/EKG: reviewed

## 2023-09-02 NOTE — PROGRESS NOTE ADULT - ASSESSMENT
#Syncope  #Suspect orthostasis coupled with AS  #EKG changes  - TNI neg x 2  - 2D echo: severe AS  - cardio consult  - made patient and son aware of the findings - pt is upset and wants to sign out AMA. Understands risks associated with his decision including injury/death.       #bilateral Asymptomataic pleural effusion  #h/o lymphoma  - no hypoxia  - per son, underwent therapeutic/diagnostic tap to L lung approx 1 year ago  - pt made aware of the effusions. He is not symptomatic at this time. Is not interested in pursuing any fluid analysis at this time. He is signing out AMA      Full code  DVT px: HSQ  Son Bear/hcp: [phone]  D/W son in detail

## 2023-09-06 ENCOUNTER — APPOINTMENT (OUTPATIENT)
Dept: INFUSION THERAPY | Facility: CLINIC | Age: 83
End: 2023-09-06

## 2023-09-07 ENCOUNTER — APPOINTMENT (OUTPATIENT)
Dept: INFUSION THERAPY | Facility: CLINIC | Age: 83
End: 2023-09-07

## 2023-09-12 NOTE — ED PROVIDER NOTE - EKG #1 DATE/TIME
You were seen at [facility] emergency department for abdominal pain, and constipation. In the ER, you were found to have urinary retention, and a Prater catheter was placed. The urology service would like to see you as soon as possible. You also have a hematoma on your left kidney that needs to be monitored. 01-Sep-2023 04:08

## 2023-10-02 ENCOUNTER — APPOINTMENT (OUTPATIENT)
Dept: CARDIOLOGY | Facility: CLINIC | Age: 83
End: 2023-10-02
Payer: MEDICARE

## 2023-10-02 VITALS
HEIGHT: 60 IN | SYSTOLIC BLOOD PRESSURE: 90 MMHG | HEART RATE: 81 BPM | WEIGHT: 87 LBS | DIASTOLIC BLOOD PRESSURE: 52 MMHG | BODY MASS INDEX: 17.08 KG/M2 | OXYGEN SATURATION: 98 %

## 2023-10-02 DIAGNOSIS — Z87.891 PERSONAL HISTORY OF NICOTINE DEPENDENCE: ICD-10-CM

## 2023-10-02 PROCEDURE — 99214 OFFICE O/P EST MOD 30 MIN: CPT | Mod: 25

## 2023-10-02 PROCEDURE — 93000 ELECTROCARDIOGRAM COMPLETE: CPT | Mod: 59

## 2023-10-25 ENCOUNTER — OUTPATIENT (OUTPATIENT)
Dept: OUTPATIENT SERVICES | Facility: HOSPITAL | Age: 83
LOS: 1 days | Discharge: HOME | End: 2023-10-25

## 2023-10-25 DIAGNOSIS — C82.00 FOLLICULAR LYMPHOMA GRADE I, UNSPECIFIED SITE: ICD-10-CM

## 2023-10-25 DIAGNOSIS — Z98.890 OTHER SPECIFIED POSTPROCEDURAL STATES: Chronic | ICD-10-CM

## 2023-10-27 ENCOUNTER — RESULT REVIEW (OUTPATIENT)
Age: 83
End: 2023-10-27

## 2023-10-27 ENCOUNTER — APPOINTMENT (OUTPATIENT)
Dept: HEMATOLOGY ONCOLOGY | Facility: CLINIC | Age: 83
End: 2023-10-27
Payer: MEDICARE

## 2023-10-27 VITALS
DIASTOLIC BLOOD PRESSURE: 62 MMHG | BODY MASS INDEX: 17.3 KG/M2 | OXYGEN SATURATION: 97 % | HEIGHT: 60 IN | RESPIRATION RATE: 16 BRPM | HEART RATE: 80 BPM | SYSTOLIC BLOOD PRESSURE: 97 MMHG | WEIGHT: 88.13 LBS | TEMPERATURE: 98.3 F

## 2023-10-27 DIAGNOSIS — R76.8 OTHER SPECIFIED ABNORMAL IMMUNOLOGICAL FINDINGS IN SERUM: ICD-10-CM

## 2023-10-27 LAB
BASOPHILS # BLD AUTO: 0.11 K/UL — SIGNIFICANT CHANGE UP (ref 0–0.2)
BASOPHILS # BLD AUTO: 0.11 K/UL — SIGNIFICANT CHANGE UP (ref 0–0.2)
BASOPHILS NFR BLD AUTO: 1 % — SIGNIFICANT CHANGE UP (ref 0–2)
BASOPHILS NFR BLD AUTO: 1 % — SIGNIFICANT CHANGE UP (ref 0–2)
EOSINOPHIL # BLD AUTO: 4.02 K/UL — HIGH (ref 0–0.5)
EOSINOPHIL # BLD AUTO: 4.02 K/UL — HIGH (ref 0–0.5)
EOSINOPHIL NFR BLD AUTO: 37 % — HIGH (ref 0–6)
EOSINOPHIL NFR BLD AUTO: 37 % — HIGH (ref 0–6)
GIANT PLATELETS BLD QL SMEAR: PRESENT — SIGNIFICANT CHANGE UP
GIANT PLATELETS BLD QL SMEAR: PRESENT — SIGNIFICANT CHANGE UP
HCT VFR BLD CALC: 37.1 % — LOW (ref 39–50)
HCT VFR BLD CALC: 37.1 % — LOW (ref 39–50)
HGB BLD-MCNC: 12.3 G/DL — LOW (ref 13–17)
HGB BLD-MCNC: 12.3 G/DL — LOW (ref 13–17)
LG PLATELETS BLD QL AUTO: SLIGHT — SIGNIFICANT CHANGE UP
LG PLATELETS BLD QL AUTO: SLIGHT — SIGNIFICANT CHANGE UP
LYMPHOCYTES # BLD AUTO: 19 % — SIGNIFICANT CHANGE UP (ref 13–44)
LYMPHOCYTES # BLD AUTO: 19 % — SIGNIFICANT CHANGE UP (ref 13–44)
LYMPHOCYTES # BLD AUTO: 2.06 K/UL — SIGNIFICANT CHANGE UP (ref 1–3.3)
LYMPHOCYTES # BLD AUTO: 2.06 K/UL — SIGNIFICANT CHANGE UP (ref 1–3.3)
MCHC RBC-ENTMCNC: 29.8 PG — SIGNIFICANT CHANGE UP (ref 27–34)
MCHC RBC-ENTMCNC: 29.8 PG — SIGNIFICANT CHANGE UP (ref 27–34)
MCHC RBC-ENTMCNC: 33.2 GM/DL — SIGNIFICANT CHANGE UP (ref 32–36)
MCHC RBC-ENTMCNC: 33.2 GM/DL — SIGNIFICANT CHANGE UP (ref 32–36)
MCV RBC AUTO: 89.8 FL — SIGNIFICANT CHANGE UP (ref 80–100)
MCV RBC AUTO: 89.8 FL — SIGNIFICANT CHANGE UP (ref 80–100)
METAMYELOCYTES # FLD: 2 % — HIGH (ref 0–0)
METAMYELOCYTES # FLD: 2 % — HIGH (ref 0–0)
MONOCYTES # BLD AUTO: 0.54 K/UL — SIGNIFICANT CHANGE UP (ref 0–0.9)
MONOCYTES # BLD AUTO: 0.54 K/UL — SIGNIFICANT CHANGE UP (ref 0–0.9)
MONOCYTES NFR BLD AUTO: 5 % — SIGNIFICANT CHANGE UP (ref 2–14)
MONOCYTES NFR BLD AUTO: 5 % — SIGNIFICANT CHANGE UP (ref 2–14)
NEUTROPHILS # BLD AUTO: 3.91 K/UL — SIGNIFICANT CHANGE UP (ref 1.8–7.4)
NEUTROPHILS # BLD AUTO: 3.91 K/UL — SIGNIFICANT CHANGE UP (ref 1.8–7.4)
NEUTROPHILS NFR BLD AUTO: 34 % — LOW (ref 43–77)
NEUTROPHILS NFR BLD AUTO: 34 % — LOW (ref 43–77)
NEUTS BAND # BLD: 2 % — SIGNIFICANT CHANGE UP (ref 0–8)
NEUTS BAND # BLD: 2 % — SIGNIFICANT CHANGE UP (ref 0–8)
NRBC # BLD: 0 /100 — SIGNIFICANT CHANGE UP (ref 0–0)
NRBC # BLD: 0 /100 — SIGNIFICANT CHANGE UP (ref 0–0)
NRBC # BLD: SIGNIFICANT CHANGE UP /100 WBCS (ref 0–0)
NRBC # BLD: SIGNIFICANT CHANGE UP /100 WBCS (ref 0–0)
PLAT MORPH BLD: NORMAL — SIGNIFICANT CHANGE UP
PLAT MORPH BLD: NORMAL — SIGNIFICANT CHANGE UP
PLATELET # BLD AUTO: 272 K/UL — SIGNIFICANT CHANGE UP (ref 150–400)
PLATELET # BLD AUTO: 272 K/UL — SIGNIFICANT CHANGE UP (ref 150–400)
RBC # BLD: 4.13 M/UL — LOW (ref 4.2–5.8)
RBC # BLD: 4.13 M/UL — LOW (ref 4.2–5.8)
RBC # FLD: 13.6 % — SIGNIFICANT CHANGE UP (ref 10.3–14.5)
RBC # FLD: 13.6 % — SIGNIFICANT CHANGE UP (ref 10.3–14.5)
RBC BLD AUTO: NORMAL — SIGNIFICANT CHANGE UP
RBC BLD AUTO: NORMAL — SIGNIFICANT CHANGE UP
WBC # BLD: 10.86 K/UL — HIGH (ref 3.8–10.5)
WBC # BLD: 10.86 K/UL — HIGH (ref 3.8–10.5)
WBC # FLD AUTO: 10.86 K/UL — HIGH (ref 3.8–10.5)
WBC # FLD AUTO: 10.86 K/UL — HIGH (ref 3.8–10.5)

## 2023-10-27 PROCEDURE — 99214 OFFICE O/P EST MOD 30 MIN: CPT

## 2023-10-28 LAB
ALBUMIN SERPL ELPH-MCNC: 4.2 G/DL — SIGNIFICANT CHANGE UP (ref 3.3–5)
ALBUMIN SERPL ELPH-MCNC: 4.2 G/DL — SIGNIFICANT CHANGE UP (ref 3.3–5)
ALP SERPL-CCNC: 126 U/L — HIGH (ref 40–120)
ALP SERPL-CCNC: 126 U/L — HIGH (ref 40–120)
ALT FLD-CCNC: 10 U/L — SIGNIFICANT CHANGE UP (ref 10–45)
ALT FLD-CCNC: 10 U/L — SIGNIFICANT CHANGE UP (ref 10–45)
ANION GAP SERPL CALC-SCNC: 10 MMOL/L — SIGNIFICANT CHANGE UP (ref 5–17)
ANION GAP SERPL CALC-SCNC: 10 MMOL/L — SIGNIFICANT CHANGE UP (ref 5–17)
AST SERPL-CCNC: 11 U/L — SIGNIFICANT CHANGE UP (ref 10–40)
AST SERPL-CCNC: 11 U/L — SIGNIFICANT CHANGE UP (ref 10–40)
BILIRUB SERPL-MCNC: 0.3 MG/DL — SIGNIFICANT CHANGE UP (ref 0.2–1.2)
BILIRUB SERPL-MCNC: 0.3 MG/DL — SIGNIFICANT CHANGE UP (ref 0.2–1.2)
BUN SERPL-MCNC: 21 MG/DL — SIGNIFICANT CHANGE UP (ref 7–23)
BUN SERPL-MCNC: 21 MG/DL — SIGNIFICANT CHANGE UP (ref 7–23)
CALCIUM SERPL-MCNC: 9.6 MG/DL — SIGNIFICANT CHANGE UP (ref 8.4–10.5)
CALCIUM SERPL-MCNC: 9.6 MG/DL — SIGNIFICANT CHANGE UP (ref 8.4–10.5)
CHLORIDE SERPL-SCNC: 101 MMOL/L — SIGNIFICANT CHANGE UP (ref 96–108)
CHLORIDE SERPL-SCNC: 101 MMOL/L — SIGNIFICANT CHANGE UP (ref 96–108)
CO2 SERPL-SCNC: 28 MMOL/L — SIGNIFICANT CHANGE UP (ref 22–31)
CO2 SERPL-SCNC: 28 MMOL/L — SIGNIFICANT CHANGE UP (ref 22–31)
CREAT SERPL-MCNC: 0.84 MG/DL — SIGNIFICANT CHANGE UP (ref 0.5–1.3)
CREAT SERPL-MCNC: 0.84 MG/DL — SIGNIFICANT CHANGE UP (ref 0.5–1.3)
EGFR: 88 ML/MIN/1.73M2 — SIGNIFICANT CHANGE UP
EGFR: 88 ML/MIN/1.73M2 — SIGNIFICANT CHANGE UP
GLUCOSE SERPL-MCNC: 164 MG/DL — HIGH (ref 70–99)
GLUCOSE SERPL-MCNC: 164 MG/DL — HIGH (ref 70–99)
POTASSIUM SERPL-MCNC: 4.8 MMOL/L — SIGNIFICANT CHANGE UP (ref 3.5–5.3)
POTASSIUM SERPL-MCNC: 4.8 MMOL/L — SIGNIFICANT CHANGE UP (ref 3.5–5.3)
POTASSIUM SERPL-SCNC: 4.8 MMOL/L — SIGNIFICANT CHANGE UP (ref 3.5–5.3)
POTASSIUM SERPL-SCNC: 4.8 MMOL/L — SIGNIFICANT CHANGE UP (ref 3.5–5.3)
PROT SERPL-MCNC: 7.1 G/DL — SIGNIFICANT CHANGE UP (ref 6–8.3)
PROT SERPL-MCNC: 7.1 G/DL — SIGNIFICANT CHANGE UP (ref 6–8.3)
SODIUM SERPL-SCNC: 139 MMOL/L — SIGNIFICANT CHANGE UP (ref 135–145)
SODIUM SERPL-SCNC: 139 MMOL/L — SIGNIFICANT CHANGE UP (ref 135–145)

## 2023-12-15 ENCOUNTER — APPOINTMENT (OUTPATIENT)
Dept: CT IMAGING | Facility: CLINIC | Age: 83
End: 2023-12-15
Payer: MEDICARE

## 2023-12-15 ENCOUNTER — OUTPATIENT (OUTPATIENT)
Dept: OUTPATIENT SERVICES | Facility: HOSPITAL | Age: 83
LOS: 1 days | End: 2023-12-15
Payer: MEDICARE

## 2023-12-15 DIAGNOSIS — Z98.890 OTHER SPECIFIED POSTPROCEDURAL STATES: Chronic | ICD-10-CM

## 2023-12-15 DIAGNOSIS — C82.00 FOLLICULAR LYMPHOMA GRADE I, UNSPECIFIED SITE: ICD-10-CM

## 2023-12-15 PROCEDURE — 74177 CT ABD & PELVIS W/CONTRAST: CPT

## 2023-12-15 PROCEDURE — 74177 CT ABD & PELVIS W/CONTRAST: CPT | Mod: 26

## 2023-12-15 PROCEDURE — 71260 CT THORAX DX C+: CPT | Mod: 26

## 2023-12-15 PROCEDURE — 71260 CT THORAX DX C+: CPT

## 2023-12-18 ENCOUNTER — APPOINTMENT (OUTPATIENT)
Dept: HEMATOLOGY ONCOLOGY | Facility: CLINIC | Age: 83
End: 2023-12-18
Payer: MEDICARE

## 2023-12-18 ENCOUNTER — RESULT REVIEW (OUTPATIENT)
Age: 83
End: 2023-12-18

## 2023-12-18 VITALS
SYSTOLIC BLOOD PRESSURE: 89 MMHG | TEMPERATURE: 97.3 F | OXYGEN SATURATION: 96 % | DIASTOLIC BLOOD PRESSURE: 54 MMHG | HEART RATE: 87 BPM | HEIGHT: 60 IN | RESPIRATION RATE: 16 BRPM | WEIGHT: 88.19 LBS | BODY MASS INDEX: 17.31 KG/M2

## 2023-12-18 DIAGNOSIS — Z79.899 OTHER LONG TERM (CURRENT) DRUG THERAPY: ICD-10-CM

## 2023-12-18 DIAGNOSIS — R63.4 ABNORMAL WEIGHT LOSS: ICD-10-CM

## 2023-12-18 LAB
BASOPHILS # BLD AUTO: 0.12 K/UL — SIGNIFICANT CHANGE UP (ref 0–0.2)
BASOPHILS # BLD AUTO: 0.12 K/UL — SIGNIFICANT CHANGE UP (ref 0–0.2)
BASOPHILS NFR BLD AUTO: 0.8 % — SIGNIFICANT CHANGE UP (ref 0–2)
BASOPHILS NFR BLD AUTO: 0.8 % — SIGNIFICANT CHANGE UP (ref 0–2)
EOSINOPHIL # BLD AUTO: 5.4 K/UL — HIGH (ref 0–0.5)
EOSINOPHIL # BLD AUTO: 5.4 K/UL — HIGH (ref 0–0.5)
EOSINOPHIL NFR BLD AUTO: 35.3 % — HIGH (ref 0–6)
EOSINOPHIL NFR BLD AUTO: 35.3 % — HIGH (ref 0–6)
HCT VFR BLD CALC: 43.6 % — SIGNIFICANT CHANGE UP (ref 39–50)
HCT VFR BLD CALC: 43.6 % — SIGNIFICANT CHANGE UP (ref 39–50)
HGB BLD-MCNC: 14 G/DL — SIGNIFICANT CHANGE UP (ref 13–17)
HGB BLD-MCNC: 14 G/DL — SIGNIFICANT CHANGE UP (ref 13–17)
IMM GRANULOCYTES NFR BLD AUTO: 3.9 % — HIGH (ref 0–0.9)
IMM GRANULOCYTES NFR BLD AUTO: 3.9 % — HIGH (ref 0–0.9)
LYMPHOCYTES # BLD AUTO: 15.9 % — SIGNIFICANT CHANGE UP (ref 13–44)
LYMPHOCYTES # BLD AUTO: 15.9 % — SIGNIFICANT CHANGE UP (ref 13–44)
LYMPHOCYTES # BLD AUTO: 2.44 K/UL — SIGNIFICANT CHANGE UP (ref 1–3.3)
LYMPHOCYTES # BLD AUTO: 2.44 K/UL — SIGNIFICANT CHANGE UP (ref 1–3.3)
MACROCYTES BLD QL: SLIGHT — SIGNIFICANT CHANGE UP
MACROCYTES BLD QL: SLIGHT — SIGNIFICANT CHANGE UP
MCHC RBC-ENTMCNC: 28.7 PG — SIGNIFICANT CHANGE UP (ref 27–34)
MCHC RBC-ENTMCNC: 28.7 PG — SIGNIFICANT CHANGE UP (ref 27–34)
MCHC RBC-ENTMCNC: 32.1 GM/DL — SIGNIFICANT CHANGE UP (ref 32–36)
MCHC RBC-ENTMCNC: 32.1 GM/DL — SIGNIFICANT CHANGE UP (ref 32–36)
MCV RBC AUTO: 89.3 FL — SIGNIFICANT CHANGE UP (ref 80–100)
MCV RBC AUTO: 89.3 FL — SIGNIFICANT CHANGE UP (ref 80–100)
MONOCYTES # BLD AUTO: 0.84 K/UL — SIGNIFICANT CHANGE UP (ref 0–0.9)
MONOCYTES # BLD AUTO: 0.84 K/UL — SIGNIFICANT CHANGE UP (ref 0–0.9)
MONOCYTES NFR BLD AUTO: 5.5 % — SIGNIFICANT CHANGE UP (ref 2–14)
MONOCYTES NFR BLD AUTO: 5.5 % — SIGNIFICANT CHANGE UP (ref 2–14)
NEUTROPHILS # BLD AUTO: 5.91 K/UL — SIGNIFICANT CHANGE UP (ref 1.8–7.4)
NEUTROPHILS # BLD AUTO: 5.91 K/UL — SIGNIFICANT CHANGE UP (ref 1.8–7.4)
NEUTROPHILS NFR BLD AUTO: 38.6 % — LOW (ref 43–77)
NEUTROPHILS NFR BLD AUTO: 38.6 % — LOW (ref 43–77)
NRBC # BLD: 0 /100 WBCS — SIGNIFICANT CHANGE UP (ref 0–0)
NRBC # BLD: 0 /100 WBCS — SIGNIFICANT CHANGE UP (ref 0–0)
PLAT MORPH BLD: NORMAL — SIGNIFICANT CHANGE UP
PLAT MORPH BLD: NORMAL — SIGNIFICANT CHANGE UP
PLATELET # BLD AUTO: 306 K/UL — SIGNIFICANT CHANGE UP (ref 150–400)
PLATELET # BLD AUTO: 306 K/UL — SIGNIFICANT CHANGE UP (ref 150–400)
RBC # BLD: 4.88 M/UL — SIGNIFICANT CHANGE UP (ref 4.2–5.8)
RBC # BLD: 4.88 M/UL — SIGNIFICANT CHANGE UP (ref 4.2–5.8)
RBC # FLD: 13.7 % — SIGNIFICANT CHANGE UP (ref 10.3–14.5)
RBC # FLD: 13.7 % — SIGNIFICANT CHANGE UP (ref 10.3–14.5)
RBC BLD AUTO: SIGNIFICANT CHANGE UP
RBC BLD AUTO: SIGNIFICANT CHANGE UP
WBC # BLD: 15.3 K/UL — HIGH (ref 3.8–10.5)
WBC # BLD: 15.3 K/UL — HIGH (ref 3.8–10.5)
WBC # FLD AUTO: 15.3 K/UL — HIGH (ref 3.8–10.5)
WBC # FLD AUTO: 15.3 K/UL — HIGH (ref 3.8–10.5)

## 2023-12-18 PROCEDURE — 99215 OFFICE O/P EST HI 40 MIN: CPT

## 2023-12-18 NOTE — ASSESSMENT
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 Diagnosed in 2022 Indications for treatment: symptoms ( weight loss, fatigue). Moderately bulky disease.  Initially opted for immunotherapy only.  S/p Rituxan x 4 completed 12/14/22. No response on follow up PET scan.  S/p bendamustine at reduced dose / Rituxan x 4 cycles 4/19/23- 7/12/23.  very good clinical response and on PET 8/19/23.  Did not want to continue chemo due to poor taste from chemo/ weight loss. Monitored. CT scan  CAP Dec 2023- good and ongoing response to therapy.  Continuous weight loss and new / persistent easinophilia.  Etiology unclear. R/o GI parasites- but no recent travel Send stool for O+P  No pulmonary or dermatologic issues. No new meds . No overt allergic sx. ? 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  Sent stool studies, IgE, IgG4, tryptase. Discussed nutrition- necessary caloric intake. Will add more protein shakes.   Re-evaluate in 4-6 weeks. Might need expanded hematologic w/up - molecular studies for MPD/ possibly bone marrow bx.  He is Hep B core positive. No known hx of hepatitis. Continue antiviral prophylaxis for 6 more months after rituxan completed . On Entecavir 0.5 mg daily.  D/w skyler and his son.

## 2023-12-18 NOTE — HISTORY OF PRESENT ILLNESS
[de-identified] : LOBO RO is an 82 y.o. M with a PMH significant for HTN, HLD, current  1/2 PPD smoker x 65 years, and BPH, who has been referred to our office for an evaluation of an newly diagnosed lymphoma.  10/11/22 - Patient saw provider at [facility] with c/o 20 lbS  unintentional weight loss over past 2 months, no appetite, increased fatigue, and lymphadenopathy.   10/8/22 - Has Abd US to evaluate of AAA - no evidence of AAA but showed extensive retroperitoneal adenopathy and splenomegaly.  Was sent for CT scans.  10/8/22 - CT Chest (?Lung cancer screening) -  Bulky multi station thoracoabdominal lymphadenopathy with splenomegaly, concerning for lymphoma.  Peripheral splenic hypodensity may represent an infarct or mass. Small right and moderate left pleural effusions with underlying pleural thickening on the left; consider cytologic correlation, as lymphomatous involvement is possible.  10/18/22 - CT A/P - Diffuse bulky para-aortic, periportal, and mesenteric lymphadenopathy. Bilateral bulky inguinal and iliac chain lymphadenopathy. For reference: * Pleural LN measures 5.4 x 4.5 cm. * Right inguinal LN measures 3.6 x 1.8 cm. * Confluent RPLAD surrounding the aorta measures 9.3 x 6.2 cm.  10/21/22 - PET/CT -  Large  and markedly avid conglomerate adenopathy in the neck, chest, abdomen, and pelvis ( SUV ranges 3-5) . Large loculated  left pleural effusion and small R pleural effusion.   Hypermetabolic 1 cm cutaneous lesion in the right chest wall. Recommend clinical inspection as neoplasm could have this appearance.  10/20/22 - Labs with elevated WBC 15K, Peripheral blood flow cytometry: Monotypic B-cells (10%) positive for Kappa, CD19, CD20, CD10, negative for CD5, , CD23.   C/o fatigue. Weight  loss as above . No pain. No fevers . No night sweats.   11/4/22 R axillary  LN biopsy :  follicular lymphoma grade 1.  L thoracentesis 11/4/22   Rituxan weekly x 4  11/30/22- 12/14/22   Hep B core positive. started on Entecavir antiviral prophylaxis  PET 1/14/23 : compared to Oct 2022- no response - bulky FERNANDO and L pleural effusion no change. Evidence of splenic and small bowel involvement.   4/19/23 Started bendamustine ( reduced dose )/ rituxan    Immediate clinical response- palpable adenopathy resolving.   Cycle 4 of bend/ Rituxan on 7/12/23   PET 8/19/23  post 4 cycles :   good respoonse . IMPRESSION: Since prior FDG-PET/CT scan 1/14/2023: 1.  Response to therapy with significant involution of previously seen bulky hypermetabolic nodes above and below the diaphragm, which are now globally much smaller, with mild uptake relative to background, in most cases less than or similar to blood pool activity. Deauville 2. 2.  Resolution of previously seen splenomegaly. Mild activity at suspected residual calcification is likely due to attenuation correction artifact. 3.  Loculated LEFT pleural effusion is now smaller. Small to moderate RIGHT pleural effusion is grossly stable. 4.  Previously seen RIGHT chest wall nodule has largely resolved.  Did not continue chemo - had ongoing weight loss due to poor taste on chemo- stopped after 4 cycles. Monitored.   [de-identified] : Complains of not being able to gain weight. very upset about it.  No other GI sx, States that he is eating a lot . Per son- he is eating but sometimes would not finish his meal. C/o feeling weak. No rashes No pulm sx

## 2023-12-18 NOTE — RESULTS/DATA
[FreeTextEntry1] : CT CAP 12/15/23 mildly enlarged pretracheal and right hilar nodes are stable since 9/1/2023. Stable nonenlarged axillary lymph nodes since 9/1/2023, which are markedly decreased from the significant bilateral axillary lymphadenopathy on 10/8/2022. Normal size spleen, previously enlarged on 3/7/2023 CT abdomen and pelvis. Marked decrease in the abdominal, pelvic and inguinal lymphadenopathy since 3/7/2023. Small bilateral pleural effusions, previously small to moderate. Scattered 2 to 3 mm pulmonary nodules, nonspecific.

## 2023-12-18 NOTE — REASON FOR VISIT
[Follow-Up Visit] : a follow-up visit for [Family Member] : family member [FreeTextEntry2] : follicular lymphoma grade 1 s/p  bendamustine/ rituxan

## 2023-12-18 NOTE — REVIEW OF SYSTEMS
[Diarrhea: Grade 0] : Diarrhea: Grade 0 [Negative] : Allergic/Immunologic [FreeTextEntry2] : per HPI

## 2023-12-19 DIAGNOSIS — R63.4 ABNORMAL WEIGHT LOSS: ICD-10-CM

## 2023-12-19 DIAGNOSIS — D72.10 EOSINOPHILIA, UNSPECIFIED: ICD-10-CM

## 2023-12-19 LAB
ALBUMIN SERPL ELPH-MCNC: 4.5 G/DL — SIGNIFICANT CHANGE UP (ref 3.3–5)
ALBUMIN SERPL ELPH-MCNC: 4.5 G/DL — SIGNIFICANT CHANGE UP (ref 3.3–5)
ALP SERPL-CCNC: 112 U/L — SIGNIFICANT CHANGE UP (ref 40–120)
ALP SERPL-CCNC: 112 U/L — SIGNIFICANT CHANGE UP (ref 40–120)
ALT FLD-CCNC: 9 U/L — LOW (ref 10–45)
ALT FLD-CCNC: 9 U/L — LOW (ref 10–45)
ANION GAP SERPL CALC-SCNC: 14 MMOL/L — SIGNIFICANT CHANGE UP (ref 5–17)
ANION GAP SERPL CALC-SCNC: 14 MMOL/L — SIGNIFICANT CHANGE UP (ref 5–17)
AST SERPL-CCNC: 12 U/L — SIGNIFICANT CHANGE UP (ref 10–40)
AST SERPL-CCNC: 12 U/L — SIGNIFICANT CHANGE UP (ref 10–40)
BILIRUB SERPL-MCNC: 0.4 MG/DL — SIGNIFICANT CHANGE UP (ref 0.2–1.2)
BILIRUB SERPL-MCNC: 0.4 MG/DL — SIGNIFICANT CHANGE UP (ref 0.2–1.2)
BUN SERPL-MCNC: 16 MG/DL — SIGNIFICANT CHANGE UP (ref 7–23)
BUN SERPL-MCNC: 16 MG/DL — SIGNIFICANT CHANGE UP (ref 7–23)
CALCIUM SERPL-MCNC: 9.9 MG/DL — SIGNIFICANT CHANGE UP (ref 8.4–10.5)
CALCIUM SERPL-MCNC: 9.9 MG/DL — SIGNIFICANT CHANGE UP (ref 8.4–10.5)
CHLORIDE SERPL-SCNC: 105 MMOL/L — SIGNIFICANT CHANGE UP (ref 96–108)
CHLORIDE SERPL-SCNC: 105 MMOL/L — SIGNIFICANT CHANGE UP (ref 96–108)
CO2 SERPL-SCNC: 26 MMOL/L — SIGNIFICANT CHANGE UP (ref 22–31)
CO2 SERPL-SCNC: 26 MMOL/L — SIGNIFICANT CHANGE UP (ref 22–31)
CREAT SERPL-MCNC: 0.85 MG/DL — SIGNIFICANT CHANGE UP (ref 0.5–1.3)
CREAT SERPL-MCNC: 0.85 MG/DL — SIGNIFICANT CHANGE UP (ref 0.5–1.3)
EGFR: 87 ML/MIN/1.73M2 — SIGNIFICANT CHANGE UP
EGFR: 87 ML/MIN/1.73M2 — SIGNIFICANT CHANGE UP
FOLATE SERPL-MCNC: 7.1 NG/ML — SIGNIFICANT CHANGE UP
FOLATE SERPL-MCNC: 7.1 NG/ML — SIGNIFICANT CHANGE UP
GLUCOSE SERPL-MCNC: 120 MG/DL — HIGH (ref 70–99)
GLUCOSE SERPL-MCNC: 120 MG/DL — HIGH (ref 70–99)
IGE SERPL-ACNC: 561 KU/L — HIGH
IGE SERPL-ACNC: 561 KU/L — HIGH
IGG4 SER-MCNC: 84.5 MG/DL — SIGNIFICANT CHANGE UP (ref 1–123)
IGG4 SER-MCNC: 84.5 MG/DL — SIGNIFICANT CHANGE UP (ref 1–123)
POTASSIUM SERPL-MCNC: 5.1 MMOL/L — SIGNIFICANT CHANGE UP (ref 3.5–5.3)
POTASSIUM SERPL-MCNC: 5.1 MMOL/L — SIGNIFICANT CHANGE UP (ref 3.5–5.3)
POTASSIUM SERPL-SCNC: 5.1 MMOL/L — SIGNIFICANT CHANGE UP (ref 3.5–5.3)
POTASSIUM SERPL-SCNC: 5.1 MMOL/L — SIGNIFICANT CHANGE UP (ref 3.5–5.3)
PROT SERPL-MCNC: 7.6 G/DL — SIGNIFICANT CHANGE UP (ref 6–8.3)
PROT SERPL-MCNC: 7.6 G/DL — SIGNIFICANT CHANGE UP (ref 6–8.3)
SODIUM SERPL-SCNC: 145 MMOL/L — SIGNIFICANT CHANGE UP (ref 135–145)
SODIUM SERPL-SCNC: 145 MMOL/L — SIGNIFICANT CHANGE UP (ref 135–145)
T4 FREE+ TSH PNL SERPL: 1.38 UIU/ML — SIGNIFICANT CHANGE UP (ref 0.27–4.2)
T4 FREE+ TSH PNL SERPL: 1.38 UIU/ML — SIGNIFICANT CHANGE UP (ref 0.27–4.2)
TRYPTASE SERPL-MCNC: 6.8 UG/L — SIGNIFICANT CHANGE UP
TRYPTASE SERPL-MCNC: 6.8 UG/L — SIGNIFICANT CHANGE UP
VIT B12 SERPL-MCNC: 585 PG/ML — SIGNIFICANT CHANGE UP (ref 232–1245)
VIT B12 SERPL-MCNC: 585 PG/ML — SIGNIFICANT CHANGE UP (ref 232–1245)

## 2023-12-26 ENCOUNTER — LABORATORY RESULT (OUTPATIENT)
Age: 83
End: 2023-12-26

## 2023-12-27 ENCOUNTER — EMERGENCY (EMERGENCY)
Facility: HOSPITAL | Age: 83
LOS: 0 days | Discharge: HOME | End: 2023-12-27
Attending: EMERGENCY MEDICINE
Payer: MEDICARE

## 2023-12-27 VITALS
OXYGEN SATURATION: 100 % | TEMPERATURE: 98 F | WEIGHT: 87.96 LBS | HEART RATE: 70 BPM | SYSTOLIC BLOOD PRESSURE: 113 MMHG | HEIGHT: 60 IN | RESPIRATION RATE: 17 BRPM | DIASTOLIC BLOOD PRESSURE: 54 MMHG

## 2023-12-27 DIAGNOSIS — Z87.891 PERSONAL HISTORY OF NICOTINE DEPENDENCE: ICD-10-CM
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
## 2023-12-27 DIAGNOSIS — S00.01XA ABRASION OF SCALP, INITIAL ENCOUNTER: ICD-10-CM

## 2023-12-27 DIAGNOSIS — S06.0X0A CONCUSSION WITHOUT LOSS OF CONSCIOUSNESS, INITIAL ENCOUNTER: ICD-10-CM

## 2023-12-27 DIAGNOSIS — R51.9 HEADACHE, UNSPECIFIED: ICD-10-CM

## 2023-12-27 DIAGNOSIS — Z98.890 OTHER SPECIFIED POSTPROCEDURAL STATES: Chronic | ICD-10-CM
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
## 2023-12-27 PROCEDURE — 70450 CT HEAD/BRAIN W/O DYE: CPT | Mod: 26,MG

## 2023-12-27 PROCEDURE — 99284 EMERGENCY DEPT VISIT MOD MDM: CPT

## 2023-12-27 PROCEDURE — G1004: CPT

## 2023-12-27 PROCEDURE — 70450 CT HEAD/BRAIN W/O DYE: CPT | Mod: MG

## 2023-12-27 PROCEDURE — 99284 EMERGENCY DEPT VISIT MOD MDM: CPT | Mod: 25

## 2023-12-27 PROCEDURE — 72125 CT NECK SPINE W/O DYE: CPT | Mod: 26,MG

## 2023-12-27 PROCEDURE — 72125 CT NECK SPINE W/O DYE: CPT | Mod: MG

## 2023-12-27 NOTE — ED PROVIDER NOTE - OBJECTIVE STATEMENT
Patient presents from home after mechanical fall out of bed positive occipital abrasion mild generalized achy nondraining headache no vision changes.  No blood thinners.  No chest pain or shortness of breath.  No abdominal pain.  Acting appropriate per family.  No other acute issues symptoms or concerns

## 2023-12-27 NOTE — ED PROVIDER NOTE - CLINICAL SUMMARY MEDICAL DECISION MAKING FREE TEXT BOX
If head CT negative patient appears in no acute distress neuro intact no blood thinners wound care precautions given advised acetaminophen for pain return to ED if worse

## 2023-12-27 NOTE — ED ADULT NURSE NOTE - NSFALLRISKINTERV_ED_ALL_ED
Assistance OOB with selected safe patient handling equipment if applicable/Assistance with ambulation/Communicate fall risk and risk factors to all staff, patient, and family/Provide visual cue: yellow wristband, yellow gown, etc/Reinforce activity limits and safety measures with patient and family/Call bell, personal items and telephone in reach/Instruct patient to call for assistance before getting out of bed/chair/stretcher/Non-slip footwear applied when patient is off stretcher/[place] to call system/Physically safe environment - no spills, clutter or unnecessary equipment/Purposeful Proactive Rounding/Room/bathroom lighting operational, light cord in reach Assistance OOB with selected safe patient handling equipment if applicable/Assistance with ambulation/Communicate fall risk and risk factors to all staff, patient, and family/Provide visual cue: yellow wristband, yellow gown, etc/Reinforce activity limits and safety measures with patient and family/Call bell, personal items and telephone in reach/Instruct patient to call for assistance before getting out of bed/chair/stretcher/Non-slip footwear applied when patient is off stretcher/[place] to call system/Physically safe environment - no spills, clutter or unnecessary equipment/Purposeful Proactive Rounding/Room/bathroom lighting operational, light cord in reach

## 2023-12-27 NOTE — ED ADULT NURSE NOTE - CAS TRG GENERAL AIRWAY, MLM
Patient: Deneen Hyde    Procedure: Procedure(s):  COLONOSCOPY       Anesthesia Type:  General    Note:  Disposition: Outpatient   Postop Pain Control: Uneventful            Sign Out: Well controlled pain   PONV: No   Neuro/Psych: Uneventful            Sign Out: Acceptable/Baseline neuro status   Airway/Respiratory: Uneventful            Sign Out: Acceptable/Baseline resp. status   CV/Hemodynamics: Uneventful            Sign Out: Acceptable CV status; No obvious hypovolemia; No obvious fluid overload   Other NRE: NONE   DID A NON-ROUTINE EVENT OCCUR? No           Last vitals:  Vitals Value Taken Time   BP 95/61 03/07/22 0814   Temp     Pulse 71 03/07/22 0812   Resp 16 03/07/22 0814   SpO2 95 % 03/07/22 0815   Vitals shown include unvalidated device data.    Electronically Signed By: GIOVANI Oro CRNA  March 7, 2022  8:15 AM   Patent

## 2023-12-27 NOTE — ED ADULT NURSE NOTE - OBJECTIVE STATEMENT
pt presents to ED s/p fall backwards. pt hit back of head; laceration noted; bleeding controlled. pt denies AC use. pt is a&o x3. pt denies pain. pt denies sob. pt has no further complaints.

## 2023-12-27 NOTE — ED PROVIDER NOTE - IV ALTEPLASE DOOR HIDDEN
Glaucoma Suspect OU :  Patient is considered Low Risk. Condition was discussed with patient and patient understands. Will continue to monitor patient for any progression in condition. Patient was advised to call us with any problems questions or concerns. show

## 2023-12-27 NOTE — ED ADULT TRIAGE NOTE - CHIEF COMPLAINT QUOTE
patient c/o head pain s/p mechanical fall.  denies LOC, denies anticoagulants.  small abrasion noted to back of head. patient evaluated by Dr. Moreno, no neuro alert.

## 2023-12-27 NOTE — ED PROVIDER NOTE - HOW PATIENT ADDRESSED, PROFILE
Lukas Dermal Autograft Text: The defect edges were debeveled with a #15 scalpel blade.  Given the location of the defect, shape of the defect and the proximity to free margins a dermal autograft was deemed most appropriate.  Using a sterile surgical marker, the primary defect shape was transferred to the donor site. The area thus outlined was incised deep to adipose tissue with a #15 scalpel blade.  The harvested graft was then trimmed of adipose and epidermal tissue until only dermis was left.  The skin graft was then placed in the primary defect and oriented appropriately.

## 2023-12-27 NOTE — ED PROVIDER NOTE - PATIENT PORTAL LINK FT
You can access the FollowMyHealth Patient Portal offered by [facility] by registering at the following website: [email or web address]. By joining [facility]’s FollowMyHealth portal, you will also be able to view your health information using other applications (apps) compatible with our system. You can access the FollowMyHealth Patient Portal offered by [facility] by registering at the following website: [email or web address]. By joining [facility]’s FollowMyHealth portal, you will also be able to view your health information using other applications (apps) compatible with our system.

## 2023-12-27 NOTE — ED PROVIDER NOTE - NEUROLOGICAL, MLM
Alert and oriented, no focal deficits, no motor or sensory deficits. all other ROS negative except as per HPI

## 2024-01-05 ENCOUNTER — EMERGENCY (EMERGENCY)
Facility: HOSPITAL | Age: 84
LOS: 0 days | Discharge: LEFT BEFORE DISCHARGE | End: 2024-01-05
Attending: EMERGENCY MEDICINE
Payer: MEDICARE

## 2024-01-05 VITALS
SYSTOLIC BLOOD PRESSURE: 106 MMHG | DIASTOLIC BLOOD PRESSURE: 53 MMHG | TEMPERATURE: 99 F | OXYGEN SATURATION: 97 % | HEIGHT: 62 IN | HEART RATE: 66 BPM | WEIGHT: 100.09 LBS | RESPIRATION RATE: 16 BRPM

## 2024-01-05 VITALS
RESPIRATION RATE: 18 BRPM | DIASTOLIC BLOOD PRESSURE: 54 MMHG | HEART RATE: 88 BPM | OXYGEN SATURATION: 100 % | SYSTOLIC BLOOD PRESSURE: 116 MMHG | TEMPERATURE: 99 F

## 2024-01-05 DIAGNOSIS — Z87.891 PERSONAL HISTORY OF NICOTINE DEPENDENCE: ICD-10-CM

## 2024-01-05 DIAGNOSIS — N40.0 BENIGN PROSTATIC HYPERPLASIA WITHOUT LOWER URINARY TRACT SYMPTOMS: ICD-10-CM

## 2024-01-05 DIAGNOSIS — Z86.19 PERSONAL HISTORY OF OTHER INFECTIOUS AND PARASITIC DISEASES: ICD-10-CM

## 2024-01-05 DIAGNOSIS — E78.5 HYPERLIPIDEMIA, UNSPECIFIED: ICD-10-CM

## 2024-01-05 DIAGNOSIS — R55 SYNCOPE AND COLLAPSE: ICD-10-CM

## 2024-01-05 DIAGNOSIS — Z85.72 PERSONAL HISTORY OF NON-HODGKIN LYMPHOMAS: ICD-10-CM

## 2024-01-05 DIAGNOSIS — D64.9 ANEMIA, UNSPECIFIED: ICD-10-CM

## 2024-01-05 DIAGNOSIS — I35.0 NONRHEUMATIC AORTIC (VALVE) STENOSIS: ICD-10-CM

## 2024-01-05 DIAGNOSIS — Z98.890 OTHER SPECIFIED POSTPROCEDURAL STATES: Chronic | ICD-10-CM

## 2024-01-05 LAB
ALBUMIN SERPL ELPH-MCNC: 3.3 G/DL — SIGNIFICANT CHANGE UP (ref 3.3–5)
ALBUMIN SERPL ELPH-MCNC: 3.3 G/DL — SIGNIFICANT CHANGE UP (ref 3.3–5)
ALP SERPL-CCNC: 103 U/L — SIGNIFICANT CHANGE UP (ref 40–120)
ALP SERPL-CCNC: 103 U/L — SIGNIFICANT CHANGE UP (ref 40–120)
ALT FLD-CCNC: 23 U/L — SIGNIFICANT CHANGE UP (ref 12–78)
ALT FLD-CCNC: 23 U/L — SIGNIFICANT CHANGE UP (ref 12–78)
ANION GAP SERPL CALC-SCNC: 2 MMOL/L — LOW (ref 5–17)
ANION GAP SERPL CALC-SCNC: 2 MMOL/L — LOW (ref 5–17)
APPEARANCE UR: CLEAR — SIGNIFICANT CHANGE UP
APPEARANCE UR: CLEAR — SIGNIFICANT CHANGE UP
AST SERPL-CCNC: 10 U/L — LOW (ref 15–37)
AST SERPL-CCNC: 10 U/L — LOW (ref 15–37)
BASOPHILS # BLD AUTO: 0.13 K/UL — SIGNIFICANT CHANGE UP (ref 0–0.2)
BASOPHILS # BLD AUTO: 0.13 K/UL — SIGNIFICANT CHANGE UP (ref 0–0.2)
BASOPHILS NFR BLD AUTO: 2 % — SIGNIFICANT CHANGE UP (ref 0–2)
BASOPHILS NFR BLD AUTO: 2 % — SIGNIFICANT CHANGE UP (ref 0–2)
BILIRUB SERPL-MCNC: 0.3 MG/DL — SIGNIFICANT CHANGE UP (ref 0.2–1.2)
BILIRUB SERPL-MCNC: 0.3 MG/DL — SIGNIFICANT CHANGE UP (ref 0.2–1.2)
BILIRUB UR-MCNC: NEGATIVE — SIGNIFICANT CHANGE UP
BILIRUB UR-MCNC: NEGATIVE — SIGNIFICANT CHANGE UP
BUN SERPL-MCNC: 18 MG/DL — SIGNIFICANT CHANGE UP (ref 7–23)
BUN SERPL-MCNC: 18 MG/DL — SIGNIFICANT CHANGE UP (ref 7–23)
CALCIUM SERPL-MCNC: 8.9 MG/DL — SIGNIFICANT CHANGE UP (ref 8.5–10.1)
CALCIUM SERPL-MCNC: 8.9 MG/DL — SIGNIFICANT CHANGE UP (ref 8.5–10.1)
CHLORIDE SERPL-SCNC: 109 MMOL/L — HIGH (ref 96–108)
CHLORIDE SERPL-SCNC: 109 MMOL/L — HIGH (ref 96–108)
CO2 SERPL-SCNC: 31 MMOL/L — SIGNIFICANT CHANGE UP (ref 22–31)
CO2 SERPL-SCNC: 31 MMOL/L — SIGNIFICANT CHANGE UP (ref 22–31)
COLOR SPEC: YELLOW — SIGNIFICANT CHANGE UP
COLOR SPEC: YELLOW — SIGNIFICANT CHANGE UP
CREAT SERPL-MCNC: 0.86 MG/DL — SIGNIFICANT CHANGE UP (ref 0.5–1.3)
CREAT SERPL-MCNC: 0.86 MG/DL — SIGNIFICANT CHANGE UP (ref 0.5–1.3)
DIFF PNL FLD: NEGATIVE — SIGNIFICANT CHANGE UP
DIFF PNL FLD: NEGATIVE — SIGNIFICANT CHANGE UP
EGFR: 87 ML/MIN/1.73M2 — SIGNIFICANT CHANGE UP
EGFR: 87 ML/MIN/1.73M2 — SIGNIFICANT CHANGE UP
EOSINOPHIL # BLD AUTO: 2.42 K/UL — HIGH (ref 0–0.5)
EOSINOPHIL # BLD AUTO: 2.42 K/UL — HIGH (ref 0–0.5)
EOSINOPHIL NFR BLD AUTO: 36 % — HIGH (ref 0–6)
EOSINOPHIL NFR BLD AUTO: 36 % — HIGH (ref 0–6)
GLUCOSE SERPL-MCNC: 112 MG/DL — HIGH (ref 70–99)
GLUCOSE SERPL-MCNC: 112 MG/DL — HIGH (ref 70–99)
GLUCOSE UR QL: NEGATIVE MG/DL — SIGNIFICANT CHANGE UP
GLUCOSE UR QL: NEGATIVE MG/DL — SIGNIFICANT CHANGE UP
HCT VFR BLD CALC: 35.7 % — LOW (ref 39–50)
HCT VFR BLD CALC: 35.7 % — LOW (ref 39–50)
HGB BLD-MCNC: 11.8 G/DL — LOW (ref 13–17)
HGB BLD-MCNC: 11.8 G/DL — LOW (ref 13–17)
KETONES UR-MCNC: NEGATIVE MG/DL — SIGNIFICANT CHANGE UP
KETONES UR-MCNC: NEGATIVE MG/DL — SIGNIFICANT CHANGE UP
LEUKOCYTE ESTERASE UR-ACNC: NEGATIVE — SIGNIFICANT CHANGE UP
LEUKOCYTE ESTERASE UR-ACNC: NEGATIVE — SIGNIFICANT CHANGE UP
LYMPHOCYTES # BLD AUTO: 1.48 K/UL — SIGNIFICANT CHANGE UP (ref 1–3.3)
LYMPHOCYTES # BLD AUTO: 1.48 K/UL — SIGNIFICANT CHANGE UP (ref 1–3.3)
LYMPHOCYTES # BLD AUTO: 22 % — SIGNIFICANT CHANGE UP (ref 13–44)
LYMPHOCYTES # BLD AUTO: 22 % — SIGNIFICANT CHANGE UP (ref 13–44)
MAGNESIUM SERPL-MCNC: 2.4 MG/DL — SIGNIFICANT CHANGE UP (ref 1.6–2.6)
MAGNESIUM SERPL-MCNC: 2.4 MG/DL — SIGNIFICANT CHANGE UP (ref 1.6–2.6)
MANUAL SMEAR VERIFICATION: SIGNIFICANT CHANGE UP
MANUAL SMEAR VERIFICATION: SIGNIFICANT CHANGE UP
MCHC RBC-ENTMCNC: 29.5 PG — SIGNIFICANT CHANGE UP (ref 27–34)
MCHC RBC-ENTMCNC: 29.5 PG — SIGNIFICANT CHANGE UP (ref 27–34)
MCHC RBC-ENTMCNC: 33.1 GM/DL — SIGNIFICANT CHANGE UP (ref 32–36)
MCHC RBC-ENTMCNC: 33.1 GM/DL — SIGNIFICANT CHANGE UP (ref 32–36)
MCV RBC AUTO: 89.3 FL — SIGNIFICANT CHANGE UP (ref 80–100)
MCV RBC AUTO: 89.3 FL — SIGNIFICANT CHANGE UP (ref 80–100)
METAMYELOCYTES # FLD: 2 % — HIGH (ref 0–0)
METAMYELOCYTES # FLD: 2 % — HIGH (ref 0–0)
MONOCYTES # BLD AUTO: 0.4 K/UL — SIGNIFICANT CHANGE UP (ref 0–0.9)
MONOCYTES # BLD AUTO: 0.4 K/UL — SIGNIFICANT CHANGE UP (ref 0–0.9)
MONOCYTES NFR BLD AUTO: 6 % — SIGNIFICANT CHANGE UP (ref 2–14)
MONOCYTES NFR BLD AUTO: 6 % — SIGNIFICANT CHANGE UP (ref 2–14)
NEUTROPHILS # BLD AUTO: 2.15 K/UL — SIGNIFICANT CHANGE UP (ref 1.8–7.4)
NEUTROPHILS # BLD AUTO: 2.15 K/UL — SIGNIFICANT CHANGE UP (ref 1.8–7.4)
NEUTROPHILS NFR BLD AUTO: 29 % — LOW (ref 43–77)
NEUTROPHILS NFR BLD AUTO: 29 % — LOW (ref 43–77)
NEUTS BAND # BLD: 3 % — SIGNIFICANT CHANGE UP (ref 0–8)
NEUTS BAND # BLD: 3 % — SIGNIFICANT CHANGE UP (ref 0–8)
NITRITE UR-MCNC: NEGATIVE — SIGNIFICANT CHANGE UP
NITRITE UR-MCNC: NEGATIVE — SIGNIFICANT CHANGE UP
NRBC # BLD: 0 /100 WBCS — SIGNIFICANT CHANGE UP (ref 0–0)
NRBC # BLD: 0 /100 WBCS — SIGNIFICANT CHANGE UP (ref 0–0)
NRBC # BLD: SIGNIFICANT CHANGE UP /100 WBCS (ref 0–0)
NRBC # BLD: SIGNIFICANT CHANGE UP /100 WBCS (ref 0–0)
PH UR: 6.5 — SIGNIFICANT CHANGE UP (ref 5–8)
PH UR: 6.5 — SIGNIFICANT CHANGE UP (ref 5–8)
PLAT MORPH BLD: NORMAL — SIGNIFICANT CHANGE UP
PLAT MORPH BLD: NORMAL — SIGNIFICANT CHANGE UP
PLATELET # BLD AUTO: 226 K/UL — SIGNIFICANT CHANGE UP (ref 150–400)
PLATELET # BLD AUTO: 226 K/UL — SIGNIFICANT CHANGE UP (ref 150–400)
POTASSIUM SERPL-MCNC: 3.7 MMOL/L — SIGNIFICANT CHANGE UP (ref 3.5–5.3)
POTASSIUM SERPL-MCNC: 3.7 MMOL/L — SIGNIFICANT CHANGE UP (ref 3.5–5.3)
POTASSIUM SERPL-SCNC: 3.7 MMOL/L — SIGNIFICANT CHANGE UP (ref 3.5–5.3)
POTASSIUM SERPL-SCNC: 3.7 MMOL/L — SIGNIFICANT CHANGE UP (ref 3.5–5.3)
PROT SERPL-MCNC: 6.9 GM/DL — SIGNIFICANT CHANGE UP (ref 6–8.3)
PROT SERPL-MCNC: 6.9 GM/DL — SIGNIFICANT CHANGE UP (ref 6–8.3)
PROT UR-MCNC: NEGATIVE MG/DL — SIGNIFICANT CHANGE UP
PROT UR-MCNC: NEGATIVE MG/DL — SIGNIFICANT CHANGE UP
RBC # BLD: 4 M/UL — LOW (ref 4.2–5.8)
RBC # BLD: 4 M/UL — LOW (ref 4.2–5.8)
RBC # FLD: 14.1 % — SIGNIFICANT CHANGE UP (ref 10.3–14.5)
RBC # FLD: 14.1 % — SIGNIFICANT CHANGE UP (ref 10.3–14.5)
RBC BLD AUTO: NORMAL — SIGNIFICANT CHANGE UP
RBC BLD AUTO: NORMAL — SIGNIFICANT CHANGE UP
SODIUM SERPL-SCNC: 142 MMOL/L — SIGNIFICANT CHANGE UP (ref 135–145)
SODIUM SERPL-SCNC: 142 MMOL/L — SIGNIFICANT CHANGE UP (ref 135–145)
SP GR SPEC: 1.02 — SIGNIFICANT CHANGE UP (ref 1–1.03)
SP GR SPEC: 1.02 — SIGNIFICANT CHANGE UP (ref 1–1.03)
TROPONIN I, HIGH SENSITIVITY RESULT: 14.25 NG/L — SIGNIFICANT CHANGE UP
TROPONIN I, HIGH SENSITIVITY RESULT: 14.25 NG/L — SIGNIFICANT CHANGE UP
TROPONIN I, HIGH SENSITIVITY RESULT: 16.92 NG/L — SIGNIFICANT CHANGE UP
TROPONIN I, HIGH SENSITIVITY RESULT: 16.92 NG/L — SIGNIFICANT CHANGE UP
UROBILINOGEN FLD QL: 1 MG/DL — SIGNIFICANT CHANGE UP (ref 0.2–1)
UROBILINOGEN FLD QL: 1 MG/DL — SIGNIFICANT CHANGE UP (ref 0.2–1)
WBC # BLD: 6.73 K/UL — SIGNIFICANT CHANGE UP (ref 3.8–10.5)
WBC # BLD: 6.73 K/UL — SIGNIFICANT CHANGE UP (ref 3.8–10.5)
WBC # FLD AUTO: 6.73 K/UL — SIGNIFICANT CHANGE UP (ref 3.8–10.5)
WBC # FLD AUTO: 6.73 K/UL — SIGNIFICANT CHANGE UP (ref 3.8–10.5)

## 2024-01-05 PROCEDURE — 99285 EMERGENCY DEPT VISIT HI MDM: CPT | Mod: 25

## 2024-01-05 PROCEDURE — 87086 URINE CULTURE/COLONY COUNT: CPT

## 2024-01-05 PROCEDURE — 85025 COMPLETE CBC W/AUTO DIFF WBC: CPT

## 2024-01-05 PROCEDURE — 93306 TTE W/DOPPLER COMPLETE: CPT | Mod: 26

## 2024-01-05 PROCEDURE — 99223 1ST HOSP IP/OBS HIGH 75: CPT

## 2024-01-05 PROCEDURE — 71045 X-RAY EXAM CHEST 1 VIEW: CPT | Mod: 26

## 2024-01-05 PROCEDURE — 99285 EMERGENCY DEPT VISIT HI MDM: CPT

## 2024-01-05 PROCEDURE — 80053 COMPREHEN METABOLIC PANEL: CPT

## 2024-01-05 PROCEDURE — 81003 URINALYSIS AUTO W/O SCOPE: CPT

## 2024-01-05 PROCEDURE — 83735 ASSAY OF MAGNESIUM: CPT

## 2024-01-05 PROCEDURE — 84484 ASSAY OF TROPONIN QUANT: CPT

## 2024-01-05 PROCEDURE — 71045 X-RAY EXAM CHEST 1 VIEW: CPT

## 2024-01-05 PROCEDURE — 93306 TTE W/DOPPLER COMPLETE: CPT

## 2024-01-05 PROCEDURE — 93005 ELECTROCARDIOGRAM TRACING: CPT

## 2024-01-05 PROCEDURE — 93010 ELECTROCARDIOGRAM REPORT: CPT

## 2024-01-05 PROCEDURE — 36415 COLL VENOUS BLD VENIPUNCTURE: CPT

## 2024-01-05 RX ORDER — ONDANSETRON 8 MG/1
4 TABLET, FILM COATED ORAL EVERY 6 HOURS
Refills: 0 | Status: DISCONTINUED | OUTPATIENT
Start: 2024-01-05 | End: 2024-01-05

## 2024-01-05 RX ORDER — TAMSULOSIN HYDROCHLORIDE 0.4 MG/1
0.4 CAPSULE ORAL AT BEDTIME
Refills: 0 | Status: DISCONTINUED | OUTPATIENT
Start: 2024-01-05 | End: 2024-01-05

## 2024-01-05 RX ORDER — ENTECAVIR 0.5 MG/1
0.5 TABLET ORAL DAILY
Refills: 0 | Status: DISCONTINUED | OUTPATIENT
Start: 2024-01-05 | End: 2024-01-05

## 2024-01-05 RX ORDER — ATORVASTATIN CALCIUM 80 MG/1
1 TABLET, FILM COATED ORAL
Qty: 0 | Refills: 0 | DISCHARGE

## 2024-01-05 RX ORDER — ACYCLOVIR SODIUM 500 MG
1 VIAL (EA) INTRAVENOUS
Refills: 0 | DISCHARGE

## 2024-01-05 RX ORDER — TAMSULOSIN HYDROCHLORIDE 0.4 MG/1
1 CAPSULE ORAL
Refills: 0 | DISCHARGE

## 2024-01-05 RX ORDER — ACETAMINOPHEN 500 MG
650 TABLET ORAL EVERY 6 HOURS
Refills: 0 | Status: DISCONTINUED | OUTPATIENT
Start: 2024-01-05 | End: 2024-01-05

## 2024-01-05 RX ORDER — LISINOPRIL 2.5 MG/1
5 TABLET ORAL DAILY
Refills: 0 | Status: DISCONTINUED | OUTPATIENT
Start: 2024-01-05 | End: 2024-01-05

## 2024-01-05 RX ORDER — FAMOTIDINE 10 MG/ML
1 INJECTION INTRAVENOUS
Refills: 0 | DISCHARGE

## 2024-01-05 RX ORDER — ENTECAVIR 0.5 MG/1
1 TABLET ORAL
Refills: 0 | DISCHARGE

## 2024-01-05 RX ORDER — SODIUM CHLORIDE 9 MG/ML
500 INJECTION INTRAMUSCULAR; INTRAVENOUS; SUBCUTANEOUS ONCE
Refills: 0 | Status: COMPLETED | OUTPATIENT
Start: 2024-01-05 | End: 2024-01-05

## 2024-01-05 RX ORDER — SOLIFENACIN SUCCINATE 10 MG/1
1 TABLET ORAL
Refills: 0 | DISCHARGE

## 2024-01-05 RX ORDER — FINASTERIDE 5 MG/1
1 TABLET, FILM COATED ORAL
Qty: 0 | Refills: 0 | DISCHARGE

## 2024-01-05 RX ORDER — LISINOPRIL 2.5 MG/1
1 TABLET ORAL
Refills: 0 | DISCHARGE

## 2024-01-05 RX ORDER — PANTOPRAZOLE SODIUM 20 MG/1
40 TABLET, DELAYED RELEASE ORAL
Refills: 0 | Status: DISCONTINUED | OUTPATIENT
Start: 2024-01-05 | End: 2024-01-05

## 2024-01-05 RX ORDER — FINASTERIDE 5 MG/1
5 TABLET, FILM COATED ORAL DAILY
Refills: 0 | Status: DISCONTINUED | OUTPATIENT
Start: 2024-01-05 | End: 2024-01-05

## 2024-01-05 RX ORDER — ATORVASTATIN CALCIUM 80 MG/1
10 TABLET, FILM COATED ORAL AT BEDTIME
Refills: 0 | Status: DISCONTINUED | OUTPATIENT
Start: 2024-01-05 | End: 2024-01-05

## 2024-01-05 RX ORDER — SODIUM CHLORIDE 9 MG/ML
1000 INJECTION INTRAMUSCULAR; INTRAVENOUS; SUBCUTANEOUS
Refills: 0 | Status: COMPLETED | OUTPATIENT
Start: 2024-01-05 | End: 2024-01-05

## 2024-01-05 RX ADMIN — FINASTERIDE 5 MILLIGRAM(S): 5 TABLET, FILM COATED ORAL at 12:35

## 2024-01-05 RX ADMIN — SODIUM CHLORIDE 500 MILLILITER(S): 9 INJECTION INTRAMUSCULAR; INTRAVENOUS; SUBCUTANEOUS at 05:14

## 2024-01-05 RX ADMIN — SODIUM CHLORIDE 125 MILLILITER(S): 9 INJECTION INTRAMUSCULAR; INTRAVENOUS; SUBCUTANEOUS at 13:55

## 2024-01-05 RX ADMIN — ENTECAVIR 0.5 MILLIGRAM(S): 0.5 TABLET ORAL at 12:36

## 2024-01-05 RX ADMIN — LISINOPRIL 5 MILLIGRAM(S): 2.5 TABLET ORAL at 12:34

## 2024-01-05 NOTE — CONSULT NOTE ADULT - PROBLEM SELECTOR RECOMMENDATION 2
will repeat echo as outlined above to clarify severity.   appears compensated from volume standpoint

## 2024-01-05 NOTE — ED ADULT NURSE NOTE - NSFALLRISKINTERV_ED_ALL_ED
Assistance OOB with selected safe patient handling equipment if applicable/Communicate fall risk and risk factors to all staff, patient, and family/Orthostatic vital signs/Provide visual cue: yellow wristband, yellow gown, etc/Reinforce activity limits and safety measures with patient and family/Call bell, personal items and telephone in reach/Instruct patient to call for assistance before getting out of bed/chair/stretcher/Non-slip footwear applied when patient is off stretcher/[place] to call system/Physically safe environment - no spills, clutter or unnecessary equipment/Purposeful Proactive Rounding/Room/bathroom lighting operational, light cord in reach Assistance OOB with selected safe patient handling equipment if applicable/Communicate fall risk and risk factors to all staff, patient, and family/Orthostatic vital signs/Provide visual cue: yellow wristband, yellow gown, etc/Reinforce activity limits and safety measures with patient and family/Call bell, personal items and telephone in reach/Instruct patient to call for assistance before getting out of bed/chair/stretcher/Non-slip footwear applied when patient is off stretcher/[place] to call system/Physically safe environment - no spills, clutter or unnecessary equipment/Purposeful Proactive Rounding/Room/bathroom lighting operational, light cord in reach

## 2024-01-05 NOTE — ED ADULT TRIAGE NOTE - CHIEF COMPLAINT QUOTE
Pt BIBEMS from home c/o near-syncopal episode while walking to the bathroom. Pt states he sat down prior to synopsizing and had no LOC or fall.  PMHx of syncopal episodes while walking to the bathroom at night. Pt denies dizziness, chest pain or sob.

## 2024-01-05 NOTE — H&P ADULT - ASSESSMENT
IMP: 82 yo male with above pmh a/w:    #syncope collapse  # severe aortic stenosis  adm to tele services  last echo in September 2023: Aortic Valve   Significant fibrocalcific changes noted to the aortic valve leaflets with   restriction in leaflet excursion.   Peak and mean transaortic gradients are 33.2 and 18 mmHg respectively;   calculated CHARLY is 1.05cm2, these findings are consistent with severe   aortic stenosis.   Moderate (2+) aortic regurgitation is present.    Dash diet  OOb with assistance only  cardio consult    #HTN  cont lisinopril    #HLD   cont atorvastatin    # Hep B  cont antiviral    #BPH  cont finasteride/flomax    # Vte risk: low    # Adv directives: full code, D/W joselin Joshua IMP: 84 yo male with above pmh a/w:    #syncope collapse  # severe aortic stenosis  adm to tele services  last echo in September 2023: Aortic Valve   Significant fibrocalcific changes noted to the aortic valve leaflets with   restriction in leaflet excursion.   Peak and mean transaortic gradients are 33.2 and 18 mmHg respectively;   calculated CHARLY is 1.05cm2, these findings are consistent with severe   aortic stenosis.   Moderate (2+) aortic regurgitation is present.    Dash diet  OOb with assistance only  cardio consult    #HTN  cont lisinopril    #HLD   cont atorvastatin    # Hep B  cont antiviral    #BPH  cont finasteride/flomax    # Vte risk: low    # Adv directives: full code, D/W joselin Joshua

## 2024-01-05 NOTE — CONSULT NOTE ADULT - SUBJECTIVE AND OBJECTIVE BOX
CHIEF COMPLAINT: syncope       HPI:  Mr. Carcamo is an 82 yo male with a hx lymphoma (completed chemo 2023), BPH, HTN HLD, aortic stenosis presenting after a syncopal episode. Pt unable to provide details of event but denies prodromal chest pain, lightheadedness, SOB, palpitations. Per son, pt had gotten up to use the bathroom. His wife found him down in the bathroom unresponsive for several minutes.     He was seen in the ED twice recently for similar events in 2023 and last week. Both had occurred when he had gotten up to use the bathroom. He follows with cardiologist Dr. Palla. Denies LE edema, shortness of breath, exertional angina, PND, orthopnea.     TTE 2023 reportedly showed LVEF 60%, severe AS, however gradients, velocities and valve area consistent with mild-mod AS. (CHARLY 1.05cm2, mean gradient 18mmHg, peak velocity 2.9m/s)     In the ED, he was afebrile, /54, HR 70.   Labs notable for hgb 11.8, Hs trop neg x2, grossly unremarkable CMP.   CTH neg for acute change.   ECG NSR 68 bpm, no ST-T wave changes or conduction delay     Cardiology consulted for syncope.          PMH: as above  PSH: thoracentesis  FH: mother and father both  from natural causes  SH:  65 year smoker 1/2 ppd, quit when dx with Lymphoma.  No ETOH, No rec drugs (2024 09:20)      PAST MEDICAL / SURGICAL HISTORY:  PAST MEDICAL & SURGICAL HISTORY:  Benign essential HTN      HLD (hyperlipidemia)      Smoker      History of BPH      Lymphoma      H/O abdominal surgery          SOCIAL HISTORY:   Alcohol: Denied  Smoking: Nonsmoker  Drug Use: Denied  Marital Status:     FAMILY HISTORY: FAMILY HISTORY:      MEDICATIONS  (STANDING):  atorvastatin 10 milliGRAM(s) Oral at bedtime  entecavir 0.5 milliGRAM(s) Oral daily  finasteride 5 milliGRAM(s) Oral daily  lisinopril 5 milliGRAM(s) Oral daily  pantoprazole    Tablet 40 milliGRAM(s) Oral before breakfast  sodium chloride 0.9%. 1000 milliLiter(s) (125 mL/Hr) IV Continuous <Continuous>  tamsulosin 0.4 milliGRAM(s) Oral at bedtime    MEDICATIONS  (PRN):  acetaminophen     Tablet .. 650 milliGRAM(s) Oral every 6 hours PRN Mild Pain (1 - 3)  ondansetron Injectable 4 milliGRAM(s) IV Push every 6 hours PRN Nausea and/or Vomiting      Allergies    No Known Allergies    Intolerances        REVIEW OF SYSTEMS:  CONSTITUTIONAL: No weakness, fevers or chills  Eyes: No visual changes  NECK: No pain or stiffness  RESPIRATORY: No cough, wheezing, hemoptysis; No shortness of breath  CARDIOVASCULAR: No chest pain or palpitations  GASTROINTESTINAL: No abdominal pain. No nausea, vomiting, or hematemesis; No diarrhea or constipation. No melena or hematochezia.  GENITOURINARY: No dysuria, frequency or hematuria  NEUROLOGICAL: No numbness. + syncope   SKIN: No itching or rash  All other review of systems is negative unless indicated above    VITAL SIGNS:   Vital Signs Last 24 Hrs  T(C): 36.6 (2024 07:20), Max: 37.2 (2024 04:04)  T(F): 97.9 (2024 07:20), Max: 98.9 (2024 04:04)  HR: 72 (2024 07:20) (66 - 72)  BP: 124/66 (2024 07:20) (106/53 - 124/66)  BP(mean): 79 (2024 07:20) (79 - 79)  RR: 16 (2024 07:20) (16 - 16)  SpO2: 99% (2024 07:20) (97% - 99%)    Parameters below as of 2024 07:20  Patient On (Oxygen Delivery Method): room air        I&O's Summary      PHYSICAL EXAM:  Constitutional: NAD, awake and alert  HEENT:  EOMI,  Pupils round, No oral cyanosis.  Pulmonary: Non-labored, breath sounds are clear bilaterally, No wheezing, rales or rhonchi  Cardiovascular: S1 and S2, regular rate and rhythm, 3/6 WYATT   Gastrointestinal: soft, nontender.   Lymph: No peripheral edema. No cervical lymphadenopathy.  Neurological: Alert, no focal deficits  Skin: No rashes.  Psych:  Mood & affect appropriate    LABS:                        11.8   6.73  )-----------( 226      ( 2024 05:12 )             35.7     2024 05:12    142    |  109    |  18     ----------------------------<  112    3.7     |  31     |  0.86     Ca    8.9        2024 05:12  Mg     2.4       2024 05:12    TPro  6.9    /  Alb  3.3    /  TBili  0.3    /  DBili  x      /  AST  10     /  ALT  23     /  AlkPhos  103    2024 05:12

## 2024-01-05 NOTE — ED PROVIDER NOTE - PROGRESS NOTE DETAILS
JG: Rectal exam performed.  Patient is guaiac negative.     Lot 251 Exp 4/30/24. both boxes negative. QC  check. Nunez, DO: Bedboard states patient cannot be OBS due to insurance, will change to telemetry admission.

## 2024-01-05 NOTE — PHARMACOTHERAPY INTERVENTION NOTE - COMMENTS
Med history complete, reviewed medications and allergies with patients son at bedside and confirmed medication list with doctor first med profile, all medication related questions answered

## 2024-01-05 NOTE — CONSULT NOTE ADULT - PROBLEM SELECTOR RECOMMENDATION 9
pt presenting with syncope; several similar episodes in the past. all occurred while pt using bathroom during the night - may be a vagal component   would monitor on tele for at least 24 hr; check orthostatic vital signs   will repeat echo to assess severity of AS (TTE 9/2023 reportedly showed LVEF 60%, severe AS, however gradients, velocities and valve area consistent with mild-mod AS. (CHARLY 1.05cm2, mean gradient 18mmHg, peak velocity 2.9m/s) unclear if AS contributing factor.    Pt follows with cardiologist, Dr. Palla

## 2024-01-05 NOTE — ED PROVIDER NOTE - CLINICAL SUMMARY MEDICAL DECISION MAKING FREE TEXT BOX
82 YO M WITH HX OF AORTIC STENOSIS AND MULTIPLE EPISODES OF SYNCOPE X 1 MONTH, SYNCOPE THIS MORNING.  NOW WITH WORSENING ANEMIA, EKG AND TROP NORMAL.  WILL ADMIT TO OBS, TELE AND HAVE CARDIOLOGY CONSULT. 84 YO M WITH HX OF AORTIC STENOSIS AND MULTIPLE EPISODES OF SYNCOPE X 1 MONTH, SYNCOPE THIS MORNING.  NOW WITH WORSENING ANEMIA, EKG AND TROP NORMAL.  WILL ADMIT TO OBS, TELE AND HAVE CARDIOLOGY CONSULT.

## 2024-01-05 NOTE — H&P ADULT - HISTORY OF PRESENT ILLNESS
pt is an 82 yo male with PMH; Lymphoma, Grade 1, s/p chemo, follows with Dr Chand, HTN, HLD, BPH, seen in ER today for syncope and collapse ~3am this morning.  Son at bedside, states he lives upstairs from his parents, states he rec'd a callf rom his mom that his father had passed out.  He found his father sitting in the chair with eyes open but not responsive and drooling.  He called an ambulance and when they arrived around 15 mins later his father started to respond. Pt stated he had woken up to use the bathroom, but when he went in there his wife was in there so he sat in a chair to wait for her to come out.  He denies feeling any dizziness or lightheadedness prior to the syncopal episode.  He was brought to the ER in 2023  for syncope, found at that time on echo to have  severe aortic stenosis. EF 60%.  Pt had been recently on chemo and it was felt that dehydration coupled with severe AS was the cause. Of note, pt had an echo in cardio office in 2022, which reported Mild AS. Pt denies any recent  F/C/R, denies any burning on urination. Per son Joshua pt walks independently, can climb a flight of stairs and amb around a store without and SOB.  Presently pt is awake, alert, without c/o dizziness or lightheadedness, no N/V. pt awaiting adm to tele floor.    PMH: as above  PSH: thoracentesis  FH: mother and father both  from natural causes  SH:  67 year smoker 1/2 ppd, quit when dx with Lymphoma.  No ETOH, No rec drugs

## 2024-01-05 NOTE — ED ADULT NURSE NOTE - EXPLANATION OF PATIENT'S REASON FOR LEAVING
as per son, pt will f/u with his cardiologist, md Haley made aware, AMA formed filled out and signed, witnessed.

## 2024-01-05 NOTE — ED ADULT NURSE NOTE - OBJECTIVE STATEMENT
Pt arrived to ED s/p syncope. Pts son at bedside. Pts son states last week he had a syncope episode where he hit his head and was brought to the ER. Pt states everything was cleared and he was sent home and was following up with a cardiologist outpatient. Pts son states he went to the kitchen today and found his father in a chair passed out. Pts son states he did not hit his head and he was unconscious for about 15 minutes. Pts states he was trying to go to the bathroom and then he lost consciousness. Pt AOx4 denies chest pain, sob, headache, dizziness, vison changes, n/v/d, or fevers.

## 2024-01-05 NOTE — H&P ADULT - NS ATTEND AMEND GEN_ALL_CORE FT
Patient seen and examined with MIGUEL ANGEL Edmondson.  I was physically present for the key portions of the evaluation and management (E/M) service provided.  I agree with the above history, physical, and plan which I have reviewed and edited where appropriate.   - pt feeling well now without dizziness cp, sob  cv + s1 s2 RR , WYATT  - case d/w dr garza  - will obtain ECHO today  - monitor on tele

## 2024-01-05 NOTE — ED PROVIDER NOTE - CARE PLAN
1 Principal Discharge DX:	Syncope  Secondary Diagnosis:	Aortic stenosis   Principal Discharge DX:	Syncope  Secondary Diagnosis:	Aortic stenosis  Secondary Diagnosis:	Anemia

## 2024-01-05 NOTE — H&P ADULT - NSHPPHYSICALEXAM_GEN_ALL_CORE
PHYSICAL EXAM:    GENERAL: Comfortable, no acute distress   HEAD:  Normocephalic, atraumatic  EYES: EOMI, PERRLA  HEENT: Moist mucous membranes  NECK: Supple, No JVD  NERVOUS SYSTEM:  Alert & Oriented X3, Motor Strength 5/5 B/L upper and lower extremities  CHEST/LUNG: Clear to auscultation bilaterally, dimished at bases  HEART: Regular rate and rhythm  ABDOMEN: Soft, non tender, slightly distended, Bowel sounds present  GENITOURINARY: Voiding, no palpable bladder  EXTREMITIES:   No clubbing, cyanosis, or edema  MUSCULOSKELETAL- No muscle tenderness, no joint tenderness  SKIN-no rash

## 2024-01-05 NOTE — ED ADULT NURSE NOTE - CAS EDN DISCHARGE INTERVENTIONS
Detail Level: Detailed
Quality 111:Pneumonia Vaccination Status For Older Adults: Pneumococcal Vaccination Previously Received
Quality 265: Biopsy Follow-Up: Biopsy results reviewed, communicated, tracked, and documented
Quality 110: Preventive Care And Screening: Influenza Immunization: Influenza Immunization previously received during influenza season
IV discontinued, cath removed intact

## 2024-01-05 NOTE — ED PROVIDER NOTE - GASTROINTESTINAL, MLM
Abdomen soft, non-tender, no guarding. Abdomen soft, non-tender, no guarding. RECTAL EXAM: normal tone; no hemorrhoids. light brown stool, guaiac negative. +palpable enlarged nontender prostate

## 2024-01-05 NOTE — ED PROVIDER NOTE - OBJECTIVE STATEMENT
Pt is a 82 yo M with hx of aortic stenosis, HTN, BPH, hyperlipidemia, lymphoma presents s/p syncopal episode in the bathroom.  Family states he got up to use the bathroom and then was found slumped over seated.  Patient states he felt dizzy and almost fainted.  Family member found him after fainting with eyes rolled back in seated position.  Patient did not fall down or hit his head.  He has had 2-3 syncopal events which he was told is likely due to aortic stenosis and getting up too quickly.  Denies fever, recent illness, current chest pain, SOB or dizziness. Cards: Palla Pt is a 84 yo M with hx of aortic stenosis, HTN, BPH, hyperlipidemia, lymphoma presents s/p syncopal episode in the bathroom.  Family states he got up to use the bathroom and then was found slumped over seated.  Patient states he felt dizzy and almost fainted.  Family member found him after fainting with eyes rolled back in seated position.  Patient did not fall down or hit his head.  He has had 2-3 syncopal events which he was told is likely due to aortic stenosis and getting up too quickly.  Denies fever, recent illness, current chest pain, SOB or dizziness. Cards: Palla

## 2024-01-05 NOTE — ED ADULT TRIAGE NOTE - NS ED NURSE AMBULANCES
[facility]

## 2024-01-06 LAB
CULTURE RESULTS: SIGNIFICANT CHANGE UP
CULTURE RESULTS: SIGNIFICANT CHANGE UP
SPECIMEN SOURCE: SIGNIFICANT CHANGE UP
SPECIMEN SOURCE: SIGNIFICANT CHANGE UP

## 2024-01-06 NOTE — PROGRESS NOTE ADULT - SUBJECTIVE AND OBJECTIVE BOX
late entry.     spoke with ED rn and pt and son adamant they want to leave. i reviewed with them r/b/a and despite this they want to leave.  he did say he has cardio appt on monday with dr palla and will follow with him which is reassuring. urged him to come back to ED if any s/s of cp, dizzines, syncope.  AM form signed and returned to medical records.

## 2024-01-08 ENCOUNTER — APPOINTMENT (OUTPATIENT)
Dept: CARDIOLOGY | Facility: CLINIC | Age: 84
End: 2024-01-08

## 2024-01-26 ENCOUNTER — OUTPATIENT (OUTPATIENT)
Dept: OUTPATIENT SERVICES | Facility: HOSPITAL | Age: 84
LOS: 1 days | Discharge: HOME | End: 2024-01-26

## 2024-01-26 DIAGNOSIS — Z98.890 OTHER SPECIFIED POSTPROCEDURAL STATES: Chronic | ICD-10-CM

## 2024-01-26 DIAGNOSIS — C82.00 FOLLICULAR LYMPHOMA GRADE I, UNSPECIFIED SITE: ICD-10-CM

## 2024-01-31 ENCOUNTER — RESULT REVIEW (OUTPATIENT)
Age: 84
End: 2024-01-31

## 2024-01-31 ENCOUNTER — LABORATORY RESULT (OUTPATIENT)
Age: 84
End: 2024-01-31

## 2024-01-31 ENCOUNTER — APPOINTMENT (OUTPATIENT)
Dept: HEMATOLOGY ONCOLOGY | Facility: CLINIC | Age: 84
End: 2024-01-31
Payer: MEDICARE

## 2024-01-31 VITALS
BODY MASS INDEX: 17.37 KG/M2 | WEIGHT: 88.5 LBS | RESPIRATION RATE: 17 BRPM | HEART RATE: 90 BPM | HEIGHT: 60 IN | DIASTOLIC BLOOD PRESSURE: 79 MMHG | SYSTOLIC BLOOD PRESSURE: 107 MMHG | TEMPERATURE: 98.4 F | OXYGEN SATURATION: 96 %

## 2024-01-31 DIAGNOSIS — R76.8 OTHER SPECIFIED ABNORMAL IMMUNOLOGICAL FINDINGS IN SERUM: ICD-10-CM

## 2024-01-31 LAB
BASOPHILS # BLD AUTO: 0.1 K/UL — SIGNIFICANT CHANGE UP (ref 0–0.2)
BASOPHILS NFR BLD AUTO: 0.6 % — SIGNIFICANT CHANGE UP (ref 0–2)
EOSINOPHIL # BLD AUTO: 6.2 K/UL — HIGH (ref 0–0.5)
EOSINOPHIL NFR BLD AUTO: 39.7 % — HIGH (ref 0–6)
HCT VFR BLD CALC: 40.6 % — SIGNIFICANT CHANGE UP (ref 39–50)
HGB BLD-MCNC: 13.1 G/DL — SIGNIFICANT CHANGE UP (ref 13–17)
IMM GRANULOCYTES NFR BLD AUTO: 0.4 % — SIGNIFICANT CHANGE UP (ref 0–0.9)
LYMPHOCYTES # BLD AUTO: 13 % — SIGNIFICANT CHANGE UP (ref 13–44)
LYMPHOCYTES # BLD AUTO: 2.03 K/UL — SIGNIFICANT CHANGE UP (ref 1–3.3)
MACROCYTES BLD QL: SLIGHT — SIGNIFICANT CHANGE UP
MCHC RBC-ENTMCNC: 28.8 PG — SIGNIFICANT CHANGE UP (ref 27–34)
MCHC RBC-ENTMCNC: 32.3 GM/DL — SIGNIFICANT CHANGE UP (ref 32–36)
MCV RBC AUTO: 89.2 FL — SIGNIFICANT CHANGE UP (ref 80–100)
MONOCYTES # BLD AUTO: 0.82 K/UL — SIGNIFICANT CHANGE UP (ref 0–0.9)
MONOCYTES NFR BLD AUTO: 5.2 % — SIGNIFICANT CHANGE UP (ref 2–14)
NEUTROPHILS # BLD AUTO: 6.4 K/UL — SIGNIFICANT CHANGE UP (ref 1.8–7.4)
NEUTROPHILS NFR BLD AUTO: 41.1 % — LOW (ref 43–77)
NRBC # BLD: 0 /100 WBCS — SIGNIFICANT CHANGE UP (ref 0–0)
PLAT MORPH BLD: NORMAL — SIGNIFICANT CHANGE UP
PLATELET # BLD AUTO: 302 K/UL — SIGNIFICANT CHANGE UP (ref 150–400)
RBC # BLD: 4.55 M/UL — SIGNIFICANT CHANGE UP (ref 4.2–5.8)
RBC # FLD: 14.5 % — SIGNIFICANT CHANGE UP (ref 10.3–14.5)
RBC BLD AUTO: SIGNIFICANT CHANGE UP
WBC # BLD: 15.62 K/UL — HIGH (ref 3.8–10.5)
WBC # FLD AUTO: 15.62 K/UL — HIGH (ref 3.8–10.5)

## 2024-01-31 PROCEDURE — 99214 OFFICE O/P EST MOD 30 MIN: CPT

## 2024-02-01 DIAGNOSIS — R76.8 OTHER SPECIFIED ABNORMAL IMMUNOLOGICAL FINDINGS IN SERUM: ICD-10-CM

## 2024-02-01 DIAGNOSIS — D72.10 EOSINOPHILIA, UNSPECIFIED: ICD-10-CM

## 2024-02-01 LAB — LDH SERPL L TO P-CCNC: 193 U/L — SIGNIFICANT CHANGE UP (ref 50–242)

## 2024-02-12 ENCOUNTER — NON-APPOINTMENT (OUTPATIENT)
Age: 84
End: 2024-02-12

## 2024-02-12 ENCOUNTER — APPOINTMENT (OUTPATIENT)
Dept: CARDIOLOGY | Facility: CLINIC | Age: 84
End: 2024-02-12
Payer: MEDICARE

## 2024-02-12 VITALS
WEIGHT: 88 LBS | HEIGHT: 60 IN | DIASTOLIC BLOOD PRESSURE: 62 MMHG | BODY MASS INDEX: 17.28 KG/M2 | SYSTOLIC BLOOD PRESSURE: 106 MMHG | OXYGEN SATURATION: 98 % | HEART RATE: 81 BPM

## 2024-02-12 DIAGNOSIS — I35.1 NONRHEUMATIC AORTIC (VALVE) INSUFFICIENCY: ICD-10-CM

## 2024-02-12 PROCEDURE — 99214 OFFICE O/P EST MOD 30 MIN: CPT

## 2024-02-12 PROCEDURE — G2211 COMPLEX E/M VISIT ADD ON: CPT

## 2024-02-12 PROCEDURE — 93000 ELECTROCARDIOGRAM COMPLETE: CPT

## 2024-02-12 RX ORDER — ENTECAVIR 0.5 MG/1
0.5 TABLET, FILM COATED ORAL DAILY
Qty: 60 | Refills: 0 | Status: DISCONTINUED | COMMUNITY
Start: 2022-11-14 | End: 2024-02-12

## 2024-02-12 RX ORDER — ACYCLOVIR 200 MG/1
200 CAPSULE ORAL
Qty: 180 | Refills: 3 | Status: DISCONTINUED | COMMUNITY
Start: 2023-04-06 | End: 2024-02-12

## 2024-02-12 NOTE — DISCUSSION/SUMMARY
[FreeTextEntry1] : Patient with above hx   s/p syncopal episode :  poor nutritional status , likely dehydration causing his symptom however he was noted to have moderate to severe AS  could be contributing factor , low normal range SBP on flomax  , would advise the patient to maintain hydration , discontinued flomax  repeat echo  Valvular heart disease moderate to severe AS , moderate AI , Mild MR , TR  will continue to monitor with periodic echocardiogram   HLD : continue atorvastatin 10 mg po daily  HTN :  low normal range , continue low dose BB ,  discontinued  flomax  , continue finasteride   GERD" continue pepsid    hx of follicular lymphoma  s/p recent completion of chemotherapy   [EKG obtained to assist in diagnosis and management of assessed problem(s)] : EKG obtained to assist in diagnosis and management of assessed problem(s)

## 2024-02-12 NOTE — REVIEW OF SYSTEMS
[Fever] : no fever [Chills] : no chills [Blurry Vision] : no blurred vision [Earache] : no earache [SOB] : no shortness of breath [Cough] : no cough [Abdominal Pain] : no abdominal pain [Urinary Frequency] : no change in urinary frequency [Joint Pain] : no joint pain [Rash] : no rash [Dizziness] : no dizziness [Confusion] : no confusion was observed

## 2024-02-12 NOTE — CARDIOLOGY SUMMARY
[de-identified] : 2/12/24 sinus rhythm LVH  [de-identified] : 9/1/23 EF 60% mild DD Moderate to severe  AS 1.05 Sq cm , moderate  AI ,  Mild AI

## 2024-02-12 NOTE — HISTORY OF PRESENT ILLNESS
[FreeTextEntry1] : 83 year old male wit hx of  lymphoma completed chemotherapy  mid agust  ,BPH,HTN HLD valvular heart disease came for follow up  says he is feeling fine ,  patient  was seen in the ER few times after finding him on the floor , patient does not remember  detail , was observed in ER , was sent home , no recent recurrence  his blood pressure is normal range , as well  as his  HR , say  he drinks enough , flomax was discontinued    Patient denies any chest pain or shortness of breath or dizziness,  patient   did have CTA of chest without PE , but had right pleural effusion , now small bilateral pleural effusion ,   ekg showed T WI septal leads   , patient son says he is eating better than before ,   Patient had echo showed Normal EF mild DD , severe AS  PG 33 MG 18 CHARLY 1.05 Sq cm ,   hospital chart reviewed

## 2024-02-12 NOTE — PHYSICAL EXAM
[Frail] : frail [Cachectic] : cachexia was observed [Normal Conjunctiva] : normal conjunctiva [Normal Venous Pressure] : normal venous pressure [5th Left ICS - MCL] : palpated at the 5th LICS in the midclavicular line [No Precordial Heave] : no precordial heave was noted [Normal Rate] : normal [Rhythm Regular] : regular [Normal S1] : normal S1 [Normal S2] : normal S2 [II] : a grade 2 [III] : a grade 3 [Right Carotid Bruit] : no bruit heard over the right carotid [Left Carotid Bruit] : left carotid bruit heard [Right Femoral Bruit] : no bruit heard over the right femoral artery [Left Femoral Bruit] : no bruit heard over the left femoral artery [2+] : left 2+ [Normal] : clear lung fields, good air entry, no respiratory distress [Soft] : abdomen soft [Non Tender] : non-tender [Normal Bowel Sounds] : normal bowel sounds [Abnormal Gait] : abnormal gait [No Edema] : no edema [Normal DP B/L] : normal DP B/L [No Rash] : no rash

## 2024-03-14 ENCOUNTER — APPOINTMENT (OUTPATIENT)
Dept: HEMATOLOGY ONCOLOGY | Facility: CLINIC | Age: 84
End: 2024-03-14

## 2024-04-10 ENCOUNTER — OUTPATIENT (OUTPATIENT)
Dept: OUTPATIENT SERVICES | Facility: HOSPITAL | Age: 84
LOS: 1 days | Discharge: HOME | End: 2024-04-10

## 2024-04-10 DIAGNOSIS — C82.00 FOLLICULAR LYMPHOMA GRADE I, UNSPECIFIED SITE: ICD-10-CM

## 2024-04-10 DIAGNOSIS — Z98.890 OTHER SPECIFIED POSTPROCEDURAL STATES: Chronic | ICD-10-CM

## 2024-04-11 ENCOUNTER — RESULT REVIEW (OUTPATIENT)
Age: 84
End: 2024-04-11

## 2024-04-11 ENCOUNTER — LABORATORY RESULT (OUTPATIENT)
Age: 84
End: 2024-04-11

## 2024-04-11 ENCOUNTER — APPOINTMENT (OUTPATIENT)
Dept: HEMATOLOGY ONCOLOGY | Facility: CLINIC | Age: 84
End: 2024-04-11
Payer: MEDICARE

## 2024-04-11 VITALS
HEIGHT: 60 IN | HEART RATE: 80 BPM | RESPIRATION RATE: 17 BRPM | BODY MASS INDEX: 17.67 KG/M2 | DIASTOLIC BLOOD PRESSURE: 62 MMHG | SYSTOLIC BLOOD PRESSURE: 95 MMHG | WEIGHT: 90 LBS | TEMPERATURE: 97.3 F | OXYGEN SATURATION: 95 %

## 2024-04-11 LAB
BASOPHILS # BLD AUTO: 0.09 K/UL — SIGNIFICANT CHANGE UP (ref 0–0.2)
BASOPHILS NFR BLD AUTO: 0.7 % — SIGNIFICANT CHANGE UP (ref 0–2)
EOSINOPHIL # BLD AUTO: 4.76 K/UL — HIGH (ref 0–0.5)
EOSINOPHIL NFR BLD AUTO: 36.7 % — HIGH (ref 0–6)
HCT VFR BLD CALC: 38.9 % — LOW (ref 39–50)
HGB BLD-MCNC: 12.9 G/DL — LOW (ref 13–17)
IMM GRANULOCYTES NFR BLD AUTO: 0.5 % — SIGNIFICANT CHANGE UP (ref 0–0.9)
LYMPHOCYTES # BLD AUTO: 17.1 % — SIGNIFICANT CHANGE UP (ref 13–44)
LYMPHOCYTES # BLD AUTO: 2.22 K/UL — SIGNIFICANT CHANGE UP (ref 1–3.3)
MACROCYTES BLD QL: SLIGHT — SIGNIFICANT CHANGE UP
MCHC RBC-ENTMCNC: 29.8 PG — SIGNIFICANT CHANGE UP (ref 27–34)
MCHC RBC-ENTMCNC: 33.2 GM/DL — SIGNIFICANT CHANGE UP (ref 32–36)
MCV RBC AUTO: 89.8 FL — SIGNIFICANT CHANGE UP (ref 80–100)
MONOCYTES # BLD AUTO: 0.78 K/UL — SIGNIFICANT CHANGE UP (ref 0–0.9)
MONOCYTES NFR BLD AUTO: 6 % — SIGNIFICANT CHANGE UP (ref 2–14)
NEUTROPHILS # BLD AUTO: 5.06 K/UL — SIGNIFICANT CHANGE UP (ref 1.8–7.4)
NEUTROPHILS NFR BLD AUTO: 39 % — LOW (ref 43–77)
NRBC # BLD: 0 /100 WBCS — SIGNIFICANT CHANGE UP (ref 0–0)
PLAT MORPH BLD: NORMAL — SIGNIFICANT CHANGE UP
PLATELET # BLD AUTO: 271 K/UL — SIGNIFICANT CHANGE UP (ref 150–400)
RBC # BLD: 4.33 M/UL — SIGNIFICANT CHANGE UP (ref 4.2–5.8)
RBC # FLD: 14.6 % — HIGH (ref 10.3–14.5)
RBC BLD AUTO: SIGNIFICANT CHANGE UP
WBC # BLD: 12.97 K/UL — HIGH (ref 3.8–10.5)
WBC # FLD AUTO: 12.97 K/UL — HIGH (ref 3.8–10.5)

## 2024-04-11 PROCEDURE — 99215 OFFICE O/P EST HI 40 MIN: CPT

## 2024-04-11 NOTE — RESULTS/DATA
[FreeTextEntry1] : CT CAP Dec 15,  2023 IMPRESSION: Mildly enlarged pretracheal and right hilar nodes are stable since 9/1/2023. Stable nonenlarged axillary lymph nodes since 9/1/2023, which are markedly decreased from the significant bilateral axillary lymphadenopathy on 10/8/2022. Normal size spleen, previously enlarged on 3/7/2023 CT abdomen and pelvis. Marked decrease in the abdominal, pelvic and inguinal lymphadenopathy since 3/7/2023. Small bilateral pleural effusions, previously small to moderate. Scattered 2 to 3 mm pulmonary nodules, nonspecific.

## 2024-04-12 LAB
ALBUMIN SERPL ELPH-MCNC: 4.1 G/DL — SIGNIFICANT CHANGE UP (ref 3.3–5)
ALP SERPL-CCNC: 101 U/L — SIGNIFICANT CHANGE UP (ref 40–120)
ALT FLD-CCNC: 9 U/L — LOW (ref 10–45)
ANION GAP SERPL CALC-SCNC: 11 MMOL/L — SIGNIFICANT CHANGE UP (ref 5–17)
AST SERPL-CCNC: 13 U/L — SIGNIFICANT CHANGE UP (ref 10–40)
BILIRUB SERPL-MCNC: 0.2 MG/DL — SIGNIFICANT CHANGE UP (ref 0.2–1.2)
BUN SERPL-MCNC: 19 MG/DL — SIGNIFICANT CHANGE UP (ref 7–23)
CALCIUM SERPL-MCNC: 9.3 MG/DL — SIGNIFICANT CHANGE UP (ref 8.4–10.5)
CHLORIDE SERPL-SCNC: 107 MMOL/L — SIGNIFICANT CHANGE UP (ref 96–108)
CO2 SERPL-SCNC: 25 MMOL/L — SIGNIFICANT CHANGE UP (ref 22–31)
CREAT SERPL-MCNC: 0.97 MG/DL — SIGNIFICANT CHANGE UP (ref 0.5–1.3)
EGFR: 78 ML/MIN/1.73M2 — SIGNIFICANT CHANGE UP
GLUCOSE SERPL-MCNC: 90 MG/DL — SIGNIFICANT CHANGE UP (ref 70–99)
HAV IGM SER-ACNC: SIGNIFICANT CHANGE UP
HBV CORE IGM SER-ACNC: SIGNIFICANT CHANGE UP
HBV SURFACE AG SER-ACNC: SIGNIFICANT CHANGE UP
HCV AB S/CO SERPL IA: 0.16 S/CO — SIGNIFICANT CHANGE UP (ref 0–0.99)
HCV AB SERPL-IMP: SIGNIFICANT CHANGE UP
LDH SERPL L TO P-CCNC: 186 U/L — SIGNIFICANT CHANGE UP (ref 50–242)
POTASSIUM SERPL-MCNC: 4.4 MMOL/L — SIGNIFICANT CHANGE UP (ref 3.5–5.3)
POTASSIUM SERPL-SCNC: 4.4 MMOL/L — SIGNIFICANT CHANGE UP (ref 3.5–5.3)
PROT SERPL-MCNC: 6.6 G/DL — SIGNIFICANT CHANGE UP (ref 6–8.3)
SODIUM SERPL-SCNC: 144 MMOL/L — SIGNIFICANT CHANGE UP (ref 135–145)

## 2024-04-18 ENCOUNTER — RESULT REVIEW (OUTPATIENT)
Age: 84
End: 2024-04-18

## 2024-04-20 NOTE — ED PROVIDER NOTE - CPE EDP ENMT NORM
Call today or tomorrow to follow up with your OBGYN or Wilmar Medina, DO  in 2-3 days.    Ectopic pregnancy has not been completely ruled out in your pregnancy.  You should follow up with your OB / GYN as indicated or call for an appointment tomorrow.  If you are unable to get an appointment within 48 hours then you should return to the emergency department for additional blood test.    You should immediately return to the Emergency Department for worsening of vaginal bleeding, using more than 4 pads per hour, feeling of weakness, dizzy, nausea / vomiting, any other care or concern.    
normal...

## 2024-04-23 NOTE — PHYSICAL EXAM
[Thin] : thin [Normal] : affect appropriate [de-identified] : cervical adenopathy not palpable   [de-identified] : slightly diminished L base  [de-identified] : regular with systolic murmur  [de-identified] : no pedal edema  [de-identified] : no palpable adenopathy  [de-identified] : no overt rashes

## 2024-04-23 NOTE — REVIEW OF SYSTEMS
[Diarrhea: Grade 0] : Diarrhea: Grade 0 [Fatigue] : fatigue [Negative] : Allergic/Immunologic [FreeTextEntry2] : per HPI

## 2024-04-23 NOTE — HISTORY OF PRESENT ILLNESS
[de-identified] : Follicular lymphoma.   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  10/11/22 - Patient saw provider at [facility] with c/o 20 lbS  unintentional weight loss over past 2 months, no appetite, increased fatigue, and lymphadenopathy.   10/8/22 - Has Abd US to evaluate of AAA - no evidence of AAA but showed extensive retroperitoneal adenopathy and splenomegaly.  Was sent for CT scans.  10/8/22 - CT Chest (?Lung cancer screening) -  Bulky multi station thoracoabdominal lymphadenopathy with splenomegaly, concerning for lymphoma.  Peripheral splenic hypodensity may represent an infarct or mass. Small right and moderate left pleural effusions with underlying pleural thickening on the left; consider cytologic correlation, as lymphomatous involvement is possible.  10/18/22 - CT A/P - Diffuse bulky para-aortic, periportal, and mesenteric lymphadenopathy. Bilateral bulky inguinal and iliac chain lymphadenopathy. For reference: * Pleural LN measures 5.4 x 4.5 cm. * Right inguinal LN measures 3.6 x 1.8 cm. * Confluent RPLAD surrounding the aorta measures 9.3 x 6.2 cm.  10/21/22 - PET/CT -  Large  and markedly avid conglomerate adenopathy in the neck, chest, abdomen, and pelvis ( SUV ranges 3-5) . Large loculated  left pleural effusion and small R pleural effusion.   Hypermetabolic 1 cm cutaneous lesion in the right chest wall. Recommend clinical inspection as neoplasm could have this appearance.  10/20/22 - Labs with elevated WBC 15K, Peripheral blood flow cytometry: Monotypic B-cells (10%) positive for Kappa, CD19, CD20, CD10, negative for CD5, , CD23.   11/4/22 right axillary LN biopsy-  follicular lymphoma grade 1. [facility].   Left thoracentesis 11/4/22 - cytology negative.  Rituxan weekly x 4  11/30/22- 12/14/22   Hep B core positive. started on Entecavir antiviral prophylaxis  PET 1/14/23 : compared to Oct 2022- no response - bulky FERNANDO and L pleural effusion no change. Evidence of splenic and small bowel involvement.  Right axillary (3.6 x 2.4 cm, SUV 5.9, previously 2.8 x 1.5 cm, SUV 4.9). Subcarinal (3.7 x 1.7 cm, SUV 4.6, image 98, previously 3.9 x 1.9 cm, SUV 4.4). Unchanged large loculated LEFT pleural effusion with revisualized compressive atelectasis, small RIGHT pleural effusion shows slight further accumulation SPLEEN: Revisualized, heterogeneous, increased uptake relative to the liver, which is nonspecific but ABNORMAL (SUV 4.7, image 159, previously 4.0). Stable borderline splenomegaly to 13.0 cm. Left inguinal (3.1 x 1.9 cm, SUV 5.3, image 236, previously 3.2 x 2.0 cm, SUV 4.8). Right inguinal (4.3 x 1.8 cm, SUV 5.5, image 224, previously 3.6 x 1.8 cm, SUV 5.0).   4/19/23 Started bendamustine (reduced dose- 70mg/m2)/ rituxan    Immediate clinical response- palpable adenopathy resolving.   4 cycles of rituxan/bendamustine from- 4/19/23- 7/12/23   PET 8/19/23  post 4 cycles :   good respoonse . IMPRESSION: Since prior FDG-PET/CT scan 1/14/2023: 1.  Response to therapy with significant involution of previously seen bulky hypermetabolic nodes above and below the diaphragm, which are now globally much smaller, with mild uptake relative to background, in most cases less than or similar to blood pool activity. Deauville 2. 2.  Resolution of previously seen splenomegaly. Mild activity at suspected residual calcification is likely due to attenuation correction artifact. 3.  Loculated LEFT pleural effusion is now smaller. Small to moderate RIGHT pleural effusion is grossly stable. 4.  Previously seen RIGHT chest wall nodule has largely resolved.  Did not continue chemo - had ongoing weight loss due to poor taste on chemo- stopped after 4 cycles. Monitored.   Weight stable but developed significant eosinophilia of unclear etiology.   CT CAP Dec 15,  2023 IMPRESSION: Mildly enlarged pretracheal and right hilar nodes are stable since 9/1/2023. Stable nonenlarged axillary lymph nodes since 9/1/2023, which are markedly decreased from the significant bilateral axillary lymphadenopathy on 10/8/2022. Normal size spleen, previously enlarged on 3/7/2023 CT abdomen and pelvis. Marked decrease in the abdominal, pelvic and inguinal lymphadenopathy since 3/7/2023. Small bilateral pleural effusions, previously small to moderate. Scattered 2 to 3 mm pulmonary nodules, nonspecific.  [de-identified] : Overall he is stable, here with his son.  His appetite and weight are stable.   No new medications.  No pets.  No allergies.   He is off the solifenacin.  Only taking atorvastatin/flomax.  No rashes.  No cough, no dyspnea.  No diarrhea.

## 2024-04-23 NOTE — ASSESSMENT
[FreeTextEntry1] : This is an 84 year old male with follicular lymphoma, grade I, stage IIIS.   1. Follicular lymphoma-  He was initially treated with Rituxan x 4 completed 12/14/22. No response on follow up PET scan. He was then treated with bendamustine/rituxan from 4/19-7/12/23.  Good response on PET after 4 cycles, did not complete 6 cycles due to poor taste/weight loss.   CT CAP Dec 15, 2023 IMPRESSION: Mildly enlarged pretracheal and right hilar nodes are stable since 9/1/2023. Stable nonenlarged axillary lymph nodes since 9/1/2023, which are markedly decreased from the significant bilateral axillary lymphadenopathy on 10/8/2022. Normal size spleen, previously enlarged on 3/7/2023 CT abdomen and pelvis. Marked decrease in the abdominal, pelvic and inguinal lymphadenopathy since 3/7/2023. Small bilateral pleural effusions, previously small to moderate. Scattered 2 to 3 mm pulmonary nodules, nonspecific.  Check his CMP, LDH today.  Plan for repeat CT imaging of his chest/abd/pelvis.   2. Eosinophilia-  Today his AEC is 4.76, improved from 1/31/24- AEC 6.20.   Began after he received the rituxan.   Stool O&P neg.  MPD FISH was negative.  BCR-ABL/KATYA 2 normal.   Suspect possibly related to his lymphoma/or rituxan use.  No evidence of a clonal marrow disorder.  Will check a peripheral blood flow cytometry today.  If no improvement and CT imaging without active disease, would consider bone marrow biopsy and consider infectious disease/rheumatology evaluations.  IgE mildly elevated, ?allergic related.  Begin claritin daily.   He is Hep B core positive. No known hx of hepatitis. Completed antiviral prophylaxis.  He will follow up in 4 weeks.

## 2024-04-24 DIAGNOSIS — D72.10 EOSINOPHILIA, UNSPECIFIED: ICD-10-CM

## 2024-05-02 ENCOUNTER — OUTPATIENT (OUTPATIENT)
Dept: OUTPATIENT SERVICES | Facility: HOSPITAL | Age: 84
LOS: 1 days | End: 2024-05-02
Payer: MEDICARE

## 2024-05-02 ENCOUNTER — APPOINTMENT (OUTPATIENT)
Dept: CT IMAGING | Facility: CLINIC | Age: 84
End: 2024-05-02
Payer: MEDICARE

## 2024-05-02 DIAGNOSIS — C82.00 FOLLICULAR LYMPHOMA GRADE I, UNSPECIFIED SITE: ICD-10-CM

## 2024-05-02 DIAGNOSIS — Z98.890 OTHER SPECIFIED POSTPROCEDURAL STATES: Chronic | ICD-10-CM

## 2024-05-02 PROCEDURE — 71260 CT THORAX DX C+: CPT | Mod: 26

## 2024-05-02 PROCEDURE — 74177 CT ABD & PELVIS W/CONTRAST: CPT

## 2024-05-02 PROCEDURE — 71260 CT THORAX DX C+: CPT

## 2024-05-02 PROCEDURE — 74177 CT ABD & PELVIS W/CONTRAST: CPT | Mod: 26

## 2024-05-02 PROCEDURE — 70487 CT MAXILLOFACIAL W/DYE: CPT

## 2024-05-02 PROCEDURE — 70487 CT MAXILLOFACIAL W/DYE: CPT | Mod: 26

## 2024-05-13 ENCOUNTER — NON-APPOINTMENT (OUTPATIENT)
Age: 84
End: 2024-05-13

## 2024-05-13 ENCOUNTER — APPOINTMENT (OUTPATIENT)
Dept: CARDIOLOGY | Facility: CLINIC | Age: 84
End: 2024-05-13
Payer: MEDICARE

## 2024-05-13 VITALS
HEART RATE: 77 BPM | HEIGHT: 60 IN | BODY MASS INDEX: 17.67 KG/M2 | DIASTOLIC BLOOD PRESSURE: 68 MMHG | WEIGHT: 90 LBS | OXYGEN SATURATION: 96 % | SYSTOLIC BLOOD PRESSURE: 110 MMHG

## 2024-05-13 DIAGNOSIS — I10 ESSENTIAL (PRIMARY) HYPERTENSION: ICD-10-CM

## 2024-05-13 DIAGNOSIS — Z01.810 ENCOUNTER FOR PREPROCEDURAL CARDIOVASCULAR EXAMINATION: ICD-10-CM

## 2024-05-13 PROCEDURE — G2211 COMPLEX E/M VISIT ADD ON: CPT

## 2024-05-13 PROCEDURE — 93000 ELECTROCARDIOGRAM COMPLETE: CPT | Mod: NC

## 2024-05-13 PROCEDURE — 99214 OFFICE O/P EST MOD 30 MIN: CPT

## 2024-05-13 NOTE — PHYSICAL EXAM
[Frail] : frail [Cachectic] : cachexia was observed [Normal Conjunctiva] : normal conjunctiva [Normal Venous Pressure] : normal venous pressure [5th Left ICS - MCL] : palpated at the 5th LICS in the midclavicular line [No Precordial Heave] : no precordial heave was noted [Normal Rate] : normal [Rhythm Regular] : regular [Normal S1] : normal S1 [Normal S2] : normal S2 [II] : a grade 2 [III] : a grade 3 [Left Carotid Bruit] : left carotid bruit heard [2+] : left 2+ [Normal] : clear lung fields, good air entry, no respiratory distress [Non Tender] : non-tender [Soft] : abdomen soft [Normal Bowel Sounds] : normal bowel sounds [Abnormal Gait] : abnormal gait [No Edema] : no edema [Normal DP B/L] : normal DP B/L [No Rash] : no rash [Right Carotid Bruit] : no bruit heard over the right carotid [Right Femoral Bruit] : no bruit heard over the right femoral artery [Left Femoral Bruit] : no bruit heard over the left femoral artery

## 2024-05-13 NOTE — CARDIOLOGY SUMMARY
[de-identified] : 5/13/24 sinus rhythm LVH   [de-identified] : 9/1/23 EF 60% mild DD Moderate to severe  AS 1.05 Sq cm , moderate  AI ,  Mild AI

## 2024-05-13 NOTE — HISTORY OF PRESENT ILLNESS
[FreeTextEntry1] : 83 year old male wit hx of  lymphoma completed chemotherapy  mid agust  ,BPH,HTN HLD valvular heart disease came for follow up  says he is feeling fine , anticipating to teeth extraction ,   patient no recent recurrence fall vs syncopal episode ,  his blood pressure is normal range , as well  as his  HR , say  he drinks enough , flomax was discontinued    Patient denies any chest pain or shortness of breath or dizziness,  patient   did have CTA of chest without PE , but had right pleural effusion , now small bilateral pleural effusion ,   ekg showed T WI septal leads   , patient son says he is eating better than before ,   Patient had echo showed Normal EF mild DD , severe AS  PG 33 MG 18 CHARLY 1.05 Sq cm ,   hospital chart reviewed

## 2024-05-23 ENCOUNTER — RESULT REVIEW (OUTPATIENT)
Age: 84
End: 2024-05-23

## 2024-05-23 ENCOUNTER — APPOINTMENT (OUTPATIENT)
Dept: HEMATOLOGY ONCOLOGY | Facility: CLINIC | Age: 84
End: 2024-05-23
Payer: MEDICARE

## 2024-05-23 VITALS
WEIGHT: 94 LBS | OXYGEN SATURATION: 98 % | HEIGHT: 60 IN | BODY MASS INDEX: 18.46 KG/M2 | SYSTOLIC BLOOD PRESSURE: 106 MMHG | DIASTOLIC BLOOD PRESSURE: 67 MMHG | TEMPERATURE: 98.2 F | HEART RATE: 73 BPM

## 2024-05-23 DIAGNOSIS — D72.10 EOSINOPHILIA, UNSPECIFIED: ICD-10-CM

## 2024-05-23 DIAGNOSIS — C82.00 FOLLICULAR LYMPHOMA GRADE I, UNSPECIFIED SITE: ICD-10-CM

## 2024-05-23 LAB
BASOPHILS # BLD AUTO: 0.06 K/UL — SIGNIFICANT CHANGE UP (ref 0–0.2)
BASOPHILS NFR BLD AUTO: 0.4 % — SIGNIFICANT CHANGE UP (ref 0–2)
EOSINOPHIL # BLD AUTO: 4.33 K/UL — HIGH (ref 0–0.5)
EOSINOPHIL NFR BLD AUTO: 32 % — HIGH (ref 0–6)
HCT VFR BLD CALC: 37.7 % — LOW (ref 39–50)
HGB BLD-MCNC: 12.4 G/DL — LOW (ref 13–17)
IMM GRANULOCYTES NFR BLD AUTO: 0.4 % — SIGNIFICANT CHANGE UP (ref 0–0.9)
LYMPHOCYTES # BLD AUTO: 2.72 K/UL — SIGNIFICANT CHANGE UP (ref 1–3.3)
LYMPHOCYTES # BLD AUTO: 20.1 % — SIGNIFICANT CHANGE UP (ref 13–44)
MCHC RBC-ENTMCNC: 30 PG — SIGNIFICANT CHANGE UP (ref 27–34)
MCHC RBC-ENTMCNC: 32.9 GM/DL — SIGNIFICANT CHANGE UP (ref 32–36)
MCV RBC AUTO: 91.1 FL — SIGNIFICANT CHANGE UP (ref 80–100)
MONOCYTES # BLD AUTO: 0.73 K/UL — SIGNIFICANT CHANGE UP (ref 0–0.9)
MONOCYTES NFR BLD AUTO: 5.4 % — SIGNIFICANT CHANGE UP (ref 2–14)
NEUTROPHILS # BLD AUTO: 5.65 K/UL — SIGNIFICANT CHANGE UP (ref 1.8–7.4)
NEUTROPHILS NFR BLD AUTO: 41.7 % — LOW (ref 43–77)
NRBC # BLD: 0 /100 WBCS — SIGNIFICANT CHANGE UP (ref 0–0)
PLATELET # BLD AUTO: 242 K/UL — SIGNIFICANT CHANGE UP (ref 150–400)
RBC # BLD: 4.14 M/UL — LOW (ref 4.2–5.8)
RBC # FLD: 13.9 % — SIGNIFICANT CHANGE UP (ref 10.3–14.5)
WBC # BLD: 13.54 K/UL — HIGH (ref 3.8–10.5)
WBC # FLD AUTO: 13.54 K/UL — HIGH (ref 3.8–10.5)

## 2024-05-23 PROCEDURE — 99214 OFFICE O/P EST MOD 30 MIN: CPT

## 2024-05-23 NOTE — REVIEW OF SYSTEMS
[Fatigue] : fatigue [Diarrhea: Grade 0] : Diarrhea: Grade 0 [Negative] : Allergic/Immunologic [FreeTextEntry2] : per HPI

## 2024-05-23 NOTE — PHYSICAL EXAM
[Thin] : thin [Normal] : affect appropriate [de-identified] : slightly diminished L base  [de-identified] : regular with systolic murmur  [de-identified] : no palpable adenopathy  [de-identified] : no pedal edema  [de-identified] : no overt rashes

## 2024-05-23 NOTE — ASSESSMENT
[FreeTextEntry1] : This is an 84 year old male with follicular lymphoma, grade I, stage IIIS.   1. Follicular lymphoma-  He was initially treated with Rituxan x 4 completed 12/14/22. No response on follow up PET scan. He was then treated with bendamustine/rituxan from 4/19-7/12/23.  Good response on PET after 4 cycles, did not complete 6 cycles due to poor taste/weight loss.   CT CAP Dec 15, 2023 IMPRESSION: Mildly enlarged pretracheal and right hilar nodes are stable since 9/1/2023. Stable nonenlarged axillary lymph nodes since 9/1/2023, which are markedly decreased from the significant bilateral axillary lymphadenopathy on 10/8/2022. Normal size spleen, previously enlarged on 3/7/2023 CT abdomen and pelvis. Marked decrease in the abdominal, pelvic and inguinal lymphadenopathy since 3/7/2023. Small bilateral pleural effusions, previously small to moderate. Scattered 2 to 3 mm pulmonary nodules, nonspecific.  CT chest/abd/pelvis 5/2/24- No definite suspicious lymphadenopathy by size criteria. Slight decreased size of previously described pretracheal lymph node, measuring up to 0.8 cm short axis, previously 1 cm with similar measurement technique. Left lower lobe 0.3 cm solid pulmonary nodule, not definitely identified on prior study (possibly obscured by atelectasis).  Decreased small bilateral pleural effusions. LDH is normal.    2. Eosinophilia-  Today his AEC is 4.33, continues to slowly improve.  Last 4.76 on 4/11, 1/24- AEC 6.20.   Began after he received the rituxan.   Stool O&P neg.  MPD FISH was negative.  BCR-ABL/KATYA 2 normal.   Peripheral blood flow cytometry 4/11 normal.  Suspect possibly related to his lymphoma/or rituxan use.  No evidence of a clonal marrow disorder.  CT scan with no evidence of lymphoma, infection.  For now as his eosinophilia is improving and he is asymptomatic, will continue to monitor him closely.  Repeat in 2 months, if not better, will consider a bone marrow at that time.  IgE mildly elevated, ?allergic related.  On claritin daily, doesn't appear to be helping.   He is Hep B core positive. No known hx of hepatitis. Completed antiviral prophylaxis.  He will follow up in 8 weeks.

## 2024-05-23 NOTE — HISTORY OF PRESENT ILLNESS
[de-identified] : Follicular lymphoma.   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  10/11/22 - Patient saw provider at [facility] with c/o 20 lbS  unintentional weight loss over past 2 months, no appetite, increased fatigue, and lymphadenopathy.   10/8/22 - Has Abd US to evaluate of AAA - no evidence of AAA but showed extensive retroperitoneal adenopathy and splenomegaly.  Was sent for CT scans.  10/8/22 - CT Chest (?Lung cancer screening) -  Bulky multi station thoracoabdominal lymphadenopathy with splenomegaly, concerning for lymphoma.  Peripheral splenic hypodensity may represent an infarct or mass. Small right and moderate left pleural effusions with underlying pleural thickening on the left; consider cytologic correlation, as lymphomatous involvement is possible.  10/18/22 - CT A/P - Diffuse bulky para-aortic, periportal, and mesenteric lymphadenopathy. Bilateral bulky inguinal and iliac chain lymphadenopathy. For reference: * Pleural LN measures 5.4 x 4.5 cm. * Right inguinal LN measures 3.6 x 1.8 cm. * Confluent RPLAD surrounding the aorta measures 9.3 x 6.2 cm.  10/21/22 - PET/CT -  Large  and markedly avid conglomerate adenopathy in the neck, chest, abdomen, and pelvis ( SUV ranges 3-5) . Large loculated  left pleural effusion and small R pleural effusion.   Hypermetabolic 1 cm cutaneous lesion in the right chest wall. Recommend clinical inspection as neoplasm could have this appearance.  10/20/22 - Labs with elevated WBC 15K, Peripheral blood flow cytometry: Monotypic B-cells (10%) positive for Kappa, CD19, CD20, CD10, negative for CD5, , CD23.   11/4/22 right axillary LN biopsy-  follicular lymphoma grade 1. [facility].   Left thoracentesis 11/4/22 - cytology negative.  Rituxan weekly x 4  11/30/22- 12/14/22   Hep B core positive. started on Entecavir antiviral prophylaxis  PET 1/14/23 : compared to Oct 2022- no response - bulky FERNANDO and L pleural effusion no change. Evidence of splenic and small bowel involvement.  Right axillary (3.6 x 2.4 cm, SUV 5.9, previously 2.8 x 1.5 cm, SUV 4.9). Subcarinal (3.7 x 1.7 cm, SUV 4.6, image 98, previously 3.9 x 1.9 cm, SUV 4.4). Unchanged large loculated LEFT pleural effusion with revisualized compressive atelectasis, small RIGHT pleural effusion shows slight further accumulation SPLEEN: Revisualized, heterogeneous, increased uptake relative to the liver, which is nonspecific but ABNORMAL (SUV 4.7, image 159, previously 4.0). Stable borderline splenomegaly to 13.0 cm. Left inguinal (3.1 x 1.9 cm, SUV 5.3, image 236, previously 3.2 x 2.0 cm, SUV 4.8). Right inguinal (4.3 x 1.8 cm, SUV 5.5, image 224, previously 3.6 x 1.8 cm, SUV 5.0).   4/19/23 Started bendamustine (reduced dose- 70mg/m2)/ rituxan    Immediate clinical response- palpable adenopathy resolving.   4 cycles of rituxan/bendamustine from- 4/19/23- 7/12/23   PET 8/19/23  post 4 cycles :   good respoonse . IMPRESSION: Since prior FDG-PET/CT scan 1/14/2023: 1.  Response to therapy with significant involution of previously seen bulky hypermetabolic nodes above and below the diaphragm, which are now globally much smaller, with mild uptake relative to background, in most cases less than or similar to blood pool activity. Deauville 2. 2.  Resolution of previously seen splenomegaly. Mild activity at suspected residual calcification is likely due to attenuation correction artifact. 3.  Loculated LEFT pleural effusion is now smaller. Small to moderate RIGHT pleural effusion is grossly stable. 4.  Previously seen RIGHT chest wall nodule has largely resolved.  Did not continue chemo - had ongoing weight loss due to poor taste on chemo- stopped after 4 cycles. Monitored.   Weight stable but developed significant eosinophilia of unclear etiology.   CT CAP Dec 15,  2023 IMPRESSION: Mildly enlarged pretracheal and right hilar nodes are stable since 9/1/2023. Stable nonenlarged axillary lymph nodes since 9/1/2023, which are markedly decreased from the significant bilateral axillary lymphadenopathy on 10/8/2022. Normal size spleen, previously enlarged on 3/7/2023 CT abdomen and pelvis. Marked decrease in the abdominal, pelvic and inguinal lymphadenopathy since 3/7/2023. Small bilateral pleural effusions, previously small to moderate. Scattered 2 to 3 mm pulmonary nodules, nonspecific.  [de-identified] : Overall he is stable, here with his son.  He continues to improve slowly, better appetite, po intake.  No new medications.  No pets.  No allergies.   No rashes.  No cough, no dyspnea.  No diarrhea.   Has been on the claritin, no change.

## 2024-05-31 ENCOUNTER — APPOINTMENT (OUTPATIENT)
Dept: CARDIOLOGY | Facility: CLINIC | Age: 84
End: 2024-05-31
Payer: MEDICARE

## 2024-05-31 VITALS
DIASTOLIC BLOOD PRESSURE: 60 MMHG | WEIGHT: 94 LBS | HEART RATE: 75 BPM | SYSTOLIC BLOOD PRESSURE: 108 MMHG | BODY MASS INDEX: 18.46 KG/M2 | HEIGHT: 60 IN | OXYGEN SATURATION: 97 %

## 2024-05-31 DIAGNOSIS — R55 SYNCOPE AND COLLAPSE: ICD-10-CM

## 2024-05-31 DIAGNOSIS — R01.1 CARDIAC MURMUR, UNSPECIFIED: ICD-10-CM

## 2024-05-31 DIAGNOSIS — I35.0 NONRHEUMATIC AORTIC (VALVE) STENOSIS: ICD-10-CM

## 2024-05-31 DIAGNOSIS — E78.00 PURE HYPERCHOLESTEROLEMIA, UNSPECIFIED: ICD-10-CM

## 2024-05-31 PROCEDURE — G2211 COMPLEX E/M VISIT ADD ON: CPT

## 2024-05-31 PROCEDURE — 93306 TTE W/DOPPLER COMPLETE: CPT

## 2024-05-31 PROCEDURE — 99214 OFFICE O/P EST MOD 30 MIN: CPT

## 2024-05-31 NOTE — HISTORY OF PRESENT ILLNESS
[FreeTextEntry1] : 83 year old male wit hx of  lymphoma completed chemotherapy  mid agust  ,BPH,HTN HLD valvular heart disease came for follow up  says he is feeling fine , anticipating to teeth extraction ,   patient no recent recurrence fall vs syncopal episode ,  his blood pressure is normal range , as well  as his  HR , say  he drinks enough , flomax was discontinued  his echo reviewed today , moderate to severe AS 1.1 sq cm , normal EF    Patient denies any chest pain or shortness of breath or dizziness,  patient   did have CTA of chest without PE , but had right pleural effusion , now small bilateral pleural effusion ,   ekg showed T WI septal leads   , patient son says he is eating better than before ,     hospital chart reviewed

## 2024-05-31 NOTE — REASON FOR VISIT
[Symptom and Test Evaluation] : symptom and test evaluation [Structural Heart and Valve Disease] : structural heart and valve disease [Hyperlipidemia] : hyperlipidemia [Hypertension] : hypertension [Family Member] : family member

## 2024-05-31 NOTE — PHYSICAL EXAM
[Frail] : frail [Cachectic] : cachexia was observed [Normal Conjunctiva] : normal conjunctiva [Normal Venous Pressure] : normal venous pressure [5th Left ICS - MCL] : palpated at the 5th LICS in the midclavicular line [No Precordial Heave] : no precordial heave was noted [Normal Rate] : normal [Rhythm Regular] : regular [Normal S1] : normal S1 [Normal S2] : normal S2 [II] : a grade 2 [III] : a grade 3 [Left Carotid Bruit] : left carotid bruit heard [2+] : left 2+ [Normal] : clear lung fields, good air entry, no respiratory distress [Soft] : abdomen soft [Non Tender] : non-tender [Normal Bowel Sounds] : normal bowel sounds [Abnormal Gait] : abnormal gait [No Edema] : no edema [Normal DP B/L] : normal DP B/L [No Rash] : no rash [Right Femoral Bruit] : no bruit heard over the right femoral artery [Right Carotid Bruit] : no bruit heard over the right carotid [Left Femoral Bruit] : no bruit heard over the left femoral artery

## 2024-05-31 NOTE — CARDIOLOGY SUMMARY
[de-identified] : 5/13/24 sinus rhythm LVH   [de-identified] : 9/1/23 EF 60% mild DD Moderate to severe  AS 1.05 Sq cm , moderate  AI ,  Mild AI  5/31/24 Normal EF  DD , moderate to severe AS CHARLY 1.1 sq cm , Moderate AI , mild AI

## 2024-05-31 NOTE — DISCUSSION/SUMMARY
[FreeTextEntry1] : Patient with above hx   s/p syncopal episode :  poor nutritional status , likely dehydration causing his symptom however he was noted to have moderate to severe AS  could be contributing factor ,improved BP off of  flomax  , would advise the patient to maintain hydration , discontinued flomax   nor recurrence of syncope   Valvular heart disease moderate to severe AS , moderate AI , Mild MR , TR  will continue to monitor with periodic echocardiogram   HLD :  improved , off of medication , discontinued   HTN :  low normal range ,  low dose BB discontinued  ,  discontinued  flomax  , continue finasteride   GERD" continue pepsid    hx of follicular lymphoma  s/p recent completion of chemotherapy   patient is overall stable for low risk dental extraction procedure , can use xylocaine avoid epinephrine

## 2024-08-29 ENCOUNTER — OUTPATIENT (OUTPATIENT)
Dept: OUTPATIENT SERVICES | Facility: HOSPITAL | Age: 84
LOS: 1 days | Discharge: HOME | End: 2024-08-29

## 2024-08-29 DIAGNOSIS — Z98.890 OTHER SPECIFIED POSTPROCEDURAL STATES: Chronic | ICD-10-CM

## 2024-08-29 DIAGNOSIS — C82.00 FOLLICULAR LYMPHOMA GRADE I, UNSPECIFIED SITE: ICD-10-CM

## 2024-09-05 ENCOUNTER — RESULT REVIEW (OUTPATIENT)
Age: 84
End: 2024-09-05

## 2024-09-05 ENCOUNTER — APPOINTMENT (OUTPATIENT)
Dept: HEMATOLOGY ONCOLOGY | Facility: CLINIC | Age: 84
End: 2024-09-05

## 2024-09-05 VITALS
HEIGHT: 60 IN | DIASTOLIC BLOOD PRESSURE: 60 MMHG | WEIGHT: 96 LBS | OXYGEN SATURATION: 100 % | HEART RATE: 80 BPM | TEMPERATURE: 97.9 F | BODY MASS INDEX: 18.85 KG/M2 | SYSTOLIC BLOOD PRESSURE: 129 MMHG

## 2024-09-05 DIAGNOSIS — D72.10 EOSINOPHILIA, UNSPECIFIED: ICD-10-CM

## 2024-09-05 DIAGNOSIS — C82.00 FOLLICULAR LYMPHOMA GRADE I, UNSPECIFIED SITE: ICD-10-CM

## 2024-09-05 LAB
BASOPHILS # BLD AUTO: 0.1 K/UL — SIGNIFICANT CHANGE UP (ref 0–0.2)
BASOPHILS NFR BLD AUTO: 0.8 % — SIGNIFICANT CHANGE UP (ref 0–2)
EOSINOPHIL # BLD AUTO: 3.2 K/UL — HIGH (ref 0–0.5)
EOSINOPHIL NFR BLD AUTO: 26.1 % — HIGH (ref 0–6)
HCT VFR BLD CALC: 37.7 % — LOW (ref 39–50)
HGB BLD-MCNC: 12.4 G/DL — LOW (ref 13–17)
HYPOCHROMIA BLD QL: SLIGHT — SIGNIFICANT CHANGE UP
IMM GRANULOCYTES NFR BLD AUTO: 0.5 % — SIGNIFICANT CHANGE UP (ref 0–0.9)
LYMPHOCYTES # BLD AUTO: 28.1 % — SIGNIFICANT CHANGE UP (ref 13–44)
LYMPHOCYTES # BLD AUTO: 3.44 K/UL — HIGH (ref 1–3.3)
MCHC RBC-ENTMCNC: 30 PG — SIGNIFICANT CHANGE UP (ref 27–34)
MCHC RBC-ENTMCNC: 32.9 GM/DL — SIGNIFICANT CHANGE UP (ref 32–36)
MCV RBC AUTO: 91.1 FL — SIGNIFICANT CHANGE UP (ref 80–100)
MONOCYTES # BLD AUTO: 0.73 K/UL — SIGNIFICANT CHANGE UP (ref 0–0.9)
MONOCYTES NFR BLD AUTO: 6 % — SIGNIFICANT CHANGE UP (ref 2–14)
NEUTROPHILS # BLD AUTO: 4.73 K/UL — SIGNIFICANT CHANGE UP (ref 1.8–7.4)
NEUTROPHILS NFR BLD AUTO: 38.5 % — LOW (ref 43–77)
NRBC # BLD: 0 /100 WBCS — SIGNIFICANT CHANGE UP (ref 0–0)
PLAT MORPH BLD: NORMAL — SIGNIFICANT CHANGE UP
PLATELET # BLD AUTO: 220 K/UL — SIGNIFICANT CHANGE UP (ref 150–400)
RBC # BLD: 4.14 M/UL — LOW (ref 4.2–5.8)
RBC # FLD: 13.4 % — SIGNIFICANT CHANGE UP (ref 10.3–14.5)
RBC BLD AUTO: SIGNIFICANT CHANGE UP
WBC # BLD: 12.26 K/UL — HIGH (ref 3.8–10.5)
WBC # FLD AUTO: 12.26 K/UL — HIGH (ref 3.8–10.5)

## 2024-09-05 PROCEDURE — 99214 OFFICE O/P EST MOD 30 MIN: CPT

## 2024-09-05 NOTE — HISTORY OF PRESENT ILLNESS
[de-identified] : Follicular lymphoma.   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  10/11/22 - Patient saw provider at [facility] with c/o 20 lbS  unintentional weight loss over past 2 months, no appetite, increased fatigue, and lymphadenopathy.   10/8/22 - Has Abd US to evaluate of AAA - no evidence of AAA but showed extensive retroperitoneal adenopathy and splenomegaly.  Was sent for CT scans.  10/8/22 - CT Chest (?Lung cancer screening) -  Bulky multi station thoracoabdominal lymphadenopathy with splenomegaly, concerning for lymphoma.  Peripheral splenic hypodensity may represent an infarct or mass. Small right and moderate left pleural effusions with underlying pleural thickening on the left; consider cytologic correlation, as lymphomatous involvement is possible.  10/18/22 - CT A/P - Diffuse bulky para-aortic, periportal, and mesenteric lymphadenopathy. Bilateral bulky inguinal and iliac chain lymphadenopathy. For reference: * Pleural LN measures 5.4 x 4.5 cm. * Right inguinal LN measures 3.6 x 1.8 cm. * Confluent RPLAD surrounding the aorta measures 9.3 x 6.2 cm.  10/21/22 - PET/CT -  Large  and markedly avid conglomerate adenopathy in the neck, chest, abdomen, and pelvis ( SUV ranges 3-5) . Large loculated  left pleural effusion and small R pleural effusion.   Hypermetabolic 1 cm cutaneous lesion in the right chest wall. Recommend clinical inspection as neoplasm could have this appearance.  10/20/22 - Labs with elevated WBC 15K, Peripheral blood flow cytometry: Monotypic B-cells (10%) positive for Kappa, CD19, CD20, CD10, negative for CD5, , CD23.   11/4/22 right axillary LN biopsy-  follicular lymphoma grade 1. [facility].   Left thoracentesis 11/4/22 - cytology negative.  Rituxan weekly x 4  11/30/22- 12/14/22   Hep B core positive. started on Entecavir antiviral prophylaxis  PET 1/14/23 : compared to Oct 2022- no response - bulky FERNANDO and L pleural effusion no change. Evidence of splenic and small bowel involvement.  Right axillary (3.6 x 2.4 cm, SUV 5.9, previously 2.8 x 1.5 cm, SUV 4.9). Subcarinal (3.7 x 1.7 cm, SUV 4.6, image 98, previously 3.9 x 1.9 cm, SUV 4.4). Unchanged large loculated LEFT pleural effusion with revisualized compressive atelectasis, small RIGHT pleural effusion shows slight further accumulation SPLEEN: Revisualized, heterogeneous, increased uptake relative to the liver, which is nonspecific but ABNORMAL (SUV 4.7, image 159, previously 4.0). Stable borderline splenomegaly to 13.0 cm. Left inguinal (3.1 x 1.9 cm, SUV 5.3, image 236, previously 3.2 x 2.0 cm, SUV 4.8). Right inguinal (4.3 x 1.8 cm, SUV 5.5, image 224, previously 3.6 x 1.8 cm, SUV 5.0).   4/19/23 Started bendamustine (reduced dose- 70mg/m2)/ rituxan    Immediate clinical response- palpable adenopathy resolving.   4 cycles of rituxan/bendamustine from- 4/19/23- 7/12/23   PET 8/19/23  post 4 cycles :   good respoonse . IMPRESSION: Since prior FDG-PET/CT scan 1/14/2023: 1.  Response to therapy with significant involution of previously seen bulky hypermetabolic nodes above and below the diaphragm, which are now globally much smaller, with mild uptake relative to background, in most cases less than or similar to blood pool activity. Deauville 2. 2.  Resolution of previously seen splenomegaly. Mild activity at suspected residual calcification is likely due to attenuation correction artifact. 3.  Loculated LEFT pleural effusion is now smaller. Small to moderate RIGHT pleural effusion is grossly stable. 4.  Previously seen RIGHT chest wall nodule has largely resolved.  Did not continue chemo - had ongoing weight loss due to poor taste on chemo- stopped after 4 cycles. Monitored.   Weight stable but developed significant eosinophilia of unclear etiology.   CT CAP Dec 15,  2023 IMPRESSION: Mildly enlarged pretracheal and right hilar nodes are stable since 9/1/2023. Stable nonenlarged axillary lymph nodes since 9/1/2023, which are markedly decreased from the significant bilateral axillary lymphadenopathy on 10/8/2022. Normal size spleen, previously enlarged on 3/7/2023 CT abdomen and pelvis. Marked decrease in the abdominal, pelvic and inguinal lymphadenopathy since 3/7/2023. Small bilateral pleural effusions, previously small to moderate. Scattered 2 to 3 mm pulmonary nodules, nonspecific.  [de-identified] : He is here for a follow up with his son.  He continues to improve, eating better, gained a few pounds.   His energy is better.  No rash, no flushing, no diarrhea. No rashes.  No new medications.  No pets.  No allergies.

## 2024-09-05 NOTE — ASSESSMENT
[FreeTextEntry1] : This is an 84 year old male with follicular lymphoma, grade I, stage IIIS.   1. Follicular lymphoma-  He was initially treated with Rituxan x 4 completed 12/14/22. No response on follow up PET scan. He was then treated with bendamustine/rituxan from 4/19-7/12/23.  Good response on PET after 4 cycles, did not complete 6 cycles due to poor taste/weight loss.   CT CAP Dec 15, 2023 IMPRESSION: Mildly enlarged pretracheal and right hilar nodes are stable since 9/1/2023. Stable nonenlarged axillary lymph nodes since 9/1/2023, which are markedly decreased from the significant bilateral axillary lymphadenopathy on 10/8/2022. Normal size spleen, previously enlarged on 3/7/2023 CT abdomen and pelvis. Marked decrease in the abdominal, pelvic and inguinal lymphadenopathy since 3/7/2023. Small bilateral pleural effusions, previously small to moderate. Scattered 2 to 3 mm pulmonary nodules, nonspecific.  CT chest/abd/pelvis 5/2/24- No definite suspicious lymphadenopathy by size criteria. Slight decreased size of previously described pretracheal lymph node, measuring up to 0.8 cm short axis, previously 1 cm with similar measurement technique. Left lower lobe 0.3 cm solid pulmonary nodule, not definitely identified on prior study (possibly obscured by atelectasis).  Decreased small bilateral pleural effusions. LDH is normal.    2. Eosinophilia-  Eosinophilia is slowly improving, today down to 3.187.  Last 4.76 on 4/11, 1/24- AEC 6.20.   Began after he received the rituxan.   Stool O&P neg.  MPD FISH was negative.  BCR-ABL/KATYA 2 normal.   Peripheral blood flow cytometry 4/11 normal.  Suspect possibly related to his lymphoma/or rituxan use.  No evidence of a clonal marrow disorder.  CT scan with no evidence of lymphoma, infection.  For now as his eosinophilia is improving and he is asymptomatic, will continue to monitor him closely.  The patient and his son are not amenable to a bone marrow biopsy at this time.  IgE mildly elevated, ?allergic related.  On claritin daily, doesn't appear to be helping- can stop.   He is Hep B core positive. No known hx of hepatitis. Completed antiviral prophylaxis.  Plan for a CT of his chest/abdomen/pelvis in November, follow up in December.

## 2024-09-05 NOTE — PHYSICAL EXAM
[Thin] : thin [Normal] : affect appropriate [de-identified] : slightly diminished L base  [de-identified] : no pedal edema  [de-identified] : regular with systolic murmur  [de-identified] : no overt rashes  [de-identified] : no palpable adenopathy

## 2024-09-06 LAB
ALBUMIN SERPL ELPH-MCNC: 4.3 G/DL — SIGNIFICANT CHANGE UP (ref 3.3–5)
ALP SERPL-CCNC: 96 U/L — SIGNIFICANT CHANGE UP (ref 40–120)
ALT FLD-CCNC: 10 U/L — SIGNIFICANT CHANGE UP (ref 10–45)
ANION GAP SERPL CALC-SCNC: 12 MMOL/L — SIGNIFICANT CHANGE UP (ref 5–17)
AST SERPL-CCNC: 11 U/L — SIGNIFICANT CHANGE UP (ref 10–40)
BILIRUB SERPL-MCNC: 0.2 MG/DL — SIGNIFICANT CHANGE UP (ref 0.2–1.2)
BUN SERPL-MCNC: 21 MG/DL — SIGNIFICANT CHANGE UP (ref 7–23)
CALCIUM SERPL-MCNC: 9.4 MG/DL — SIGNIFICANT CHANGE UP (ref 8.4–10.5)
CHLORIDE SERPL-SCNC: 104 MMOL/L — SIGNIFICANT CHANGE UP (ref 96–108)
CO2 SERPL-SCNC: 26 MMOL/L — SIGNIFICANT CHANGE UP (ref 22–31)
CREAT SERPL-MCNC: 0.98 MG/DL — SIGNIFICANT CHANGE UP (ref 0.5–1.3)
EGFR: 77 ML/MIN/1.73M2 — SIGNIFICANT CHANGE UP
GLUCOSE SERPL-MCNC: 97 MG/DL — SIGNIFICANT CHANGE UP (ref 70–99)
LDH SERPL L TO P-CCNC: 152 U/L — SIGNIFICANT CHANGE UP (ref 50–242)
POTASSIUM SERPL-MCNC: 4.4 MMOL/L — SIGNIFICANT CHANGE UP (ref 3.5–5.3)
POTASSIUM SERPL-SCNC: 4.4 MMOL/L — SIGNIFICANT CHANGE UP (ref 3.5–5.3)
PROT SERPL-MCNC: 7.1 G/DL — SIGNIFICANT CHANGE UP (ref 6–8.3)
SODIUM SERPL-SCNC: 142 MMOL/L — SIGNIFICANT CHANGE UP (ref 135–145)

## 2024-09-23 ENCOUNTER — NON-APPOINTMENT (OUTPATIENT)
Age: 84
End: 2024-09-23

## 2024-09-23 ENCOUNTER — APPOINTMENT (OUTPATIENT)
Dept: CARDIOLOGY | Facility: CLINIC | Age: 84
End: 2024-09-23
Payer: MEDICARE

## 2024-09-23 VITALS
WEIGHT: 94 LBS | HEIGHT: 60 IN | DIASTOLIC BLOOD PRESSURE: 62 MMHG | SYSTOLIC BLOOD PRESSURE: 110 MMHG | OXYGEN SATURATION: 98 % | BODY MASS INDEX: 18.46 KG/M2 | HEART RATE: 76 BPM

## 2024-09-23 DIAGNOSIS — R01.1 CARDIAC MURMUR, UNSPECIFIED: ICD-10-CM

## 2024-09-23 DIAGNOSIS — I35.0 NONRHEUMATIC AORTIC (VALVE) STENOSIS: ICD-10-CM

## 2024-09-23 DIAGNOSIS — I35.1 NONRHEUMATIC AORTIC (VALVE) INSUFFICIENCY: ICD-10-CM

## 2024-09-23 DIAGNOSIS — C82.00 FOLLICULAR LYMPHOMA GRADE I, UNSPECIFIED SITE: ICD-10-CM

## 2024-09-23 DIAGNOSIS — I10 ESSENTIAL (PRIMARY) HYPERTENSION: ICD-10-CM

## 2024-09-23 DIAGNOSIS — E78.00 PURE HYPERCHOLESTEROLEMIA, UNSPECIFIED: ICD-10-CM

## 2024-09-23 PROCEDURE — 93000 ELECTROCARDIOGRAM COMPLETE: CPT

## 2024-09-23 PROCEDURE — 99214 OFFICE O/P EST MOD 30 MIN: CPT

## 2024-09-23 PROCEDURE — G2211 COMPLEX E/M VISIT ADD ON: CPT

## 2024-09-23 NOTE — PHYSICAL EXAM
[Frail] : frail [Cachectic] : cachexia was observed [Normal Conjunctiva] : normal conjunctiva [Normal Venous Pressure] : normal venous pressure [5th Left ICS - MCL] : palpated at the 5th LICS in the midclavicular line [No Precordial Heave] : no precordial heave was noted [Normal Rate] : normal [Rhythm Regular] : regular [Normal S1] : normal S1 [Normal S2] : normal S2 [II] : a grade 2 [III] : a grade 3 [Left Carotid Bruit] : left carotid bruit heard [2+] : left 2+ [Normal] : clear lung fields, good air entry, no respiratory distress [Soft] : abdomen soft [Non Tender] : non-tender [Normal Bowel Sounds] : normal bowel sounds [Abnormal Gait] : abnormal gait [No Edema] : no edema [Normal DP B/L] : normal DP B/L [No Rash] : no rash [Right Carotid Bruit] : no bruit heard over the right carotid [Right Femoral Bruit] : no bruit heard over the right femoral artery [Left Femoral Bruit] : no bruit heard over the left femoral artery

## 2024-09-23 NOTE — HISTORY OF PRESENT ILLNESS
[FreeTextEntry1] : 83 year old male wit hx of follicular  lymphoma completed chemotherapy mid august  ,BPH,HTN HLD valvular heart disease came for follow up  says he is feeling fine ,   patient no recent recurrence fall vs syncopal episode ,  his blood pressure is normal range , as well  as his  HR , say  he drinks enough water  , flomax was discontinued  his echo reviewed today , moderate to severe AS 1.1 sq cm , normal EF    Patient denies any chest pain or shortness of breath or dizziness,  patient   did have CTA of chest without PE , but had right pleural effusion , now small bilateral pleural effusion ,   ekg showed T WI septal leads   , patient son says he is eating better than before , weight is stable    hospital chart reviewed

## 2024-09-23 NOTE — DISCUSSION/SUMMARY
[FreeTextEntry1] : Patient with above hx   s/p syncopal episode :  no recurrence , poor nutritional status , likely dehydration causing his symptom however he was noted to have moderate to severe AS  could be contributing factor ,improved BP off of  flomax  , would advise the patient to maintain hydration , discontinued flomax   nor recurrence of syncope   Valvular heart disease moderate to severe AS , moderate AI , Mild MR , TR  will continue to monitor with periodic echocardiogram   HLD :  improved , off of medication , discontinued   HTN :  improved ,  normal range ,  low dose BB discontinued  ,  discontinued  flomax  , continue finasteride   GERD" continue pepsid    hx of follicular lymphoma  s/p recent completion of chemotherapy    [EKG obtained to assist in diagnosis and management of assessed problem(s)] : EKG obtained to assist in diagnosis and management of assessed problem(s)

## 2024-09-23 NOTE — CARDIOLOGY SUMMARY
[de-identified] : 9/23/24 sinus rhythm LVH   [de-identified] : 9/1/23 EF 60% mild DD Moderate to severe  AS 1.05 Sq cm , moderate  AI ,  Mild AI  5/31/24 Normal EF  DD , moderate to severe AS CHARLY 1.1 sq cm , Moderate AI , mild AI

## 2024-09-24 DIAGNOSIS — D72.10 EOSINOPHILIA, UNSPECIFIED: ICD-10-CM

## 2024-09-27 ENCOUNTER — APPOINTMENT (OUTPATIENT)
Dept: UROLOGY | Facility: CLINIC | Age: 84
End: 2024-09-27

## 2024-09-27 VITALS
HEIGHT: 60 IN | BODY MASS INDEX: 18.65 KG/M2 | SYSTOLIC BLOOD PRESSURE: 95 MMHG | OXYGEN SATURATION: 97 % | WEIGHT: 95 LBS | DIASTOLIC BLOOD PRESSURE: 54 MMHG | RESPIRATION RATE: 16 BRPM | HEART RATE: 81 BPM

## 2024-09-27 DIAGNOSIS — N13.8 BENIGN PROSTATIC HYPERPLASIA WITH LOWER URINARY TRACT SYMPMS: ICD-10-CM

## 2024-09-27 DIAGNOSIS — N21.0 CALCULUS IN BLADDER: ICD-10-CM

## 2024-09-27 DIAGNOSIS — N40.1 BENIGN PROSTATIC HYPERPLASIA WITH LOWER URINARY TRACT SYMPMS: ICD-10-CM

## 2024-09-27 PROCEDURE — 99214 OFFICE O/P EST MOD 30 MIN: CPT

## 2024-09-30 ENCOUNTER — RESULT REVIEW (OUTPATIENT)
Age: 84
End: 2024-09-30

## 2024-09-30 NOTE — LETTER BODY
[Dear  ___] : Dear  [unfilled], [Courtesy Letter:] : I had the pleasure of seeing your patient, [unfilled], in my office today. [Please see my note below.] : Please see my note below. [Sincerely,] : Sincerely, [FreeTextEntry3] : Flaco Baptiste MD\par   of Urology\par  [facility] [employer] [facility]\par  \par  [facility]\par  Offices:\par  [address] [facility]\par  [address] [address]\par  [address] [facility], [address]\par  \par  TEL: [phone]\par  FAX: [phone]

## 2024-09-30 NOTE — LETTER BODY
[Dear  ___] : Dear  [unfilled], [Courtesy Letter:] : I had the pleasure of seeing your patient, [unfilled], in my office today. [Please see my note below.] : Please see my note below. [Sincerely,] : Sincerely, [FreeTextEntry3] : Flaco Baptiste MD\par   of Urology\par  [facility]\par  \par  [facility]\par  Offices:\par  [address] [facility] [address] [facility], [facility]\par  [address] [address]\par  [address] [facility], [address]\par  \par  TEL: [phone]\par  FAX: [phone]

## 2024-09-30 NOTE — ADDENDUM
[FreeTextEntry1] :  Elham SUTTON assisted in documentation on 09/30/2024  acting as a scribe for Dr. Flaco Baptiste.

## 2024-09-30 NOTE — HISTORY OF PRESENT ILLNESS
[FreeTextEntry1] : Patient primarily Cantonese speaking, with limited English.  Visit conducted with help of accompanying Son who was translating.  09/27/2024:  84-year-old male presents for follow-up.  Accompanied by son.   Informed that he underwent chemotherapy in the summer of this year.   Patient is doing well.  Patient is only on Finasteride.  Flomax was stopped by cardiologist.    Is not taking Solifenacin. Has reasonable stream, daytime frequency every 2-3 hours and nocturia 1-2 times.  No bothered by urination.    Denies any urinary incontinence, hematuria, or dysuria.    83 year old male presents for follow up.  Had called to come in to discuss Renal and Bladder Ultrasound and further management.  On Flomax and Finasteride and Solifenacin 10 mg. Same urination.  Had reasonable stream. Still every hour or so during the day, nocturia 2 to 3 x.  Had more urgency and urinary incontinence than when on Myrbetriq samples.  Complained of dryness of mouth.   Myrbetriq was expensive.   Has been diagnosed with Lymphoma, getting Chemotherapy.    Seen on 10/7/22.  On Flomax and Finasteride. Had stopped and was restarted 1 week ago. No difference in urination.  Reported reasonable stream, urinated every hour or so during the day. Nocturia of 2 x.  Denied hesitancy, straining, intermittency, urgency, incontinence, sense of incomplete emptying. Denied dysuria, hematuria, lower abdominal or flank pain, fever, chills or rigors. No family history of Prostate cancer.  Has had 30 lbs weight loss in last 6 months.  Smoker. 0.5 PPD for 50 years.   Saw Dr Husain in the past.

## 2024-09-30 NOTE — ASSESSMENT
[FreeTextEntry1] : 09/27/2024:  Reviewed CT scan from 05/2024.     Patient has right renal cyst.   Patient had 1.6 cm bladder calculi and trilobar prostatic enlargement.   Discussed treatment option.     Will continue Finasteride.    Reviewed records. Discussed labs and imaging.   Benign Prostatic Hyperplasia: Discussed treatment options, will continue Flomax and Finasteride.   Overactive Bladder: Discussed treatment options, will continue Solifenacin.   Renal cyst: Discussed that Renal cysts are a common finding on routine radiological studies. Autopsy studies in patients over the age of 50 reveal greater than a 50% chance of having at least one simple renal cyst. And that renal cysts may be classified as simple or complex depending how they look on imaging. Discussed complexity of renal cysts and its implications as regards malignancy.   Kidney stone: Discussed the management options for non obstructing kidney stones- watch and wait Vs Ureteroscopy Vs Shock wave lithotripsy- if radio-opaque or ultrasound amenable.  Risks and benefits of each modality were discussed.  Patient will observe for now.    Bladder calculus: Discussed treatment options. Recommended Laser cystolithotripsy and litholapaxy. Discussed the procedure, risks and benefits of and the post operative course.  Patient agreeable.  Recommended CT Urogram. Will review CT scan and schedule next available.   Return to office in 6 months or sooner if there are any issues. Will do uroflow and check PVR.

## 2024-10-02 ENCOUNTER — TRANSCRIPTION ENCOUNTER (OUTPATIENT)
Age: 84
End: 2024-10-02

## 2024-10-18 ENCOUNTER — APPOINTMENT (OUTPATIENT)
Dept: CARDIOTHORACIC SURGERY | Facility: CLINIC | Age: 84
End: 2024-10-18
Payer: MEDICARE

## 2024-10-18 VITALS
SYSTOLIC BLOOD PRESSURE: 103 MMHG | BODY MASS INDEX: 18.85 KG/M2 | RESPIRATION RATE: 15 BRPM | OXYGEN SATURATION: 96 % | HEART RATE: 86 BPM | HEIGHT: 60 IN | WEIGHT: 96 LBS | DIASTOLIC BLOOD PRESSURE: 50 MMHG

## 2024-10-18 DIAGNOSIS — I25.10 ATHEROSCLEROTIC HEART DISEASE OF NATIVE CORONARY ARTERY W/OUT ANGINA PECTORIS: ICD-10-CM

## 2024-10-18 DIAGNOSIS — I35.0 NONRHEUMATIC AORTIC (VALVE) STENOSIS: ICD-10-CM

## 2024-10-18 PROCEDURE — 99204 OFFICE O/P NEW MOD 45 MIN: CPT

## 2024-10-18 RX ORDER — ASPIRIN 81 MG
81 TABLET, DELAYED RELEASE (ENTERIC COATED) ORAL
Refills: 0 | Status: ACTIVE | COMMUNITY

## 2024-10-18 RX ORDER — ATORVASTATIN CALCIUM 40 MG/1
40 TABLET, FILM COATED ORAL
Refills: 0 | Status: ACTIVE | COMMUNITY

## 2024-10-29 ENCOUNTER — APPOINTMENT (OUTPATIENT)
Dept: FAMILY MEDICINE | Facility: CLINIC | Age: 84
End: 2024-10-29
Payer: MEDICARE

## 2024-10-29 VITALS
HEIGHT: 60 IN | TEMPERATURE: 97.2 F | DIASTOLIC BLOOD PRESSURE: 40 MMHG | HEART RATE: 58 BPM | SYSTOLIC BLOOD PRESSURE: 63 MMHG | OXYGEN SATURATION: 92 % | BODY MASS INDEX: 18.85 KG/M2 | WEIGHT: 96 LBS

## 2024-10-29 DIAGNOSIS — N40.1 BENIGN PROSTATIC HYPERPLASIA WITH LOWER URINARY TRACT SYMPMS: ICD-10-CM

## 2024-10-29 DIAGNOSIS — I25.10 ATHEROSCLEROTIC HEART DISEASE OF NATIVE CORONARY ARTERY W/OUT ANGINA PECTORIS: ICD-10-CM

## 2024-10-29 DIAGNOSIS — Z87.898 PERSONAL HISTORY OF OTHER SPECIFIED CONDITIONS: ICD-10-CM

## 2024-10-29 DIAGNOSIS — Z01.810 ENCOUNTER FOR PREPROCEDURAL CARDIOVASCULAR EXAMINATION: ICD-10-CM

## 2024-10-29 DIAGNOSIS — I10 ESSENTIAL (PRIMARY) HYPERTENSION: ICD-10-CM

## 2024-10-29 DIAGNOSIS — N13.8 BENIGN PROSTATIC HYPERPLASIA WITH LOWER URINARY TRACT SYMPMS: ICD-10-CM

## 2024-10-29 DIAGNOSIS — I95.9 HYPOTENSION, UNSPECIFIED: ICD-10-CM

## 2024-10-29 DIAGNOSIS — R01.1 CARDIAC MURMUR, UNSPECIFIED: ICD-10-CM

## 2024-10-29 DIAGNOSIS — F17.200 NICOTINE DEPENDENCE, UNSPECIFIED, UNCOMPLICATED: ICD-10-CM

## 2024-10-29 DIAGNOSIS — Z85.79 PERSONAL HISTORY OF OTHER MALIGNANT NEOPLASMS OF LYMPHOID, HEMATOPOIETIC AND RELATED TISSUES: ICD-10-CM

## 2024-10-29 DIAGNOSIS — E78.00 PURE HYPERCHOLESTEROLEMIA, UNSPECIFIED: ICD-10-CM

## 2024-10-29 DIAGNOSIS — F17.210 NICOTINE DEPENDENCE, CIGARETTES, UNCOMPLICATED: ICD-10-CM

## 2024-10-29 DIAGNOSIS — I35.0 NONRHEUMATIC AORTIC (VALVE) STENOSIS: ICD-10-CM

## 2024-10-29 DIAGNOSIS — Z86.2 PERSONAL HISTORY OF DISEASES OF THE BLOOD AND BLOOD-FORMING ORGANS AND CERTAIN DISORDERS INVOLVING THE IMMUNE MECHANISM: ICD-10-CM

## 2024-10-29 PROCEDURE — G2211 COMPLEX E/M VISIT ADD ON: CPT

## 2024-10-29 PROCEDURE — 99204 OFFICE O/P NEW MOD 45 MIN: CPT

## 2024-10-30 LAB
ALBUMIN SERPL ELPH-MCNC: 4.5 G/DL
ALP BLD-CCNC: 140 U/L
ALT SERPL-CCNC: 8 U/L
ANION GAP SERPL CALC-SCNC: 14 MMOL/L
AST SERPL-CCNC: 11 U/L
BILIRUB SERPL-MCNC: 0.3 MG/DL
BUN SERPL-MCNC: 17 MG/DL
CALCIUM SERPL-MCNC: 9.5 MG/DL
CHLORIDE SERPL-SCNC: 108 MMOL/L
CHOLEST SERPL-MCNC: 140 MG/DL
CO2 SERPL-SCNC: 23 MMOL/L
CREAT SERPL-MCNC: 0.85 MG/DL
EGFR: 87 ML/MIN/1.73M2
GLUCOSE SERPL-MCNC: 103 MG/DL
HDLC SERPL-MCNC: 47 MG/DL
LDLC SERPL CALC-MCNC: 76 MG/DL
NONHDLC SERPL-MCNC: 93 MG/DL
POTASSIUM SERPL-SCNC: 5 MMOL/L
PROT SERPL-MCNC: 7.3 G/DL
SODIUM SERPL-SCNC: 145 MMOL/L
TRIGL SERPL-MCNC: 94 MG/DL

## 2024-10-31 LAB
T3FREE SERPL-MCNC: 3.39 PG/ML
TSH SERPL-ACNC: 0.35 UIU/ML

## 2024-11-06 RX ORDER — ASPIRIN ENTERIC COATED TABLETS 81 MG 81 MG/1
81 TABLET, DELAYED RELEASE ORAL DAILY
Qty: 90 | Refills: 1 | Status: ACTIVE | COMMUNITY
Start: 2024-11-06 | End: 1900-01-01

## 2024-11-21 ENCOUNTER — APPOINTMENT (OUTPATIENT)
Dept: CT IMAGING | Facility: CLINIC | Age: 84
End: 2024-11-21

## 2024-11-25 ENCOUNTER — APPOINTMENT (OUTPATIENT)
Dept: CT IMAGING | Facility: CLINIC | Age: 84
End: 2024-11-25

## 2024-11-25 ENCOUNTER — APPOINTMENT (OUTPATIENT)
Dept: ULTRASOUND IMAGING | Facility: CLINIC | Age: 84
End: 2024-11-25

## 2024-12-29 ENCOUNTER — INPATIENT (INPATIENT)
Facility: HOSPITAL | Age: 84
LOS: 0 days | Discharge: HOME | DRG: 204 | End: 2024-12-30
Attending: FAMILY MEDICINE | Admitting: STUDENT IN AN ORGANIZED HEALTH CARE EDUCATION/TRAINING PROGRAM
Payer: MEDICARE

## 2024-12-29 VITALS
DIASTOLIC BLOOD PRESSURE: 49 MMHG | RESPIRATION RATE: 18 BRPM | HEART RATE: 86 BPM | TEMPERATURE: 98 F | OXYGEN SATURATION: 95 % | SYSTOLIC BLOOD PRESSURE: 102 MMHG

## 2024-12-29 DIAGNOSIS — R06.02 SHORTNESS OF BREATH: ICD-10-CM

## 2024-12-29 DIAGNOSIS — Z98.890 OTHER SPECIFIED POSTPROCEDURAL STATES: Chronic | ICD-10-CM

## 2024-12-29 LAB
ADD ON TEST-SPECIMEN IN LAB: SIGNIFICANT CHANGE UP
ALBUMIN SERPL ELPH-MCNC: 4 G/DL — SIGNIFICANT CHANGE UP (ref 3.3–5)
ALP SERPL-CCNC: 119 U/L — SIGNIFICANT CHANGE UP (ref 40–120)
ALT FLD-CCNC: 16 U/L — SIGNIFICANT CHANGE UP (ref 12–78)
ANION GAP SERPL CALC-SCNC: 6 MMOL/L — SIGNIFICANT CHANGE UP (ref 5–17)
APPEARANCE UR: CLEAR — SIGNIFICANT CHANGE UP
APTT BLD: 37.3 SEC — HIGH (ref 24.5–35.6)
AST SERPL-CCNC: 10 U/L — LOW (ref 15–37)
BASOPHILS # BLD AUTO: 0 K/UL — SIGNIFICANT CHANGE UP (ref 0–0.2)
BASOPHILS NFR BLD AUTO: 0 % — SIGNIFICANT CHANGE UP (ref 0–2)
BILIRUB SERPL-MCNC: 0.5 MG/DL — SIGNIFICANT CHANGE UP (ref 0.2–1.2)
BILIRUB UR-MCNC: NEGATIVE — SIGNIFICANT CHANGE UP
BUN SERPL-MCNC: 18 MG/DL — SIGNIFICANT CHANGE UP (ref 7–23)
CALCIUM SERPL-MCNC: 8.9 MG/DL — SIGNIFICANT CHANGE UP (ref 8.5–10.1)
CHLORIDE SERPL-SCNC: 103 MMOL/L — SIGNIFICANT CHANGE UP (ref 96–108)
CO2 SERPL-SCNC: 25 MMOL/L — SIGNIFICANT CHANGE UP (ref 22–31)
COLOR SPEC: YELLOW — SIGNIFICANT CHANGE UP
CREAT SERPL-MCNC: 1.08 MG/DL — SIGNIFICANT CHANGE UP (ref 0.5–1.3)
DIFF PNL FLD: ABNORMAL
EGFR: 69 ML/MIN/1.73M2 — SIGNIFICANT CHANGE UP
EOSINOPHIL # BLD AUTO: 1.02 K/UL — HIGH (ref 0–0.5)
EOSINOPHIL NFR BLD AUTO: 7 % — HIGH (ref 0–6)
FLUAV AG NPH QL: DETECTED
FLUBV AG NPH QL: SIGNIFICANT CHANGE UP
GLUCOSE SERPL-MCNC: 142 MG/DL — HIGH (ref 70–99)
GLUCOSE UR QL: NEGATIVE MG/DL — SIGNIFICANT CHANGE UP
HCT VFR BLD CALC: 40.8 % — SIGNIFICANT CHANGE UP (ref 39–50)
HGB BLD-MCNC: 13 G/DL — SIGNIFICANT CHANGE UP (ref 13–17)
INR BLD: 1.11 RATIO — SIGNIFICANT CHANGE UP (ref 0.85–1.16)
KETONES UR-MCNC: ABNORMAL MG/DL
LACTATE SERPL-SCNC: 1.5 MMOL/L — SIGNIFICANT CHANGE UP (ref 0.7–2)
LEUKOCYTE ESTERASE UR-ACNC: NEGATIVE — SIGNIFICANT CHANGE UP
LYMPHOCYTES # BLD AUTO: 0.73 K/UL — LOW (ref 1–3.3)
LYMPHOCYTES # BLD AUTO: 5 % — LOW (ref 13–44)
MANUAL SMEAR VERIFICATION: SIGNIFICANT CHANGE UP
MCHC RBC-ENTMCNC: 29.5 PG — SIGNIFICANT CHANGE UP (ref 27–34)
MCHC RBC-ENTMCNC: 31.9 G/DL — LOW (ref 32–36)
MCV RBC AUTO: 92.7 FL — SIGNIFICANT CHANGE UP (ref 80–100)
MONOCYTES # BLD AUTO: 0.58 K/UL — SIGNIFICANT CHANGE UP (ref 0–0.9)
MONOCYTES NFR BLD AUTO: 4 % — SIGNIFICANT CHANGE UP (ref 2–14)
NEUTROPHILS # BLD AUTO: 12.26 K/UL — HIGH (ref 1.8–7.4)
NEUTROPHILS NFR BLD AUTO: 84 % — HIGH (ref 43–77)
NITRITE UR-MCNC: NEGATIVE — SIGNIFICANT CHANGE UP
NRBC # BLD: 0 /100 WBCS — SIGNIFICANT CHANGE UP (ref 0–0)
NRBC # BLD: SIGNIFICANT CHANGE UP /100 WBCS (ref 0–0)
PH UR: 6.5 — SIGNIFICANT CHANGE UP (ref 5–8)
PLAT MORPH BLD: NORMAL — SIGNIFICANT CHANGE UP
PLATELET # BLD AUTO: 201 K/UL — SIGNIFICANT CHANGE UP (ref 150–400)
POTASSIUM SERPL-MCNC: 3.8 MMOL/L — SIGNIFICANT CHANGE UP (ref 3.5–5.3)
POTASSIUM SERPL-SCNC: 3.8 MMOL/L — SIGNIFICANT CHANGE UP (ref 3.5–5.3)
PROT SERPL-MCNC: 7.9 GM/DL — SIGNIFICANT CHANGE UP (ref 6–8.3)
PROT UR-MCNC: SIGNIFICANT CHANGE UP MG/DL
PROTHROM AB SERPL-ACNC: 12.8 SEC — SIGNIFICANT CHANGE UP (ref 9.9–13.4)
RBC # BLD: 4.4 M/UL — SIGNIFICANT CHANGE UP (ref 4.2–5.8)
RBC # FLD: 13.6 % — SIGNIFICANT CHANGE UP (ref 10.3–14.5)
RBC BLD AUTO: NORMAL — SIGNIFICANT CHANGE UP
RSV RNA NPH QL NAA+NON-PROBE: SIGNIFICANT CHANGE UP
SARS-COV-2 RNA SPEC QL NAA+PROBE: SIGNIFICANT CHANGE UP
SODIUM SERPL-SCNC: 134 MMOL/L — LOW (ref 135–145)
SP GR SPEC: 1.01 — SIGNIFICANT CHANGE UP (ref 1–1.03)
TROPONIN I, HIGH SENSITIVITY RESULT: 14.73 NG/L — SIGNIFICANT CHANGE UP
UROBILINOGEN FLD QL: 1 MG/DL — SIGNIFICANT CHANGE UP (ref 0.2–1)
WBC # BLD: 14.6 K/UL — HIGH (ref 3.8–10.5)
WBC # FLD AUTO: 14.6 K/UL — HIGH (ref 3.8–10.5)

## 2024-12-29 PROCEDURE — 85027 COMPLETE CBC AUTOMATED: CPT

## 2024-12-29 PROCEDURE — 97116 GAIT TRAINING THERAPY: CPT | Mod: GP

## 2024-12-29 PROCEDURE — 70450 CT HEAD/BRAIN W/O DYE: CPT | Mod: 26,MC

## 2024-12-29 PROCEDURE — 93010 ELECTROCARDIOGRAM REPORT: CPT

## 2024-12-29 PROCEDURE — 84100 ASSAY OF PHOSPHORUS: CPT

## 2024-12-29 PROCEDURE — 36415 COLL VENOUS BLD VENIPUNCTURE: CPT

## 2024-12-29 PROCEDURE — 97162 PT EVAL MOD COMPLEX 30 MIN: CPT | Mod: GP

## 2024-12-29 PROCEDURE — 80048 BASIC METABOLIC PNL TOTAL CA: CPT

## 2024-12-29 PROCEDURE — 99285 EMERGENCY DEPT VISIT HI MDM: CPT

## 2024-12-29 PROCEDURE — 71045 X-RAY EXAM CHEST 1 VIEW: CPT | Mod: 26

## 2024-12-29 PROCEDURE — 81001 URINALYSIS AUTO W/SCOPE: CPT

## 2024-12-29 PROCEDURE — 83735 ASSAY OF MAGNESIUM: CPT

## 2024-12-29 PROCEDURE — 99223 1ST HOSP IP/OBS HIGH 75: CPT

## 2024-12-29 RX ORDER — OSELTAMIVIR 75 MG/1
75 CAPSULE ORAL
Refills: 0 | Status: DISCONTINUED | OUTPATIENT
Start: 2024-12-29 | End: 2024-12-30

## 2024-12-29 RX ORDER — SODIUM CHLORIDE 9 MG/ML
1000 INJECTION, SOLUTION INTRAVENOUS
Refills: 0 | Status: DISCONTINUED | OUTPATIENT
Start: 2024-12-29 | End: 2024-12-29

## 2024-12-29 RX ORDER — CHOLECALCIFEROL (VITAMIN D3) 10 MCG
2000 TABLET ORAL DAILY
Refills: 0 | Status: DISCONTINUED | OUTPATIENT
Start: 2024-12-29 | End: 2024-12-30

## 2024-12-29 RX ORDER — ACETAMINOPHEN 80 MG/.8ML
650 SOLUTION/ DROPS ORAL EVERY 6 HOURS
Refills: 0 | Status: DISCONTINUED | OUTPATIENT
Start: 2024-12-29 | End: 2024-12-30

## 2024-12-29 RX ORDER — GINKGO BILOBA 40 MG
3 CAPSULE ORAL AT BEDTIME
Refills: 0 | Status: DISCONTINUED | OUTPATIENT
Start: 2024-12-29 | End: 2024-12-30

## 2024-12-29 RX ORDER — CHOLECALCIFEROL (VITAMIN D3) 10 MCG
1 TABLET ORAL
Refills: 0 | DISCHARGE

## 2024-12-29 RX ORDER — FINASTERIDE 5 MG
5 TABLET ORAL DAILY
Refills: 0 | Status: DISCONTINUED | OUTPATIENT
Start: 2024-12-29 | End: 2024-12-30

## 2024-12-29 RX ORDER — ONDANSETRON 4 MG/1
4 TABLET ORAL EVERY 8 HOURS
Refills: 0 | Status: DISCONTINUED | OUTPATIENT
Start: 2024-12-29 | End: 2024-12-30

## 2024-12-29 RX ORDER — ATORVASTATIN CALCIUM 40 MG/1
40 TABLET, FILM COATED ORAL AT BEDTIME
Refills: 0 | Status: DISCONTINUED | OUTPATIENT
Start: 2024-12-29 | End: 2024-12-30

## 2024-12-29 RX ORDER — ASPIRIN 81 MG
81 TABLET, DELAYED RELEASE (ENTERIC COATED) ORAL DAILY
Refills: 0 | Status: DISCONTINUED | OUTPATIENT
Start: 2024-12-29 | End: 2024-12-30

## 2024-12-29 RX ORDER — MAG HYDROX/ALUMINUM HYD/SIMETH 200-200-20
30 SUSPENSION, ORAL (FINAL DOSE FORM) ORAL EVERY 4 HOURS
Refills: 0 | Status: DISCONTINUED | OUTPATIENT
Start: 2024-12-29 | End: 2024-12-30

## 2024-12-29 RX ORDER — SODIUM CHLORIDE 9 MG/ML
1000 INJECTION, SOLUTION INTRAVENOUS
Refills: 0 | Status: DISCONTINUED | OUTPATIENT
Start: 2024-12-29 | End: 2024-12-30

## 2024-12-29 RX ORDER — SODIUM CHLORIDE 9 MG/ML
1000 INJECTION, SOLUTION INTRAMUSCULAR; INTRAVENOUS; SUBCUTANEOUS ONCE
Refills: 0 | Status: COMPLETED | OUTPATIENT
Start: 2024-12-29 | End: 2024-12-29

## 2024-12-29 RX ORDER — ENOXAPARIN SODIUM 60 MG/.6ML
30 INJECTION INTRAVENOUS; SUBCUTANEOUS EVERY 24 HOURS
Refills: 0 | Status: DISCONTINUED | OUTPATIENT
Start: 2024-12-29 | End: 2024-12-30

## 2024-12-29 RX ADMIN — SODIUM CHLORIDE 1000 MILLILITER(S): 9 INJECTION, SOLUTION INTRAMUSCULAR; INTRAVENOUS; SUBCUTANEOUS at 14:20

## 2024-12-29 RX ADMIN — SODIUM CHLORIDE 50 MILLILITER(S): 9 INJECTION, SOLUTION INTRAVENOUS at 21:47

## 2024-12-29 RX ADMIN — ACETAMINOPHEN 650 MILLIGRAM(S): 80 SOLUTION/ DROPS ORAL at 22:58

## 2024-12-29 RX ADMIN — OSELTAMIVIR 75 MILLIGRAM(S): 75 CAPSULE ORAL at 15:08

## 2024-12-29 RX ADMIN — ATORVASTATIN CALCIUM 40 MILLIGRAM(S): 40 TABLET, FILM COATED ORAL at 22:28

## 2024-12-29 RX ADMIN — ACETAMINOPHEN 650 MILLIGRAM(S): 80 SOLUTION/ DROPS ORAL at 22:28

## 2024-12-29 RX ADMIN — SODIUM CHLORIDE 50 MILLILITER(S): 9 INJECTION, SOLUTION INTRAVENOUS at 20:15

## 2024-12-29 RX ADMIN — OSELTAMIVIR 75 MILLIGRAM(S): 75 CAPSULE ORAL at 22:29

## 2024-12-29 NOTE — H&P ADULT - NSHPPHYSICALEXAM_GEN_ALL_CORE
PHYSICAL EXAM:    T(C): 37.5 (12-29-24 @ 15:05), Max: 37.5 (12-29-24 @ 15:05)  HR: 94 (12-29-24 @ 15:05) (86 - 94)  BP: 122/66 (12-29-24 @ 15:05) (102/49 - 122/66)  RR: 23 (12-29-24 @ 15:05) (18 - 23)  SpO2: 100% (12-29-24 @ 15:05) (95% - 100%)    General: AAOx3; NAD; Weak/Frail  Head: AT/NC  ENT: Moist Mucous Membranes; No Injury  Eyes: EOMI; PERRL  Neck: Non-tender; No JVD  CVS: RRR, S1&S2, IV/VI systolic murmur  Respiratory: Lungs CTA B/L; Normal Respiratory Effort  Abdomen/GI: Soft, non-tender, non-distended, no guarding, no rebound, normal bowel sounds  : No bladder distention, No Medley  Extremities: No cyanosis, No clubbing, No edema; Chronic venous stasis changes  MSK: No CVA tenderness, Diffuse muscle atrophy  Neuro: AAOx3, CNII-XII grossly intact, non-focal  Psych: Appropriate, Cooperative  Skin: Clean, Dry and Intact

## 2024-12-29 NOTE — ED PROVIDER NOTE - ATTENDING APP SHARED VISIT CONTRIBUTION OF CARE
I, Zeke Huang DO, personally saw the patient with ACP.  I have personally performed a face to face diagnostic evaluation on this patient.  I have reviewed the ACP note and agree with the history, exam, and plan of care, except as noted.  I personally saw the patient and performed a substantive portion of the visit including all aspects of the medical decision making.

## 2024-12-29 NOTE — ED PROVIDER NOTE - OBJECTIVE STATEMENT
83 y/o male with a PMHx of ,hx lymphoma (completed chemo 8/2023), BPH, HTN HLD, aortic stenosis ,smoker presents by ambulance due to generalized weakness since this morning. Son at bedside and reports, pt woke up around 9 am, and around 10 am, pt noted to have generalized weakness, not eating, and not responding. No known fever or focal neuro deficits.

## 2024-12-29 NOTE — ED ADULT NURSE REASSESSMENT NOTE - NS ED NURSE REASSESS COMMENT FT1
Pt received from JORGE Sheridan at 2030. Pt in NAD, VSS, A+Ox4. Denies pain, sob, N/V. Son is at bedside. Pt and family updated on plan of care. Awaiting bed assignment and further orders. Care continues as ordered. Fall and safety precautions maintained. ISO for Flu maintained. Call bell within reach. Tolerating PO.

## 2024-12-29 NOTE — PATIENT PROFILE ADULT - FUNCTIONAL ASSESSMENT - BASIC MOBILITY 6.
4-calculated by average/Not able to assess (calculate score using [facility] averaging method)

## 2024-12-29 NOTE — H&P ADULT - ASSESSMENT
A/P    #Influenza A infection  #Associated acute metabolic encephalopathy. resolved on admission  #Mild hypovolemic hyponatremia  #Chronic Leukocytosis  #Debility/Deconditioning/Weakness due to above  -Med/surgery  -Tamiflu  -No hypoxia POA  -Leukocytosis appears chronic in nature. Less likely Sepsis POA  -Lactic Acid 1.5  -No pneumonia on CXRAY  -PT eval/tx  -Passive IVF and reassess tomorrow. Caution in the setting of Severe AR/Moderate AR  -Continue to monitor Cr/Electrolytes.       #Severe CAD without angina  #Severe AS  #Moderate AR  -Troponin negative  -Respiratory status stable  -Continue ASA/Statin  -Caution with IVF  -Outpatient follow up with cardiology    #BPH  -Home medication    #DVT Prophylaxis:  lovenox subq    I spent a total of 77 minutes in face-to-face time with the patient and on the floor managing patient including coordination of care. Overall 50% of the time spent in discussion of the diagnosis, counseling and treatment plan.

## 2024-12-29 NOTE — ED PROVIDER NOTE - CLINICAL SUMMARY MEDICAL DECISION MAKING FREE TEXT BOX
Zeke Huang DO (Attending): 85 y/o male with a PMHx of ,hx lymphoma (completed chemo 8/2023), BPH, HTN HLD, aortic stenosis ,smoker presents by ambulance due to generalized weakness since this morning. Son at bedside and reports, pt woke up around 9 am, and around 10 am, pt noted to have generalized weakness, not eating, and not responding. Nontoxic-appearing but weak, hemodynamically stable, not hypoxic with nonfocal physical exam.  Differential includes not limited to viral syndrome, infection, doubt intracranial pathology including stroke or ACS.  Plan for labs, EKG, urine, flu swab, chest x-ray, CT head.  Reassess

## 2024-12-29 NOTE — ED ADULT NURSE NOTE - OBJECTIVE STATEMENT
Pt BIBA from home where he lives with son c/o generalized weakness since this morning. Son at bedside and reports, pt woke up around 9 am, and around 10 am, pt noted to have generalized weakness, not eating, and not responding.

## 2024-12-29 NOTE — ED ADULT NURSE NOTE - SUICIDE SCREENING QUESTION 1
Motrin or Tylenol as needed for any fevers or chills  Plenty fluids stay well-hydrated  May use Imodium if needed for diarrhea  Follow up with PCP in 3-5 days.  Proceed to  ER if symptoms worsen.    Nutrition Tips for Relief of Diarrhea   AMBULATORY CARE:   Nutrition tips for relief of diarrhea  are diet changes you can make to help relieve or stop diarrhea. These changes include limiting or avoiding foods and liquids that are high in sugar, fat, fiber, and lactose. Lactose is a sugar found in milk products. Milk products can cause diarrhea in people who are lactose intolerant. You should also drink extra liquids to replace fluids that are lost when you have diarrhea. Diarrhea can lead to dehydration.   Foods to limit or avoid:   Dairy:      Whole milk    Half-and-half, cream, and sour cream    Regular (whole milk) ice cream    Grains:      Whole wheat and whole grain breads, pasta, cereals, and crackers    Brown and wild rice    Breads and cereals with seeds or nuts    Popcorn    Fruit and vegetables:      All raw fruits, except bananas and melon    Dried fruits, including prunes and raisins    Canned fruit in heavy syrup    Prune juice and any fruit juice with pulp    Raw vegetables, except lettuce     Fried vegetables    Corn, raw and cooked broccoli, cabbage, cauliflower, and joaquin greens    Protein:      Fried meat, poultry, and fish    High-fat luncheon meats, such as bologna    Fatty meats, such as sausage, welch, and hot dogs    Beans and nuts    Liquids:      Sodas and fruit-flavored drinks    Drinks that contain caffeine, such as energy drinks, coffee, and tea     Drinks that contain alcohol or sugar alcohol, such as sorbitol    Foods and liquids you may eat or drink:  Most people can tolerate the foods and liquids listed below. If any of them make your symptoms worse, stop eating or drinking them until you feel better. If you are lactose intolerant, avoid milk products.  Dairy:      Skim or low-fat milk  or evaporated milk    Soy milk or buttermilk     Low-fat, part-skim, and aged cheese    Yogurt, low-fat ice cream, or sherbert    Grains:  (Choose foods with less than 2 grams of dietary fiber per serving.)     White or refined flour breads, bagels, pasta, and crackers    Cold or hot cereals made from white or refined flour such as puffed rice, cornflakes, or cream of wheat    White rice    Fruit and vegetables:      Bananas or melon    Fruit juice without pulp, except prune juice    Canned fruit in juice or light syrup    Lettuce and most well-cooked vegetables without seeds or skins     Strained vegetable juice    Protein:      Tender, well-cooked meat, poultry, or fish    Well-cooked eggs or soy foods (cooked without added fat)    Smooth nut butters    Fats:  (Limit fats to less than 8 teaspoons a day)     Oil, butter, or margarine, or mayonnaise    Cream cheese or salad dressings    Liquids:      For infants, breast milk or formula    Oral rehydration solution     Decaffeinated coffee or caffeine-free teas    Soft drinks without caffeine    Other guidelines to follow:   Drink liquids as directed.  You may need to drink more liquids than usual to prevent dehydration. Ask how much liquid to drink each day and which liquids are best for you. You may need to drink an oral rehydration solution (ORS). An ORS helps replace fluids and electrolytes that you lose when you have diarrhea.    Eat small meals or snacks every 3 to 4 hours  instead of large meals. Continue eating even if you still have diarrhea. Your diarrhea will continue for a few days but should gradually go away.    Follow up with your doctor as directed:  Write down your questions so you remember to ask them during your visits.  © Copyright [employer] 2023 Information is for End User's use only and may not be sold, redistributed or otherwise used for commercial purposes.  The above information is an  only. It is not intended as medical advice  for individual conditions or treatments. Talk to your doctor, nurse or pharmacist before following any medical regimen to see if it is safe and effective for you.      Acute Diarrhea   AMBULATORY CARE:   Acute diarrhea  starts quickly and lasts a short time, usually 1 to 3 days. It can last up to 2 weeks.   Signs and symptoms that may happen with diarrhea:  You may have 3 or more episodes of diarrhea. It may be hard to control your diarrhea. You may also have any of the following:  Fever and chills    Headache or abdominal pain    Nausea and vomiting    Symptoms of dehydration such as thirst, decreased urination, dry skin, sunken eyes, or fast, pounding heartbeat    Seek care immediately if:   You feel confused.     Your heartbeat is faster than usual.     Your eyes look deeply sunken, or you have no tears when you cry.     You urinate less than usual, or your urine is dark yellow.     You have blood or mucus in your bowel movements.    You have severe abdominal pain.     You are unable to drink any liquids.    Contact your healthcare provider if:  Your symptoms do not get better with treatment.     You have a fever higher than 101.3°F (38.5°C).     You have trouble eating and drinking because you are vomiting.     Your diarrhea does not get better in 7 days.     You have questions or concerns about your condition or care.    Treatment for acute diarrhea  may include medicines to slow or stop your diarrhea. You may also need medicine to treat an infection.   Self-care:   Drink liquids as directed.  Liquids will help prevent dehydration caused by diarrhea. Ask your healthcare provider how much liquid to drink each day and which liquids are best for you. You may need to drink an oral rehydration solution (ORS). An ORS has the right amounts of water, salts, and sugar you need to replace body fluids. You can buy an ORS at most grocery stores and pharmacies.     Eat foods that are easy to digest.  Examples include rice,  lentils, cereal, bananas, potatoes, and bread. It also includes some fruits (bananas, melon), well-cooked vegetables, and lean meats. Do not eat foods high in fiber, fat, and sugar. Also, do not drink alcohol until your diarrhea is gone.    Prevent diarrhea:   Wash your hands often.  Use soap and water. Wash your hands before you eat or prepare food. Also wash your hands after you use the bathroom. Use an alcohol-based hand gel when soap and water are not available.         Keep bathroom surfaces clean.  This helps prevent the spread of germs that cause acute diarrhea.     Wash fruits and vegetables well before you eat them.  This can help remove germs that cause diarrhea. If possible, remove the skin from fruits and vegetables, or cook them well before you eat them.     Cook meat and poultry as directed.  Meat includes beef and pork. Poultry includes chicken, turkey, and duck.    Cook ground meat  to 160°F.     Cook ground poultry, whole poultry, or cuts of poultry  to at least 165°F. Remove the poultry from heat. Let it stand for 3 minutes before you eat it.     Cook whole cuts of meat other than poultry  to at least 145°F. Remove the meat from heat. Let it stand for 3 minutes before you eat it.    Wash dishes that have touched raw meat or poultry with hot water and soap.  This includes cutting boards, utensils, dishes, and serving containers.     Place raw or cooked meat or poultry in the refrigerator as soon as possible.  Bacteria can grow in meat that is left at room temperature too long.     Do not eat raw or undercooked oysters, clams, or mussels.  These foods may be contaminated and cause infection.     Drink only filtered or treated water when you travel.  Do not put ice in your drinks. Drink bottled water whenever possible.    Follow up with your doctor as directed:  Write down your questions so you remember to ask them during your visits.  © Copyright [employer] 2023 Information is for End User's use only  and may not be sold, redistributed or otherwise used for commercial purposes.  The above information is an  only. It is not intended as medical advice for individual conditions or treatments. Talk to your doctor, nurse or pharmacist before following any medical regimen to see if it is safe and effective for you.      Acute Nausea and Vomiting   AMBULATORY CARE:   Acute nausea and vomiting  means the nausea and vomiting starts suddenly, gets worse quickly, and lasts a short time. There are many possible causes of acute nausea and vomiting.  Call your local emergency number (911 in the ) if:   You have chest pain.    You have severe pain or cramping in your abdomen.    Your vision is blurred.    You are confused, have a high fever, or a stiff neck.    You have bright red blood coming from your rectum.    Your vomit smells like bowel movement.    Seek care immediately if:   You have a severe headache or pain.    You are dizzy, cold, and thirsty, and your eyes and mouth are dry.    You are urinating very little or not at all.    You are dizzy or lightheaded when you stand up.    You see blood or material that looks like coffee grounds in your vomit.    Call your doctor if:   You continue to vomit for more than 48 hours.    Your nausea and vomiting does not get better or go away after you use medicine.    You have questions or concerns about your condition or care.    Treatment:  The first goal of treatment for nausea and vomiting is to prevent or treat dehydration. Treatment also depends on the cause of the nausea and vomiting. Any medical condition causing your nausea and vomiting will also be treated. You may need one or more of the following:  Medicines  may be given to calm your stomach and stop your vomiting. You may also need medicines to help empty your stomach and bowels.    IV fluids  may be given to replace lost fluids and electrolytes. This may be needed it you cannot drink liquids.    A  nasogastric (NG) tube  may be needed if your nausea and vomiting is severe. The NG tube is put into your nose and moved down your throat until it reaches your stomach. Liquid, nutrition, or medicine may be given through an NG tube. The tube may instead be attached to suction if healthcare providers need to keep your stomach empty.    Manage your symptoms:   Rest as much as you can.  Too much activity can make your nausea worse.    Drink more liquids to prevent dehydration.  Take small sips. Try drinks such as ginger ale, lemonade, water, or tea. Your provider may recommend that you drink an oral rehydration solution (ORS). ORS contains water, salts, and sugar that are needed to replace the lost body fluids.    Eat smaller meals, more often.  Try bland foods and avoid spices or strong flavors    Do not drink alcohol.  Alcohol may upset or irritate your stomach.    Follow up with your doctor as directed:  Write down your questions so you remember to ask them during your visits.  © Copyright [employer] 2023 Information is for End User's use only and may not be sold, redistributed or otherwise used for commercial purposes.  The above information is an  only. It is not intended as medical advice for individual conditions or treatments. Talk to your doctor, nurse or pharmacist before following any medical regimen to see if it is safe and effective for you.      Gastroenteritis   AMBULATORY CARE:   Gastroenteritis , or stomach flu, is an infection of the stomach and intestines. It is caused by bacteria, parasites, or viruses.       Common symptoms include the following:   Diarrhea or gas    Nausea, vomiting, or poor appetite    Abdominal cramps, pain, or gurgling    Fever    Tiredness or weakness    Headaches or muscle aches with any of the above symptoms    Call 911 for any of the following:   You have trouble breathing or a very fast pulse.      Seek care immediately if:   You see blood in your  diarrhea.    You cannot stop vomiting.    You have not urinated for 12 hours.     You feel like you are going to faint.    Contact your healthcare provider if:   You have a fever.    You continue to vomit or have diarrhea, even after treatment.    You see worms in your diarrhea.    Your mouth or eyes are dry. You are not urinating as much or as often.    You have questions or concerns about your condition or care.    Treatment for gastroenteritis  may include medicines to slow or stop your diarrhea or vomiting. You may also need medicines to treat an infection caused by bacteria or a parasite.  Manage your symptoms:   Drink liquids as directed.  Ask your healthcare provider how much liquid to drink each day, and which liquids are best for you. You may also need to drink an oral rehydration solution (ORS). An ORS has the right amounts of sugar, salt, and minerals in water to replace body fluids.    Eat bland foods.  When you feel hungry, begin eating soft, bland foods. Examples are bananas, clear soup, potatoes, and applesauce. Do not have dairy products, alcohol, sugary drinks, or drinks with caffeine until you feel better.    Rest as much as possible.  Slowly start to do more each day when you begin to feel better.    Prevent the spread of germs:  Gastroenteritis can spread easily. Keep yourself, your family, and your surroundings clean to help prevent the spread of gastroenteritis:  Wash your hands often.  Use soap and water. Wash your hands after you use the bathroom, change a child's diapers, or sneeze. Wash your hands before you prepare or eat food.         Clean surfaces and do laundry often.  Wash your clothes and towels separately from the rest of the laundry. Clean surfaces in your home with antibacterial  or bleach.    Clean food thoroughly and cook safely.  Wash raw vegetables before you cook. Cook meat, fish, and eggs fully. Do not use the same dishes for raw meat as you do for other foods.  Refrigerate any leftover food immediately.    Be aware when you camp or travel.  Drink only clean water. Do not drink from rivers or lakes unless you purify or boil the water first. When you travel, drink bottled water and do not add ice. Do not eat fruit that has not been peeled. Do not eat raw fish or meat that is not fully cooked.    Follow up with your doctor as directed:  Write down your questions so you remember to ask them during your visits.  © Copyright [employer] 2023 Information is for End User's use only and may not be sold, redistributed or otherwise used for commercial purposes.  The above information is an  only. It is not intended as medical advice for individual conditions or treatments. Talk to your doctor, nurse or pharmacist before following any medical regimen to see if it is safe and effective for you.     No

## 2024-12-29 NOTE — H&P ADULT - NSHPLABSRESULTS_GEN_ALL_CORE
13.0   14.60 )-----------( 201      ( 29 Dec 2024 13:24 )             40.8     12-29    134[L]  |  103  |  18  ----------------------------<  142[H]  3.8   |  25  |  1.08    Ca    8.9      29 Dec 2024 13:24    TPro  7.9  /  Alb  4.0  /  TBili  0.5  /  DBili  x   /  AST  10[L]  /  ALT  16  /  AlkPhos  119  12-29      CAPILLARY BLOOD GLUCOSE    Troponin I, High Sensitivity (12.29.24 @ 13:24)   Troponin I, High Sensitivity Result: 14.73: Lactate, Blood (12.29.24 @ 13:24)   Lactate, Blood: 1.5 mmol/LFluA/FluB/RSV/COVID PCR (12.29.24 @ 13:24)   SARS-CoV-2 Result: NotDete: This Respiratory Panel uses polymerase chain reaction (PCR) to detect for   influenza A; influenza B; respiratory syncytial virus; and SARS-CoV-2.   This test was validated by [facility] and is in use under the FDA   Emergency Use Authorization (EUA) for clinical labs CLIA-certified to   perform high complexity testing. Test results should be correlated with   clinical presentation, patient history, and epidemiology.  Influenza A Result: Detected  Influenza B Result: NotDete  Resp Syn Virus Result: NotDete      RADIOLOGY:    < from: Xray Chest 1 View-PORTABLE IMMEDIATE (12.29.24 @ 14:02) >    IMPRESSION: No focal consolidation is seen.    < end of copied text >    < from: CT Head No Cont (12.29.24 @ 14:00) >    IMPRESSION:  No acute intracranial hemorrhage, mass effect, or midline shift.    < end of copied text >    EKG:    EKG (11/29): NSR with LVH, secondary repolarization changes and non-specific ST changes.     I personally reviewed labs, imaging, ekg, orders and vitals.

## 2024-12-29 NOTE — ED PROVIDER NOTE - QTC
Called patient and patient explained that he is having diarrhea. Patient scheduled an appointment with NP   [phone]

## 2024-12-29 NOTE — PHARMACOTHERAPY INTERVENTION NOTE - COMMENTS
Medication reconciliation complete.  Home medications reviewed with patient's son at bedside and confirmed against Dr. First med hx.  Osman reports no known medication allergies.    Home Medications:  cholecalciferol 50 mcg (2000 intl units) oral tablet: 1 tab(s) orally once a day (29 Dec 2024 16:34)  finasteride 5 mg oral tablet: 1 tab(s) orally once a day (29 Dec 2024 16:34)

## 2024-12-29 NOTE — H&P ADULT - HISTORY OF PRESENT ILLNESS
84M with PMH of significant CAD, Severe AS, Moderate AR, BPH presents with weakness and confusion. Patient AAOx3-4 at the time of admission but as per ER report and in dicussion with son, patient with increased confusion in last 24 hours. Weakness has been slightly more pronounced in the last 24 hours as well but patient with overall generalized weakness prior to that since the last hospitalization end of September-beginning of october. FERNANDEZ chronic. Denies fever, chills, chest pain, nausea, vomiting. Unnintentional weight loss unable to quantify in the last several months.     In the ER Tmax 99.5, HR 86-94, /49, RR 18-23, SpO2 95% on RA. WBC 14.6 (Appears to run 13-14K at baseline), Coags unremarkable, Na 134, Lactate 1.5, Troponin negative. Influenza A +, CT head negative for acute process. CXRAY negative for acute cardiopulmonary process.

## 2024-12-29 NOTE — PATIENT PROFILE ADULT - FALL HARM RISK - HARM RISK INTERVENTIONS
Assistance with ambulation/Assistance OOB with selected safe patient handling equipment/Communicate Risk of Fall with Harm to all staff/Monitor for mental status changes/Monitor gait and stability/Provide patient with walking aids - walker, cane, crutches/Reinforce activity limits and safety measures with patient and family/Reorient to person, place and time as needed/Sit up slowly, dangle for a short time, stand at bedside before walking/Tailored Fall Risk Interventions/Use of alarms - bed, chair and/or voice tab/Visual Cue: Yellow wristband and red socks/Bed in lowest position, wheels locked, appropriate side rails in place/Call bell, personal items and telephone in reach/Instruct patient to call for assistance before getting out of bed or chair/Non-slip footwear when patient is out of bed/[place] to call system/Physically safe environment - no spills, clutter or unnecessary equipment/Purposeful Proactive Rounding/Room/bathroom lighting operational, light cord in reach

## 2024-12-29 NOTE — ED ADULT NURSE NOTE - NSFALLRISKINTERV_ED_ALL_ED

## 2024-12-30 ENCOUNTER — TRANSCRIPTION ENCOUNTER (OUTPATIENT)
Age: 84
End: 2024-12-30

## 2024-12-30 VITALS
DIASTOLIC BLOOD PRESSURE: 61 MMHG | TEMPERATURE: 99 F | HEART RATE: 78 BPM | OXYGEN SATURATION: 95 % | SYSTOLIC BLOOD PRESSURE: 132 MMHG | RESPIRATION RATE: 18 BRPM

## 2024-12-30 LAB
ANION GAP SERPL CALC-SCNC: 3 MMOL/L — LOW (ref 5–17)
BACTERIA # UR AUTO: NEGATIVE /HPF — SIGNIFICANT CHANGE UP
BUN SERPL-MCNC: 15 MG/DL — SIGNIFICANT CHANGE UP (ref 7–23)
CALCIUM SERPL-MCNC: 8.1 MG/DL — LOW (ref 8.5–10.1)
CAST: 0 /LPF — SIGNIFICANT CHANGE UP (ref 0–4)
CHLORIDE SERPL-SCNC: 107 MMOL/L — SIGNIFICANT CHANGE UP (ref 96–108)
CO2 SERPL-SCNC: 26 MMOL/L — SIGNIFICANT CHANGE UP (ref 22–31)
CREAT SERPL-MCNC: 0.69 MG/DL — SIGNIFICANT CHANGE UP (ref 0.5–1.3)
EGFR: 92 ML/MIN/1.73M2 — SIGNIFICANT CHANGE UP
GLUCOSE SERPL-MCNC: 87 MG/DL — SIGNIFICANT CHANGE UP (ref 70–99)
HCT VFR BLD CALC: 38.1 % — LOW (ref 39–50)
HGB BLD-MCNC: 12.2 G/DL — LOW (ref 13–17)
MAGNESIUM SERPL-MCNC: 2.1 MG/DL — SIGNIFICANT CHANGE UP (ref 1.6–2.6)
MCHC RBC-ENTMCNC: 29.4 PG — SIGNIFICANT CHANGE UP (ref 27–34)
MCHC RBC-ENTMCNC: 32 G/DL — SIGNIFICANT CHANGE UP (ref 32–36)
MCV RBC AUTO: 91.8 FL — SIGNIFICANT CHANGE UP (ref 80–100)
PHOSPHATE SERPL-MCNC: 3 MG/DL — SIGNIFICANT CHANGE UP (ref 2.5–4.5)
PLATELET # BLD AUTO: 174 K/UL — SIGNIFICANT CHANGE UP (ref 150–400)
POTASSIUM SERPL-MCNC: 3.6 MMOL/L — SIGNIFICANT CHANGE UP (ref 3.5–5.3)
POTASSIUM SERPL-SCNC: 3.6 MMOL/L — SIGNIFICANT CHANGE UP (ref 3.5–5.3)
RBC # BLD: 4.15 M/UL — LOW (ref 4.2–5.8)
RBC # FLD: 13.7 % — SIGNIFICANT CHANGE UP (ref 10.3–14.5)
RBC CASTS # UR COMP ASSIST: 3 /HPF — SIGNIFICANT CHANGE UP (ref 0–4)
SODIUM SERPL-SCNC: 136 MMOL/L — SIGNIFICANT CHANGE UP (ref 135–145)
SQUAMOUS # UR AUTO: 1 /HPF — SIGNIFICANT CHANGE UP (ref 0–5)
WBC # BLD: 12.15 K/UL — HIGH (ref 3.8–10.5)
WBC # FLD AUTO: 12.15 K/UL — HIGH (ref 3.8–10.5)
WBC UR QL: 0 /HPF — SIGNIFICANT CHANGE UP (ref 0–5)

## 2024-12-30 PROCEDURE — 99239 HOSP IP/OBS DSCHRG MGMT >30: CPT

## 2024-12-30 RX ORDER — OSELTAMIVIR 75 MG/1
1 CAPSULE ORAL
Qty: 8 | Refills: 0
Start: 2024-12-30 | End: 2025-01-02

## 2024-12-30 RX ORDER — CHLORHEXIDINE GLUCONATE 1.2 MG/ML
1 RINSE ORAL
Refills: 0 | Status: DISCONTINUED | OUTPATIENT
Start: 2024-12-30 | End: 2024-12-30

## 2024-12-30 RX ADMIN — Medication 2000 UNIT(S): at 11:38

## 2024-12-30 RX ADMIN — Medication 81 MILLIGRAM(S): at 11:39

## 2024-12-30 RX ADMIN — ENOXAPARIN SODIUM 30 MILLIGRAM(S): 60 INJECTION INTRAVENOUS; SUBCUTANEOUS at 06:16

## 2024-12-30 RX ADMIN — OSELTAMIVIR 75 MILLIGRAM(S): 75 CAPSULE ORAL at 11:39

## 2024-12-30 RX ADMIN — Medication 5 MILLIGRAM(S): at 11:39

## 2024-12-30 NOTE — DISCHARGE NOTE PROVIDER - NSDCFUSCHEDAPPT_GEN_ALL_CORE_FT
Palla, Venugopal R  [facility]  CARDIOLOGY [address]  Scheduled Appointment: 01/06/2025    Cate Haynes  [facility]  [facility]  Scheduled Appointment: 01/23/2025    Kev Pitts  [facility] [address] [facility]  Scheduled Appointment: 01/27/2025

## 2024-12-30 NOTE — DISCHARGE NOTE NURSING/CASE MANAGEMENT/SOCIAL WORK - NSDCPEFALRISK_GEN_ALL_CORE
For information on Fall & Injury Prevention, visit: [email or web address] OR  [email or web address] OR  https://www.cdc.gov/steadi/patient.html

## 2024-12-30 NOTE — DISCHARGE NOTE PROVIDER - NSDCMRMEDTOKEN_GEN_ALL_CORE_FT
aspirin 81 mg oral delayed release tablet: 1 tab(s) orally once a day  atorvastatin 40 mg oral tablet: 1 tab(s) orally once a day (at bedtime)  cholecalciferol 50 mcg (2000 intl units) oral tablet: 1 tab(s) orally once a day  finasteride 5 mg oral tablet: 1 tab(s) orally once a day  oseltamivir 75 mg oral capsule: 1 cap(s) orally 2 times a day

## 2024-12-30 NOTE — DISCHARGE NOTE NURSING/CASE MANAGEMENT/SOCIAL WORK - PATIENT PORTAL LINK FT
You can access the FollowMyHealth Patient Portal offered by [facility] by registering at the following website: [email or web address]. By joining [facility]’s FollowMyHealth portal, you will also be able to view your health information using other applications (apps) compatible with our system.

## 2024-12-30 NOTE — PROGRESS NOTE ADULT - SUBJECTIVE AND OBJECTIVE BOX
SUBJECTIVE:    CHIEF COMPLAINT:  Patient is a 84y old  Male who presents with a chief complaint of Weakness/Confusion (29 Dec 2024 15:39)      HPI:  84M with PMH of significant CAD, Severe AS, Moderate AR, BPH presents with weakness and confusion. Patient AAOx3-4 at the time of admission but as per ER report and in dicussion with son, patient with increased confusion in last 24 hours. Weakness has been slightly more pronounced in the last 24 hours as well but patient with overall generalized weakness prior to that since the last hospitalization end of September-beginning of october. FERNANDEZ chronic. Denies fever, chills, chest pain, nausea, vomiting. Unnintentional weight loss unable to quantify in the last several months.     In the ER Tmax 99.5, HR 86-94, /49, RR 18-23, SpO2 95% on RA. WBC 14.6 (Appears to run 13-14K at baseline), Coags unremarkable, Na 134, Lactate 1.5, Troponin negative. Influenza A +, CT head negative for acute process. CXRAY negative for acute cardiopulmonary process.  (29 Dec 2024 15:39)      Interval HPI and Overnight Events: Pt appears better. Eating well.    REVIEW OF SYSTEMS:  CONSTITUTIONAL: No weakness, fevers or chills  EYES/ENT: No visual changes;  No vertigo or throat pain   NECK: No pain or stiffness  RESPIRATORY: No cough, wheezing, hemoptysis; No shortness of breath  CARDIOVASCULAR: No chest pain or palpitations  GASTROINTESTINAL: No abdominal or epigastric pain. No nausea, vomiting, or hematemesis; No diarrhea or constipation. No melena or hematochezia.  GENITOURINARY: No dysuria, frequency or hematuria  NEUROLOGICAL: No numbness or weakness  SKIN: No itching, burning, rashes, or lesions   All other review of systems is negative unless indicated above    OBJECTIVE    Vital Signs Last 24 Hrs  T(C): 36.7 (30 Dec 2024 08:18), Max: 37.8 (29 Dec 2024 21:55)  T(F): 98.1 (30 Dec 2024 08:18), Max: 100 (29 Dec 2024 21:55)  HR: 67 (30 Dec 2024 08:18) (67 - 94)  BP: 128/58 (30 Dec 2024 08:18) (102/49 - 128/58)  BP(mean): 82 (29 Dec 2024 15:05) (82 - 82)  RR: 18 (30 Dec 2024 08:18) (18 - 23)  SpO2: 94% (30 Dec 2024 08:18) (93% - 100%)    Parameters below as of 30 Dec 2024 08:18  Patient On (Oxygen Delivery Method): room air    MEDICATIONS  (STANDING):  aspirin enteric coated 81 milliGRAM(s) Oral daily  atorvastatin 40 milliGRAM(s) Oral at bedtime  chlorhexidine 4% Liquid 1 Application(s) Topical <User Schedule>  cholecalciferol 2000 Unit(s) Oral daily  enoxaparin Injectable 30 milliGRAM(s) SubCutaneous every 24 hours  finasteride 5 milliGRAM(s) Oral daily  lactated ringers. 1000 milliLiter(s) (50 mL/Hr) IV Continuous <Continuous>  oseltamivir 75 milliGRAM(s) Oral two times a day    LABS:                         12.2   12.15 )-----------( 174      ( 30 Dec 2024 08:45 )             38.1     12-30    136  |  107  |  15  ----------------------------<  87  3.6   |  26  |  0.69    Ca    8.1[L]      30 Dec 2024 08:45  Phos  3.0     12-30  Mg     2.1     12-30    TPro  7.9  /  Alb  4.0  /  TBili  0.5  /  DBili  x   /  AST  10[L]  /  ALT  16  /  AlkPhos  119  12-29    Urinalysis Basic - ( 30 Dec 2024 08:45 )  Color: x / Appearance: x / SG: x / pH: x  Gluc: 87 mg/dL / Ketone: x  / Bili: x / Urobili: x   Blood: x / Protein: x / Nitrite: x   Leuk Esterase: x / RBC: x / WBC x   Sq Epi: x / Non Sq Epi: x / Bacteria: x    PT/INR - ( 29 Dec 2024 14:28 )   PT: 12.8 sec;   INR: 1.11 ratio    PTT - ( 29 Dec 2024 14:28 )  PTT:37.3 sec

## 2024-12-30 NOTE — PHYSICAL THERAPY INITIAL EVALUATION ADULT - SITTING BALANCE: STATIC
Patient notified of  recommendations, patient gave verbal understanding  
Patients plan will not cover Chantix without documented trial and failure of both Bupropion (generic Zyban) and at least one OTC nicotine product.    Please advise      
good balance

## 2024-12-30 NOTE — PROGRESS NOTE ADULT - ASSESSMENT
A/P    Assessment:  Influenza A infection  Associated acute metabolic encephalopathy. resolved on admission  Mild hypovolemic hyponatremia  Chronic Leukocytosis  Debility/Deconditioning/Weakness due to above  Severe CAD without angina  Severe AS  Moderate AR  BPH    Plan:  Tamiflu  No hypoxia POA  Leukocytosis appears chronic in nature. Less likely Sepsis POA  Lactic Acid 1.5  No pneumonia on CXR  PT eval/tx  D/c IVF  Continue to monitor Cr/Electrolytes.   Continue ASA/Statin  Outpatient follow up with cardiology  Home medication    DVT Prophylaxis:    lovenox subq    Full Code

## 2024-12-30 NOTE — DISCHARGE NOTE PROVIDER - CARE PROVIDER_API CALL
Kev Pitts  [address], [address] [address]  Phone: [phone]  Fax: [phone]  Follow Up Time:

## 2024-12-30 NOTE — PHYSICAL THERAPY INITIAL EVALUATION ADULT - PERTINENT HX OF CURRENT PROBLEM, REHAB EVAL
84M with PMH of significant CAD, Severe AS, Moderate AR, BPH presents with weakness and confusion. Patient AAOx3-4 at the time of admission but as per ER report and in dicussion with son, patient with increased confusion in last 24 hours. Weakness has been slightly more pronounced in the last 24 hours as well but patient with overall generalized weakness prior to that since the last hospitalization end of September-beginning of october. FERNANDEZ chronic. Denies fever, chills, chest pain, nausea, vomiting. Unnintentional weight loss unable to quantify in the last several months. 84M with PMH of significant CAD, Severe AS, Moderate AR, BPH presents with weakness and confusion. Patient AAOx3-4 at the time of admission but as per ER report and in dicussion with son, patient with increased confusion in last 24 hours. Weakness has been slightly more pronounced in the last 24 hours as well but patient with overall generalized weakness prior to that since the last hospitalization end of September-beginning of october. FERNANDEZ chronic. Denies fever, chills, chest pain, nausea, vomiting. Unintentional weight loss unable to quantify in the last several months.

## 2024-12-30 NOTE — DISCHARGE NOTE PROVIDER - HOSPITAL COURSE
Pt admitted with weakness after influenza A. Pt admitted to medical bed. IVF given. Supportive care. Seen by physical therapy. MS improved Pt able to ambulate without assistance with PT felt to be safe to go back home.  Condition on discharge improved

## 2024-12-30 NOTE — DISCHARGE NOTE NURSING/CASE MANAGEMENT/SOCIAL WORK - FINANCIAL ASSISTANCE
[facility] provides services at a reduced cost to those who are determined to be eligible through [facility]’s financial assistance program. Information regarding [facility]’s financial assistance program can be found by going to [email or web address] or by calling [phone].

## 2025-01-02 ENCOUNTER — NON-APPOINTMENT (OUTPATIENT)
Age: 85
End: 2025-01-02

## 2025-01-06 ENCOUNTER — APPOINTMENT (OUTPATIENT)
Dept: CARDIOLOGY | Facility: CLINIC | Age: 85
End: 2025-01-06
Payer: MEDICARE

## 2025-01-06 ENCOUNTER — NON-APPOINTMENT (OUTPATIENT)
Age: 85
End: 2025-01-06

## 2025-01-06 VITALS
HEART RATE: 72 BPM | SYSTOLIC BLOOD PRESSURE: 100 MMHG | OXYGEN SATURATION: 99 % | WEIGHT: 92 LBS | HEIGHT: 60 IN | DIASTOLIC BLOOD PRESSURE: 50 MMHG | BODY MASS INDEX: 18.06 KG/M2

## 2025-01-06 DIAGNOSIS — E87.1 HYPO-OSMOLALITY AND HYPONATREMIA: ICD-10-CM

## 2025-01-06 DIAGNOSIS — I10 ESSENTIAL (PRIMARY) HYPERTENSION: ICD-10-CM

## 2025-01-06 DIAGNOSIS — I35.1 NONRHEUMATIC AORTIC (VALVE) INSUFFICIENCY: ICD-10-CM

## 2025-01-06 DIAGNOSIS — I25.10 ATHEROSCLEROTIC HEART DISEASE OF NATIVE CORONARY ARTERY W/OUT ANGINA PECTORIS: ICD-10-CM

## 2025-01-06 DIAGNOSIS — N40.0 BENIGN PROSTATIC HYPERPLASIA WITHOUT LOWER URINARY TRACT SYMPTOMS: ICD-10-CM

## 2025-01-06 DIAGNOSIS — E78.5 HYPERLIPIDEMIA, UNSPECIFIED: ICD-10-CM

## 2025-01-06 DIAGNOSIS — Z01.810 ENCOUNTER FOR PREPROCEDURAL CARDIOVASCULAR EXAMINATION: ICD-10-CM

## 2025-01-06 DIAGNOSIS — I35.0 NONRHEUMATIC AORTIC (VALVE) STENOSIS: ICD-10-CM

## 2025-01-06 DIAGNOSIS — D72.829 ELEVATED WHITE BLOOD CELL COUNT, UNSPECIFIED: ICD-10-CM

## 2025-01-06 DIAGNOSIS — I25.10 ATHEROSCLEROTIC HEART DISEASE OF NATIVE CORONARY ARTERY WITHOUT ANGINA PECTORIS: ICD-10-CM

## 2025-01-06 DIAGNOSIS — Z79.82 LONG TERM (CURRENT) USE OF ASPIRIN: ICD-10-CM

## 2025-01-06 DIAGNOSIS — J10.1 INFLUENZA DUE TO OTHER IDENTIFIED INFLUENZA VIRUS WITH OTHER RESPIRATORY MANIFESTATIONS: ICD-10-CM

## 2025-01-06 PROCEDURE — 99214 OFFICE O/P EST MOD 30 MIN: CPT

## 2025-01-06 PROCEDURE — 93000 ELECTROCARDIOGRAM COMPLETE: CPT | Mod: NC

## 2025-01-06 PROCEDURE — G2211 COMPLEX E/M VISIT ADD ON: CPT

## 2025-01-06 RX ORDER — MULTIVIT-MIN/FOLIC/VIT K/LYCOP 400-300MCG
50 MCG TABLET ORAL DAILY
Refills: 0 | Status: ACTIVE | COMMUNITY

## 2025-01-13 ENCOUNTER — APPOINTMENT (OUTPATIENT)
Dept: FAMILY MEDICINE | Facility: CLINIC | Age: 85
End: 2025-01-13
Payer: MEDICARE

## 2025-01-13 VITALS
HEART RATE: 70 BPM | WEIGHT: 92 LBS | TEMPERATURE: 98.6 F | SYSTOLIC BLOOD PRESSURE: 100 MMHG | DIASTOLIC BLOOD PRESSURE: 70 MMHG | OXYGEN SATURATION: 93 % | BODY MASS INDEX: 18.06 KG/M2 | HEIGHT: 60 IN

## 2025-01-13 DIAGNOSIS — N40.1 BENIGN PROSTATIC HYPERPLASIA WITH LOWER URINARY TRACT SYMPMS: ICD-10-CM

## 2025-01-13 DIAGNOSIS — I10 ESSENTIAL (PRIMARY) HYPERTENSION: ICD-10-CM

## 2025-01-13 DIAGNOSIS — I95.9 HYPOTENSION, UNSPECIFIED: ICD-10-CM

## 2025-01-13 DIAGNOSIS — N13.8 BENIGN PROSTATIC HYPERPLASIA WITH LOWER URINARY TRACT SYMPMS: ICD-10-CM

## 2025-01-13 DIAGNOSIS — J11.1 INFLUENZA DUE TO UNIDENTIFIED INFLUENZA VIRUS WITH OTHER RESPIRATORY MANIFESTATIONS: ICD-10-CM

## 2025-01-13 DIAGNOSIS — E78.00 PURE HYPERCHOLESTEROLEMIA, UNSPECIFIED: ICD-10-CM

## 2025-01-13 PROCEDURE — 99495 TRANSJ CARE MGMT MOD F2F 14D: CPT

## 2025-01-27 ENCOUNTER — APPOINTMENT (OUTPATIENT)
Dept: CARDIOLOGY | Facility: CLINIC | Age: 85
End: 2025-01-27

## 2025-01-29 DIAGNOSIS — C82.90 FOLLICULAR LYMPHOMA, UNSPECIFIED, UNSPECIFIED SITE: ICD-10-CM

## 2025-01-30 ENCOUNTER — OUTPATIENT (OUTPATIENT)
Dept: OUTPATIENT SERVICES | Facility: HOSPITAL | Age: 85
LOS: 1 days | Discharge: HOME | End: 2025-01-30

## 2025-01-30 DIAGNOSIS — C82.00 FOLLICULAR LYMPHOMA GRADE I, UNSPECIFIED SITE: ICD-10-CM

## 2025-01-30 DIAGNOSIS — D72.10 EOSINOPHILIA, UNSPECIFIED: ICD-10-CM

## 2025-01-30 DIAGNOSIS — Z98.890 OTHER SPECIFIED POSTPROCEDURAL STATES: Chronic | ICD-10-CM

## 2025-02-07 ENCOUNTER — APPOINTMENT (OUTPATIENT)
Dept: HEMATOLOGY ONCOLOGY | Facility: CLINIC | Age: 85
End: 2025-02-07

## 2025-03-11 ENCOUNTER — RX RENEWAL (OUTPATIENT)
Age: 85
End: 2025-03-11

## 2025-04-16 ENCOUNTER — APPOINTMENT (OUTPATIENT)
Dept: FAMILY MEDICINE | Facility: CLINIC | Age: 85
End: 2025-04-16
Payer: MEDICARE

## 2025-04-16 ENCOUNTER — LABORATORY RESULT (OUTPATIENT)
Age: 85
End: 2025-04-16

## 2025-04-16 VITALS
SYSTOLIC BLOOD PRESSURE: 104 MMHG | OXYGEN SATURATION: 97 % | DIASTOLIC BLOOD PRESSURE: 62 MMHG | TEMPERATURE: 98.8 F | WEIGHT: 93 LBS | HEART RATE: 55 BPM | BODY MASS INDEX: 18.26 KG/M2 | HEIGHT: 60 IN

## 2025-04-16 DIAGNOSIS — N40.1 BENIGN PROSTATIC HYPERPLASIA WITH LOWER URINARY TRACT SYMPMS: ICD-10-CM

## 2025-04-16 DIAGNOSIS — Z23 ENCOUNTER FOR IMMUNIZATION: ICD-10-CM

## 2025-04-16 DIAGNOSIS — Z01.810 ENCOUNTER FOR PREPROCEDURAL CARDIOVASCULAR EXAMINATION: ICD-10-CM

## 2025-04-16 DIAGNOSIS — N13.8 BENIGN PROSTATIC HYPERPLASIA WITH LOWER URINARY TRACT SYMPMS: ICD-10-CM

## 2025-04-16 DIAGNOSIS — E78.00 PURE HYPERCHOLESTEROLEMIA, UNSPECIFIED: ICD-10-CM

## 2025-04-16 DIAGNOSIS — I25.10 ATHEROSCLEROTIC HEART DISEASE OF NATIVE CORONARY ARTERY W/OUT ANGINA PECTORIS: ICD-10-CM

## 2025-04-16 DIAGNOSIS — Z00.00 ENCOUNTER FOR GENERAL ADULT MEDICAL EXAMINATION W/OUT ABNORMAL FINDINGS: ICD-10-CM

## 2025-04-16 DIAGNOSIS — I10 ESSENTIAL (PRIMARY) HYPERTENSION: ICD-10-CM

## 2025-04-16 LAB
BILIRUB UR QL STRIP: NORMAL
CLARITY UR: CLEAR
COLLECTION METHOD: NORMAL
GLUCOSE UR-MCNC: NORMAL
HCG UR QL: 0.2 EU/DL
HGB UR QL STRIP.AUTO: ABNORMAL
KETONES UR-MCNC: NORMAL
LEUKOCYTE ESTERASE UR QL STRIP: ABNORMAL
NITRITE UR QL STRIP: NORMAL
PH UR STRIP: 6
PROT UR STRIP-MCNC: NORMAL
SP GR UR STRIP: 1.02

## 2025-04-16 PROCEDURE — 81003 URINALYSIS AUTO W/O SCOPE: CPT | Mod: QW

## 2025-04-16 PROCEDURE — 90715 TDAP VACCINE 7 YRS/> IM: CPT

## 2025-04-16 PROCEDURE — 90472 IMMUNIZATION ADMIN EACH ADD: CPT

## 2025-04-16 PROCEDURE — G0009: CPT

## 2025-04-16 PROCEDURE — 36415 COLL VENOUS BLD VENIPUNCTURE: CPT

## 2025-04-16 PROCEDURE — G0439: CPT

## 2025-04-16 PROCEDURE — 90677 PCV20 VACCINE IM: CPT

## 2025-04-17 LAB
ALBUMIN SERPL ELPH-MCNC: 4.3 G/DL
ALP BLD-CCNC: 125 U/L
ALT SERPL-CCNC: 13 U/L
ANION GAP SERPL CALC-SCNC: 12 MMOL/L
AST SERPL-CCNC: 14 U/L
BASOPHILS # BLD AUTO: 0.11 K/UL
BASOPHILS NFR BLD AUTO: 0.8 %
BILIRUB SERPL-MCNC: 0.4 MG/DL
BUN SERPL-MCNC: 19 MG/DL
CALCIUM SERPL-MCNC: 9.8 MG/DL
CHLORIDE SERPL-SCNC: 107 MMOL/L
CHOLEST SERPL-MCNC: 121 MG/DL
CO2 SERPL-SCNC: 27 MMOL/L
CREAT SERPL-MCNC: 0.82 MG/DL
EGFRCR SERPLBLD CKD-EPI 2021: 87 ML/MIN/1.73M2
EOSINOPHIL # BLD AUTO: 4.35 K/UL
EOSINOPHIL NFR BLD AUTO: 33.1 %
GLUCOSE SERPL-MCNC: 119 MG/DL
HCT VFR BLD CALC: 41.5 %
HDLC SERPL-MCNC: 46 MG/DL
HGB BLD-MCNC: 12.6 G/DL
IMM GRANULOCYTES NFR BLD AUTO: 0.3 %
LDLC SERPL-MCNC: 63 MG/DL
LYMPHOCYTES # BLD AUTO: 3.15 K/UL
LYMPHOCYTES NFR BLD AUTO: 24 %
MAN DIFF?: NORMAL
MCHC RBC-ENTMCNC: 29.6 PG
MCHC RBC-ENTMCNC: 30.4 G/DL
MCV RBC AUTO: 97.6 FL
MONOCYTES # BLD AUTO: 0.61 K/UL
MONOCYTES NFR BLD AUTO: 4.6 %
NEUTROPHILS # BLD AUTO: 4.87 K/UL
NEUTROPHILS NFR BLD AUTO: 37.2 %
NONHDLC SERPL-MCNC: 75 MG/DL
PLATELET # BLD AUTO: 237 K/UL
POTASSIUM SERPL-SCNC: 4.3 MMOL/L
PROT SERPL-MCNC: 7.4 G/DL
RBC # BLD: 4.25 M/UL
RBC # FLD: 14.6 %
SODIUM SERPL-SCNC: 145 MMOL/L
T3FREE SERPL-MCNC: 2.59 PG/ML
T4 FREE SERPL-MCNC: 1.2 NG/DL
TRIGL SERPL-MCNC: 54 MG/DL
TSH SERPL-ACNC: 0.69 UIU/ML
WBC # FLD AUTO: 13.13 K/UL

## 2025-04-21 ENCOUNTER — APPOINTMENT (OUTPATIENT)
Dept: CARDIOLOGY | Facility: CLINIC | Age: 85
End: 2025-04-21

## 2025-05-02 ENCOUNTER — APPOINTMENT (OUTPATIENT)
Dept: UROLOGY | Facility: CLINIC | Age: 85
End: 2025-05-02

## 2025-05-02 DIAGNOSIS — N40.1 BENIGN PROSTATIC HYPERPLASIA WITH LOWER URINARY TRACT SYMPMS: ICD-10-CM

## 2025-05-02 DIAGNOSIS — N21.0 CALCULUS IN BLADDER: ICD-10-CM

## 2025-05-02 DIAGNOSIS — N28.1 CYST OF KIDNEY, ACQUIRED: ICD-10-CM

## 2025-05-02 DIAGNOSIS — N13.8 BENIGN PROSTATIC HYPERPLASIA WITH LOWER URINARY TRACT SYMPMS: ICD-10-CM

## 2025-05-02 PROCEDURE — 99214 OFFICE O/P EST MOD 30 MIN: CPT

## 2025-05-06 ENCOUNTER — RX RENEWAL (OUTPATIENT)
Age: 85
End: 2025-05-06

## 2025-05-06 RX ORDER — ASPIRIN 81 MG/1
81 TABLET, COATED ORAL
Qty: 90 | Refills: 1 | Status: ACTIVE | COMMUNITY
Start: 2025-05-06 | End: 1900-01-01

## 2025-05-13 ENCOUNTER — APPOINTMENT (OUTPATIENT)
Dept: CARDIOLOGY | Facility: CLINIC | Age: 85
End: 2025-05-13

## 2025-06-11 ENCOUNTER — RX RENEWAL (OUTPATIENT)
Age: 85
End: 2025-06-11

## 2025-07-14 ENCOUNTER — RX RENEWAL (OUTPATIENT)
Age: 85
End: 2025-07-14

## 2025-08-11 ENCOUNTER — RX RENEWAL (OUTPATIENT)
Age: 85
End: 2025-08-11

## 2025-08-18 ENCOUNTER — RX RENEWAL (OUTPATIENT)
Age: 85
End: 2025-08-18

## 2025-08-20 ENCOUNTER — NON-APPOINTMENT (OUTPATIENT)
Age: 85
End: 2025-08-20

## 2025-08-21 ENCOUNTER — APPOINTMENT (OUTPATIENT)
Dept: FAMILY MEDICINE | Facility: CLINIC | Age: 85
End: 2025-08-21
Payer: MEDICARE

## 2025-08-21 VITALS
OXYGEN SATURATION: 96 % | TEMPERATURE: 98.2 F | WEIGHT: 93 LBS | SYSTOLIC BLOOD PRESSURE: 118 MMHG | BODY MASS INDEX: 18.26 KG/M2 | DIASTOLIC BLOOD PRESSURE: 68 MMHG | HEIGHT: 60 IN | HEART RATE: 80 BPM

## 2025-08-21 DIAGNOSIS — E78.00 PURE HYPERCHOLESTEROLEMIA, UNSPECIFIED: ICD-10-CM

## 2025-08-21 DIAGNOSIS — I95.9 HYPOTENSION, UNSPECIFIED: ICD-10-CM

## 2025-08-21 DIAGNOSIS — N13.8 BENIGN PROSTATIC HYPERPLASIA WITH LOWER URINARY TRACT SYMPMS: ICD-10-CM

## 2025-08-21 DIAGNOSIS — N40.1 BENIGN PROSTATIC HYPERPLASIA WITH LOWER URINARY TRACT SYMPMS: ICD-10-CM

## 2025-08-21 DIAGNOSIS — I10 ESSENTIAL (PRIMARY) HYPERTENSION: ICD-10-CM

## 2025-08-21 PROCEDURE — G2211 COMPLEX E/M VISIT ADD ON: CPT

## 2025-08-21 PROCEDURE — 99214 OFFICE O/P EST MOD 30 MIN: CPT

## 2025-09-05 ENCOUNTER — APPOINTMENT (OUTPATIENT)
Dept: UROLOGY | Facility: CLINIC | Age: 85
End: 2025-09-05

## 2025-09-05 VITALS
SYSTOLIC BLOOD PRESSURE: 92 MMHG | HEIGHT: 60 IN | RESPIRATION RATE: 17 BRPM | DIASTOLIC BLOOD PRESSURE: 57 MMHG | OXYGEN SATURATION: 95 % | BODY MASS INDEX: 18.65 KG/M2 | HEART RATE: 98 BPM | WEIGHT: 95 LBS

## 2025-09-05 DIAGNOSIS — N13.8 BENIGN PROSTATIC HYPERPLASIA WITH LOWER URINARY TRACT SYMPMS: ICD-10-CM

## 2025-09-05 DIAGNOSIS — N40.1 BENIGN PROSTATIC HYPERPLASIA WITH LOWER URINARY TRACT SYMPMS: ICD-10-CM

## 2025-09-05 DIAGNOSIS — N21.0 CALCULUS IN BLADDER: ICD-10-CM

## 2025-09-05 PROCEDURE — 99214 OFFICE O/P EST MOD 30 MIN: CPT

## 2025-09-10 LAB
APPEARANCE: CLEAR
BACTERIA UR CULT: NORMAL
BACTERIA: NEGATIVE /HPF
BILIRUBIN URINE: NEGATIVE
BLOOD URINE: NEGATIVE
CAST: 1 /LPF
COLOR: YELLOW
EPITHELIAL CELLS: 1 /HPF
GLUCOSE QUALITATIVE U: NEGATIVE MG/DL
KETONES URINE: NEGATIVE MG/DL
LEUKOCYTE ESTERASE URINE: ABNORMAL
MICROSCOPIC-UA: NORMAL
NITRITE URINE: NEGATIVE
PH URINE: 6
PROTEIN URINE: NEGATIVE MG/DL
RED BLOOD CELLS URINE: 2 /HPF
REVIEW: NORMAL
SPECIFIC GRAVITY URINE: 1.02
UROBILINOGEN URINE: 0.2 MG/DL
WHITE BLOOD CELLS URINE: 1 /HPF